# Patient Record
Sex: FEMALE | Race: WHITE | NOT HISPANIC OR LATINO | Employment: PART TIME | URBAN - METROPOLITAN AREA
[De-identification: names, ages, dates, MRNs, and addresses within clinical notes are randomized per-mention and may not be internally consistent; named-entity substitution may affect disease eponyms.]

---

## 2017-10-13 ENCOUNTER — HOSPITAL ENCOUNTER (OUTPATIENT)
Dept: MAMMOGRAPHY | Facility: CLINIC | Age: 67
Discharge: HOME | End: 2017-10-13
Attending: FAMILY MEDICINE | Admitting: FAMILY MEDICINE
Payer: MEDICARE

## 2017-10-13 DIAGNOSIS — Z12.31 ENCOUNTER FOR SCREENING MAMMOGRAM FOR HIGH-RISK PATIENT: ICD-10-CM

## 2017-10-13 PROCEDURE — 77063 BREAST TOMOSYNTHESIS BI: CPT

## 2018-01-12 DIAGNOSIS — R06.02 SOB (SHORTNESS OF BREATH): Primary | ICD-10-CM

## 2018-01-12 DIAGNOSIS — E78.00 HIGH CHOLESTEROL: ICD-10-CM

## 2018-01-12 DIAGNOSIS — Z82.49 FAMILY HISTORY OF CORONARY ARTERY DISEASE: ICD-10-CM

## 2018-01-12 DIAGNOSIS — I10 HTN (HYPERTENSION): ICD-10-CM

## 2018-01-26 ENCOUNTER — HOSPITAL ENCOUNTER (OUTPATIENT)
Dept: CARDIOLOGY | Facility: CLINIC | Age: 68
Discharge: HOME | End: 2018-01-26
Attending: FAMILY MEDICINE | Admitting: FAMILY MEDICINE
Payer: MEDICARE

## 2018-01-26 DIAGNOSIS — Z82.49 FAMILY HISTORY OF CORONARY ARTERY DISEASE: ICD-10-CM

## 2018-01-26 DIAGNOSIS — I10 HTN (HYPERTENSION): ICD-10-CM

## 2018-01-26 DIAGNOSIS — R06.02 SOB (SHORTNESS OF BREATH): ICD-10-CM

## 2018-01-26 DIAGNOSIS — E78.00 HIGH CHOLESTEROL: ICD-10-CM

## 2018-01-26 PROCEDURE — 93325 DOPPLER ECHO COLOR FLOW MAPG: CPT | Mod: 26 | Performed by: INTERNAL MEDICINE

## 2018-01-26 PROCEDURE — 93350 STRESS TTE ONLY: CPT | Mod: 26 | Performed by: INTERNAL MEDICINE

## 2018-01-26 PROCEDURE — 93321 DOPPLER ECHO F-UP/LMTD STD: CPT | Mod: 26 | Performed by: INTERNAL MEDICINE

## 2018-01-26 PROCEDURE — 93018 CV STRESS TEST I&R ONLY: CPT | Performed by: INTERNAL MEDICINE

## 2018-01-26 PROCEDURE — 93016 CV STRESS TEST SUPVJ ONLY: CPT | Performed by: INTERNAL MEDICINE

## 2018-01-26 PROCEDURE — 25500064 ZZH RX 255 OP 636: Performed by: FAMILY MEDICINE

## 2018-01-26 PROCEDURE — 93017 CV STRESS TEST TRACING ONLY: CPT

## 2018-01-26 RX ADMIN — HUMAN ALBUMIN MICROSPHERES AND PERFLUTREN 6 ML: 10; .22 INJECTION, SOLUTION INTRAVENOUS at 10:30

## 2018-11-02 ENCOUNTER — HOSPITAL ENCOUNTER (OUTPATIENT)
Dept: MAMMOGRAPHY | Facility: CLINIC | Age: 68
Discharge: HOME | End: 2018-11-02
Attending: FAMILY MEDICINE | Admitting: FAMILY MEDICINE
Payer: MEDICARE

## 2018-11-02 DIAGNOSIS — Z12.31 VISIT FOR SCREENING MAMMOGRAM: ICD-10-CM

## 2018-11-02 PROCEDURE — 77063 BREAST TOMOSYNTHESIS BI: CPT

## 2020-01-17 ENCOUNTER — MEDICAL CORRESPONDENCE (OUTPATIENT)
Dept: HEALTH INFORMATION MANAGEMENT | Facility: CLINIC | Age: 70
End: 2020-01-17

## 2020-01-17 ENCOUNTER — HOSPITAL ENCOUNTER (INPATIENT)
Facility: CLINIC | Age: 70
Setting detail: SURGERY ADMIT
End: 2020-01-17
Attending: ORTHOPAEDIC SURGERY | Admitting: ORTHOPAEDIC SURGERY
Payer: COMMERCIAL

## 2020-01-17 DIAGNOSIS — Z00.00 ROUTINE GENERAL MEDICAL EXAMINATION AT A HEALTH CARE FACILITY: Primary | ICD-10-CM

## 2020-01-29 ENCOUNTER — HOSPITAL ENCOUNTER (OUTPATIENT)
Dept: LAB | Facility: CLINIC | Age: 70
Discharge: HOME | End: 2020-01-29
Attending: ORTHOPAEDIC SURGERY | Admitting: ORTHOPAEDIC SURGERY
Payer: COMMERCIAL

## 2020-01-29 DIAGNOSIS — Z00.00 ROUTINE GENERAL MEDICAL EXAMINATION AT A HEALTH CARE FACILITY: ICD-10-CM

## 2020-01-29 LAB — HGB BLD-MCNC: 12.9 G/DL (ref 11.7–15.7)

## 2020-01-29 PROCEDURE — 85018 HEMOGLOBIN: CPT | Performed by: ORTHOPAEDIC SURGERY

## 2020-01-29 PROCEDURE — 36415 COLL VENOUS BLD VENIPUNCTURE: CPT | Performed by: ORTHOPAEDIC SURGERY

## 2020-03-15 RX ORDER — ACETAMINOPHEN 500 MG
1000 TABLET ORAL ONCE
Status: CANCELLED | OUTPATIENT
Start: 2020-03-15 | End: 2020-03-15

## 2020-03-15 RX ORDER — CEFAZOLIN SODIUM 2 G/100ML
2 INJECTION, SOLUTION INTRAVENOUS
Status: CANCELLED | OUTPATIENT
Start: 2020-03-15

## 2020-03-15 RX ORDER — CELECOXIB 200 MG/1
400 CAPSULE ORAL ONCE
Status: CANCELLED | OUTPATIENT
Start: 2020-03-15 | End: 2020-03-15

## 2020-03-15 RX ORDER — CEFAZOLIN SODIUM 1 G/3ML
1 INJECTION, POWDER, FOR SOLUTION INTRAMUSCULAR; INTRAVENOUS SEE ADMIN INSTRUCTIONS
Status: CANCELLED | OUTPATIENT
Start: 2020-03-15

## 2020-03-15 RX ORDER — PREGABALIN 150 MG/1
150 CAPSULE ORAL ONCE
Status: CANCELLED | OUTPATIENT
Start: 2020-03-15 | End: 2020-03-15

## 2020-05-11 DIAGNOSIS — Z11.59 ENCOUNTER FOR SCREENING FOR OTHER VIRAL DISEASES: Primary | ICD-10-CM

## 2020-05-24 DIAGNOSIS — Z11.59 ENCOUNTER FOR SCREENING FOR OTHER VIRAL DISEASES: ICD-10-CM

## 2020-05-24 PROCEDURE — 99000 SPECIMEN HANDLING OFFICE-LAB: CPT | Performed by: ORTHOPAEDIC SURGERY

## 2020-05-24 PROCEDURE — 87635 SARS-COV-2 COVID-19 AMP PRB: CPT | Performed by: ORTHOPAEDIC SURGERY

## 2020-05-24 PROCEDURE — 99207 ZZC NO BILLABLE SERVICE THIS VISIT: CPT

## 2020-05-25 LAB
SARS-COV-2 RNA SPEC QL NAA+PROBE: NOT DETECTED
SPECIMEN SOURCE: NORMAL

## 2020-05-26 RX ORDER — ASPIRIN 81 MG/1
81 TABLET ORAL DAILY
Status: ON HOLD | COMMUNITY
End: 2020-05-28

## 2020-05-26 RX ORDER — SERTRALINE HYDROCHLORIDE 100 MG/1
100 TABLET, FILM COATED ORAL DAILY
COMMUNITY

## 2020-05-26 RX ORDER — HYDROCHLOROTHIAZIDE 25 MG/1
25 TABLET ORAL DAILY
COMMUNITY
End: 2021-07-20

## 2020-05-26 RX ORDER — LOSARTAN POTASSIUM 100 MG/1
100 TABLET ORAL DAILY
COMMUNITY
End: 2021-07-20

## 2020-05-26 RX ORDER — ROSUVASTATIN CALCIUM 10 MG/1
20 TABLET, COATED ORAL DAILY
COMMUNITY
End: 2024-05-09

## 2020-05-26 NOTE — PROGRESS NOTES
PTA medications updated by Medication Scribe day before surgery via phone call with patient      -LAST DOSES ENTERED BY NURSE-    Medication history sources: Patient, Surescripts and H&P  Medication history source reliability: Good  Adherence assessment: N/A Not Observed    Significant changes made to the medication list:  Patient taking meds differently than prescribed; See PTA entries for: Rosuvastatin (takes every other day due to muscle pains)      Additional medication history information:   None        Prior to Admission medications    Medication Sig Last Dose Taking? Auth Provider   aspirin 81 MG EC tablet Take 81 mg by mouth daily  at AM Yes Reported, Patient   Coenzyme Q10 (COQ-10 PO) Take 1 tablet by mouth every other day  Yes Reported, Patient   Glucosamine HCl (GLUCOSAMINE PO) Take 1 tablet by mouth daily as needed  at PRN Yes Reported, Patient   hydrochlorothiazide (HYDRODIURIL) 25 MG tablet Take 25 mg by mouth daily  at AM Yes Reported, Patient   losartan (COZAAR) 100 MG tablet Take 100 mg by mouth daily  at AM Yes Reported, Patient   rosuvastatin (CRESTOR) 10 MG tablet Take 10 mg by mouth every other day (Patient taking differently than prescribed : RX states take 10mg daily)  Yes Reported, Patient   sertraline (ZOLOFT) 100 MG tablet Take 50 mg by mouth daily (takes 0.5 x 100mg)  at AM Yes Reported, Patient

## 2020-05-27 ENCOUNTER — ANESTHESIA (OUTPATIENT)
Dept: SURGERY | Facility: CLINIC | Age: 70
DRG: 470 | End: 2020-05-27
Payer: COMMERCIAL

## 2020-05-27 ENCOUNTER — APPOINTMENT (OUTPATIENT)
Dept: GENERAL RADIOLOGY | Facility: CLINIC | Age: 70
DRG: 470 | End: 2020-05-27
Attending: ORTHOPAEDIC SURGERY
Payer: COMMERCIAL

## 2020-05-27 ENCOUNTER — HOSPITAL ENCOUNTER (INPATIENT)
Facility: CLINIC | Age: 70
LOS: 1 days | Discharge: HOME | DRG: 470 | End: 2020-05-28
Attending: ORTHOPAEDIC SURGERY | Admitting: ORTHOPAEDIC SURGERY
Payer: COMMERCIAL

## 2020-05-27 ENCOUNTER — ANESTHESIA EVENT (OUTPATIENT)
Dept: SURGERY | Facility: CLINIC | Age: 70
DRG: 470 | End: 2020-05-27
Payer: COMMERCIAL

## 2020-05-27 ENCOUNTER — APPOINTMENT (OUTPATIENT)
Dept: PHYSICAL THERAPY | Facility: CLINIC | Age: 70
DRG: 470 | End: 2020-05-27
Payer: COMMERCIAL

## 2020-05-27 DIAGNOSIS — Z96.641 STATUS POST RIGHT HIP REPLACEMENT: Primary | ICD-10-CM

## 2020-05-27 LAB
ABO + RH BLD: NORMAL
ABO + RH BLD: NORMAL
BLD GP AB SCN SERPL QL: NORMAL
BLOOD BANK CMNT PATIENT-IMP: NORMAL
CREAT SERPL-MCNC: 0.65 MG/DL (ref 0.52–1.04)
GFR SERPL CREATININE-BSD FRML MDRD: >90 ML/MIN/{1.73_M2}
HGB BLD-MCNC: 13.8 G/DL (ref 11.7–15.7)
POTASSIUM SERPL-SCNC: 3.7 MMOL/L (ref 3.4–5.3)
SPECIMEN EXP DATE BLD: NORMAL

## 2020-05-27 PROCEDURE — 99207 ZZC CONSULT E&M CHANGED TO INITIAL LEVEL: CPT | Performed by: NURSE PRACTITIONER

## 2020-05-27 PROCEDURE — 25000132 ZZH RX MED GY IP 250 OP 250 PS 637: Performed by: ORTHOPAEDIC SURGERY

## 2020-05-27 PROCEDURE — 25000125 ZZHC RX 250: Performed by: PHYSICIAN ASSISTANT

## 2020-05-27 PROCEDURE — 25000128 H RX IP 250 OP 636: Performed by: NURSE ANESTHETIST, CERTIFIED REGISTERED

## 2020-05-27 PROCEDURE — 86901 BLOOD TYPING SEROLOGIC RH(D): CPT | Performed by: ANESTHESIOLOGY

## 2020-05-27 PROCEDURE — 85018 HEMOGLOBIN: CPT | Performed by: PHYSICIAN ASSISTANT

## 2020-05-27 PROCEDURE — 40000170 ZZH STATISTIC PRE-PROCEDURE ASSESSMENT II: Performed by: ORTHOPAEDIC SURGERY

## 2020-05-27 PROCEDURE — 25000125 ZZHC RX 250: Performed by: NURSE ANESTHETIST, CERTIFIED REGISTERED

## 2020-05-27 PROCEDURE — 25800025 ZZH RX 258: Performed by: ORTHOPAEDIC SURGERY

## 2020-05-27 PROCEDURE — 36415 COLL VENOUS BLD VENIPUNCTURE: CPT | Performed by: PHYSICIAN ASSISTANT

## 2020-05-27 PROCEDURE — 25000128 H RX IP 250 OP 636: Performed by: ORTHOPAEDIC SURGERY

## 2020-05-27 PROCEDURE — 25000128 H RX IP 250 OP 636: Performed by: ANESTHESIOLOGY

## 2020-05-27 PROCEDURE — 71000013 ZZH RECOVERY PHASE 1 LEVEL 1 EA ADDTL HR: Performed by: ORTHOPAEDIC SURGERY

## 2020-05-27 PROCEDURE — 97161 PT EVAL LOW COMPLEX 20 MIN: CPT | Mod: GP | Performed by: PHYSICAL THERAPIST

## 2020-05-27 PROCEDURE — 84132 ASSAY OF SERUM POTASSIUM: CPT | Performed by: PHYSICIAN ASSISTANT

## 2020-05-27 PROCEDURE — 25800030 ZZH RX IP 258 OP 636: Performed by: ORTHOPAEDIC SURGERY

## 2020-05-27 PROCEDURE — 71000012 ZZH RECOVERY PHASE 1 LEVEL 1 FIRST HR: Performed by: ORTHOPAEDIC SURGERY

## 2020-05-27 PROCEDURE — 25000128 H RX IP 250 OP 636: Performed by: PHYSICIAN ASSISTANT

## 2020-05-27 PROCEDURE — 25800030 ZZH RX IP 258 OP 636: Performed by: NURSE ANESTHETIST, CERTIFIED REGISTERED

## 2020-05-27 PROCEDURE — 0SR904A REPLACEMENT OF RIGHT HIP JOINT WITH CERAMIC ON POLYETHYLENE SYNTHETIC SUBSTITUTE, UNCEMENTED, OPEN APPROACH: ICD-10-PCS | Performed by: ORTHOPAEDIC SURGERY

## 2020-05-27 PROCEDURE — 25000132 ZZH RX MED GY IP 250 OP 250 PS 637: Performed by: PHYSICIAN ASSISTANT

## 2020-05-27 PROCEDURE — 82565 ASSAY OF CREATININE: CPT | Performed by: PHYSICIAN ASSISTANT

## 2020-05-27 PROCEDURE — 36000065 ZZH SURGERY LEVEL 4 W FLUORO 1ST 30 MIN: Performed by: ORTHOPAEDIC SURGERY

## 2020-05-27 PROCEDURE — C1713 ANCHOR/SCREW BN/BN,TIS/BN: HCPCS | Performed by: ORTHOPAEDIC SURGERY

## 2020-05-27 PROCEDURE — 99221 1ST HOSP IP/OBS SF/LOW 40: CPT | Performed by: NURSE PRACTITIONER

## 2020-05-27 PROCEDURE — 25800030 ZZH RX IP 258 OP 636: Performed by: ANESTHESIOLOGY

## 2020-05-27 PROCEDURE — 40000985 XR PELVIS AD HIP PORTABLE RIGHT 1 VIEW

## 2020-05-27 PROCEDURE — 86850 RBC ANTIBODY SCREEN: CPT | Performed by: ANESTHESIOLOGY

## 2020-05-27 PROCEDURE — 25000566 ZZH SEVOFLURANE, EA 15 MIN: Performed by: ORTHOPAEDIC SURGERY

## 2020-05-27 PROCEDURE — 97116 GAIT TRAINING THERAPY: CPT | Mod: GP | Performed by: PHYSICAL THERAPIST

## 2020-05-27 PROCEDURE — 97110 THERAPEUTIC EXERCISES: CPT | Mod: GP | Performed by: PHYSICAL THERAPIST

## 2020-05-27 PROCEDURE — 97530 THERAPEUTIC ACTIVITIES: CPT | Mod: GP | Performed by: PHYSICAL THERAPIST

## 2020-05-27 PROCEDURE — 37000009 ZZH ANESTHESIA TECHNICAL FEE, EACH ADDTL 15 MIN: Performed by: ORTHOPAEDIC SURGERY

## 2020-05-27 PROCEDURE — 86900 BLOOD TYPING SEROLOGIC ABO: CPT | Performed by: ANESTHESIOLOGY

## 2020-05-27 PROCEDURE — 27210794 ZZH OR GENERAL SUPPLY STERILE: Performed by: ORTHOPAEDIC SURGERY

## 2020-05-27 PROCEDURE — 37000008 ZZH ANESTHESIA TECHNICAL FEE, 1ST 30 MIN: Performed by: ORTHOPAEDIC SURGERY

## 2020-05-27 PROCEDURE — C1776 JOINT DEVICE (IMPLANTABLE): HCPCS | Performed by: ORTHOPAEDIC SURGERY

## 2020-05-27 PROCEDURE — 12000000 ZZH R&B MED SURG/OB

## 2020-05-27 PROCEDURE — 36000063 ZZH SURGERY LEVEL 4 EA 15 ADDTL MIN: Performed by: ORTHOPAEDIC SURGERY

## 2020-05-27 PROCEDURE — 40000277 XR SURGERY CARM FLUORO LESS THAN 5 MIN W STILLS

## 2020-05-27 PROCEDURE — 25000125 ZZHC RX 250: Performed by: ORTHOPAEDIC SURGERY

## 2020-05-27 DEVICE — APEX HOLE ELIMINATOR - PS
Type: IMPLANTABLE DEVICE | Site: HIP | Status: FUNCTIONAL
Brand: APEX

## 2020-05-27 DEVICE — ACTIS DUOFIX HIP PROSTHESIS (FEMORAL STEM 12/14 TAPER CEMENTLESS SIZE 3 STD COLLAR)  CE
Type: IMPLANTABLE DEVICE | Site: HIP | Status: FUNCTIONAL
Brand: ACTIS

## 2020-05-27 DEVICE — PINNACLE CANCELLOUS BONE SCREW 6.5MM X 25MM
Type: IMPLANTABLE DEVICE | Site: HIP | Status: FUNCTIONAL
Brand: PINNACLE

## 2020-05-27 DEVICE — PINNACLE CANCELLOUS BONE SCREW 6.5MM X 35MM
Type: IMPLANTABLE DEVICE | Site: HIP | Status: FUNCTIONAL
Brand: PINNACLE

## 2020-05-27 DEVICE — PINNACLE GRIPTION ACETABULAR SHELL SECTOR 52MM OD
Type: IMPLANTABLE DEVICE | Site: HIP | Status: FUNCTIONAL
Brand: PINNACLE GRIPTION

## 2020-05-27 DEVICE — BIOLOX DELTA CERAMIC FEMORAL HEAD +8.5 36MM DIA 12/14 TAPER
Type: IMPLANTABLE DEVICE | Site: HIP | Status: FUNCTIONAL
Brand: BIOLOX DELTA

## 2020-05-27 DEVICE — PINNACLE HIP SOLUTIONS ALTRX POLYETHYLENE ACETABULAR LINER NEUTRAL 36MM ID 52MM OD
Type: IMPLANTABLE DEVICE | Site: HIP | Status: FUNCTIONAL
Brand: PINNACLE ALTRX

## 2020-05-27 RX ORDER — CEFAZOLIN SODIUM 1 G/3ML
1 INJECTION, POWDER, FOR SOLUTION INTRAMUSCULAR; INTRAVENOUS SEE ADMIN INSTRUCTIONS
Status: DISCONTINUED | OUTPATIENT
Start: 2020-05-27 | End: 2020-05-27 | Stop reason: HOSPADM

## 2020-05-27 RX ORDER — LIDOCAINE 40 MG/G
CREAM TOPICAL
Status: DISCONTINUED | OUTPATIENT
Start: 2020-05-27 | End: 2020-05-28 | Stop reason: HOSPADM

## 2020-05-27 RX ORDER — DEXTROSE MONOHYDRATE, SODIUM CHLORIDE, AND POTASSIUM CHLORIDE 50; 1.49; 4.5 G/1000ML; G/1000ML; G/1000ML
INJECTION, SOLUTION INTRAVENOUS CONTINUOUS
Status: DISCONTINUED | OUTPATIENT
Start: 2020-05-27 | End: 2020-05-28 | Stop reason: HOSPADM

## 2020-05-27 RX ORDER — ALBUTEROL SULFATE 0.83 MG/ML
2.5 SOLUTION RESPIRATORY (INHALATION) EVERY 4 HOURS PRN
Status: DISCONTINUED | OUTPATIENT
Start: 2020-05-27 | End: 2020-05-27 | Stop reason: HOSPADM

## 2020-05-27 RX ORDER — HYDROXYZINE HYDROCHLORIDE 25 MG/1
25 TABLET, FILM COATED ORAL EVERY 6 HOURS PRN
Status: DISCONTINUED | OUTPATIENT
Start: 2020-05-27 | End: 2020-05-28 | Stop reason: HOSPADM

## 2020-05-27 RX ORDER — CELECOXIB 200 MG/1
400 CAPSULE ORAL ONCE
Status: COMPLETED | OUTPATIENT
Start: 2020-05-27 | End: 2020-05-27

## 2020-05-27 RX ORDER — NALOXONE HYDROCHLORIDE 0.4 MG/ML
.1-.4 INJECTION, SOLUTION INTRAMUSCULAR; INTRAVENOUS; SUBCUTANEOUS
Status: CANCELLED | OUTPATIENT
Start: 2020-05-27 | End: 2020-05-28

## 2020-05-27 RX ORDER — ONDANSETRON 2 MG/ML
4 INJECTION INTRAMUSCULAR; INTRAVENOUS EVERY 6 HOURS PRN
Status: DISCONTINUED | OUTPATIENT
Start: 2020-05-27 | End: 2020-05-28 | Stop reason: HOSPADM

## 2020-05-27 RX ORDER — MAGNESIUM HYDROXIDE 1200 MG/15ML
LIQUID ORAL PRN
Status: DISCONTINUED | OUTPATIENT
Start: 2020-05-27 | End: 2020-05-27 | Stop reason: HOSPADM

## 2020-05-27 RX ORDER — ONDANSETRON 2 MG/ML
INJECTION INTRAMUSCULAR; INTRAVENOUS PRN
Status: DISCONTINUED | OUTPATIENT
Start: 2020-05-27 | End: 2020-05-27

## 2020-05-27 RX ORDER — CEFAZOLIN SODIUM 2 G/100ML
2 INJECTION, SOLUTION INTRAVENOUS
Status: COMPLETED | OUTPATIENT
Start: 2020-05-27 | End: 2020-05-27

## 2020-05-27 RX ORDER — ONDANSETRON 4 MG/1
4 TABLET, ORALLY DISINTEGRATING ORAL EVERY 30 MIN PRN
Status: DISCONTINUED | OUTPATIENT
Start: 2020-05-27 | End: 2020-05-27 | Stop reason: HOSPADM

## 2020-05-27 RX ORDER — PROPOFOL 10 MG/ML
INJECTION, EMULSION INTRAVENOUS CONTINUOUS PRN
Status: DISCONTINUED | OUTPATIENT
Start: 2020-05-27 | End: 2020-05-27

## 2020-05-27 RX ORDER — CEFAZOLIN SODIUM 2 G/100ML
2 INJECTION, SOLUTION INTRAVENOUS EVERY 8 HOURS
Status: COMPLETED | OUTPATIENT
Start: 2020-05-27 | End: 2020-05-28

## 2020-05-27 RX ORDER — LIDOCAINE HYDROCHLORIDE 20 MG/ML
INJECTION, SOLUTION INFILTRATION; PERINEURAL PRN
Status: DISCONTINUED | OUTPATIENT
Start: 2020-05-27 | End: 2020-05-27

## 2020-05-27 RX ORDER — FENTANYL CITRATE 50 UG/ML
25-50 INJECTION, SOLUTION INTRAMUSCULAR; INTRAVENOUS EVERY 5 MIN PRN
Status: DISCONTINUED | OUTPATIENT
Start: 2020-05-27 | End: 2020-05-27 | Stop reason: HOSPADM

## 2020-05-27 RX ORDER — IBUPROFEN 400 MG/1
400 TABLET, FILM COATED ORAL 2 TIMES DAILY PRN
Status: ON HOLD | COMMUNITY
End: 2020-05-28

## 2020-05-27 RX ORDER — HYDROMORPHONE HYDROCHLORIDE 1 MG/ML
0.2 INJECTION, SOLUTION INTRAMUSCULAR; INTRAVENOUS; SUBCUTANEOUS
Status: DISCONTINUED | OUTPATIENT
Start: 2020-05-27 | End: 2020-05-28 | Stop reason: HOSPADM

## 2020-05-27 RX ORDER — HYDROMORPHONE HYDROCHLORIDE 1 MG/ML
.3-.5 INJECTION, SOLUTION INTRAMUSCULAR; INTRAVENOUS; SUBCUTANEOUS EVERY 5 MIN PRN
Status: DISCONTINUED | OUTPATIENT
Start: 2020-05-27 | End: 2020-05-27 | Stop reason: HOSPADM

## 2020-05-27 RX ORDER — DEXAMETHASONE SODIUM PHOSPHATE 4 MG/ML
INJECTION, SOLUTION INTRA-ARTICULAR; INTRALESIONAL; INTRAMUSCULAR; INTRAVENOUS; SOFT TISSUE PRN
Status: DISCONTINUED | OUTPATIENT
Start: 2020-05-27 | End: 2020-05-27

## 2020-05-27 RX ORDER — ACETAMINOPHEN 500 MG
1000 TABLET ORAL ONCE
Status: COMPLETED | OUTPATIENT
Start: 2020-05-27 | End: 2020-05-27

## 2020-05-27 RX ORDER — TRANEXAMIC ACID 10 MG/ML
1 INJECTION, SOLUTION INTRAVENOUS ONCE
Status: COMPLETED | OUTPATIENT
Start: 2020-05-27 | End: 2020-05-27

## 2020-05-27 RX ORDER — ONDANSETRON 2 MG/ML
4 INJECTION INTRAMUSCULAR; INTRAVENOUS EVERY 30 MIN PRN
Status: DISCONTINUED | OUTPATIENT
Start: 2020-05-27 | End: 2020-05-27 | Stop reason: HOSPADM

## 2020-05-27 RX ORDER — NALOXONE HYDROCHLORIDE 0.4 MG/ML
.1-.4 INJECTION, SOLUTION INTRAMUSCULAR; INTRAVENOUS; SUBCUTANEOUS
Status: DISCONTINUED | OUTPATIENT
Start: 2020-05-27 | End: 2020-05-28 | Stop reason: HOSPADM

## 2020-05-27 RX ORDER — FENTANYL CITRATE 50 UG/ML
INJECTION, SOLUTION INTRAMUSCULAR; INTRAVENOUS PRN
Status: DISCONTINUED | OUTPATIENT
Start: 2020-05-27 | End: 2020-05-27

## 2020-05-27 RX ORDER — SODIUM CHLORIDE, SODIUM LACTATE, POTASSIUM CHLORIDE, CALCIUM CHLORIDE 600; 310; 30; 20 MG/100ML; MG/100ML; MG/100ML; MG/100ML
INJECTION, SOLUTION INTRAVENOUS CONTINUOUS
Status: DISCONTINUED | OUTPATIENT
Start: 2020-05-27 | End: 2020-05-27 | Stop reason: HOSPADM

## 2020-05-27 RX ORDER — PROPOFOL 10 MG/ML
INJECTION, EMULSION INTRAVENOUS PRN
Status: DISCONTINUED | OUTPATIENT
Start: 2020-05-27 | End: 2020-05-27

## 2020-05-27 RX ORDER — EPHEDRINE SULFATE 50 MG/ML
INJECTION, SOLUTION INTRAMUSCULAR; INTRAVENOUS; SUBCUTANEOUS PRN
Status: DISCONTINUED | OUTPATIENT
Start: 2020-05-27 | End: 2020-05-27

## 2020-05-27 RX ORDER — LOSARTAN POTASSIUM 100 MG/1
100 TABLET ORAL DAILY
Status: DISCONTINUED | OUTPATIENT
Start: 2020-05-28 | End: 2020-05-28 | Stop reason: HOSPADM

## 2020-05-27 RX ORDER — POLYETHYLENE GLYCOL 3350 17 G/17G
17 POWDER, FOR SOLUTION ORAL DAILY
Status: DISCONTINUED | OUTPATIENT
Start: 2020-05-27 | End: 2020-05-28 | Stop reason: HOSPADM

## 2020-05-27 RX ORDER — PREGABALIN 150 MG/1
150 CAPSULE ORAL ONCE
Status: COMPLETED | OUTPATIENT
Start: 2020-05-27 | End: 2020-05-27

## 2020-05-27 RX ORDER — KETOROLAC TROMETHAMINE 30 MG/ML
INJECTION, SOLUTION INTRAMUSCULAR; INTRAVENOUS PRN
Status: DISCONTINUED | OUTPATIENT
Start: 2020-05-27 | End: 2020-05-27 | Stop reason: HOSPADM

## 2020-05-27 RX ORDER — KETOROLAC TROMETHAMINE 15 MG/ML
15 INJECTION, SOLUTION INTRAMUSCULAR; INTRAVENOUS EVERY 6 HOURS
Status: DISCONTINUED | OUTPATIENT
Start: 2020-05-27 | End: 2020-05-28 | Stop reason: HOSPADM

## 2020-05-27 RX ORDER — VANCOMYCIN HYDROCHLORIDE 1 G/20ML
INJECTION, POWDER, LYOPHILIZED, FOR SOLUTION INTRAVENOUS PRN
Status: DISCONTINUED | OUTPATIENT
Start: 2020-05-27 | End: 2020-05-27 | Stop reason: HOSPADM

## 2020-05-27 RX ORDER — ROSUVASTATIN CALCIUM 10 MG/1
10 TABLET, COATED ORAL AT BEDTIME
Status: DISCONTINUED | OUTPATIENT
Start: 2020-05-27 | End: 2020-05-28 | Stop reason: HOSPADM

## 2020-05-27 RX ORDER — ACETAMINOPHEN 325 MG/1
325-650 TABLET ORAL 2 TIMES DAILY PRN
Status: ON HOLD | COMMUNITY
End: 2020-05-28

## 2020-05-27 RX ORDER — BISACODYL 10 MG
10 SUPPOSITORY, RECTAL RECTAL DAILY PRN
Status: DISCONTINUED | OUTPATIENT
Start: 2020-05-27 | End: 2020-05-28 | Stop reason: HOSPADM

## 2020-05-27 RX ORDER — ACETAMINOPHEN 325 MG/1
650 TABLET ORAL EVERY 4 HOURS PRN
Status: DISCONTINUED | OUTPATIENT
Start: 2020-05-30 | End: 2020-05-28 | Stop reason: HOSPADM

## 2020-05-27 RX ORDER — ACETAMINOPHEN 325 MG/1
975 TABLET ORAL EVERY 8 HOURS
Status: DISCONTINUED | OUTPATIENT
Start: 2020-05-27 | End: 2020-05-28 | Stop reason: HOSPADM

## 2020-05-27 RX ORDER — MEPERIDINE HYDROCHLORIDE 25 MG/ML
12.5 INJECTION INTRAMUSCULAR; INTRAVENOUS; SUBCUTANEOUS EVERY 5 MIN PRN
Status: DISCONTINUED | OUTPATIENT
Start: 2020-05-27 | End: 2020-05-27 | Stop reason: HOSPADM

## 2020-05-27 RX ORDER — OXYCODONE HYDROCHLORIDE 5 MG/1
5-10 TABLET ORAL
Status: DISCONTINUED | OUTPATIENT
Start: 2020-05-27 | End: 2020-05-28 | Stop reason: HOSPADM

## 2020-05-27 RX ORDER — ONDANSETRON 4 MG/1
4 TABLET, ORALLY DISINTEGRATING ORAL EVERY 6 HOURS PRN
Status: DISCONTINUED | OUTPATIENT
Start: 2020-05-27 | End: 2020-05-28 | Stop reason: HOSPADM

## 2020-05-27 RX ORDER — AMOXICILLIN 250 MG
2 CAPSULE ORAL 2 TIMES DAILY
Status: DISCONTINUED | OUTPATIENT
Start: 2020-05-27 | End: 2020-05-28 | Stop reason: HOSPADM

## 2020-05-27 RX ADMIN — TRANEXAMIC ACID 1 G: 10 INJECTION, SOLUTION INTRAVENOUS at 12:41

## 2020-05-27 RX ADMIN — Medication 5 MG: at 11:19

## 2020-05-27 RX ADMIN — CEFAZOLIN SODIUM 2 G: 2 INJECTION, SOLUTION INTRAVENOUS at 20:47

## 2020-05-27 RX ADMIN — FENTANYL CITRATE 50 MCG: 50 INJECTION, SOLUTION INTRAMUSCULAR; INTRAVENOUS at 11:46

## 2020-05-27 RX ADMIN — SODIUM CHLORIDE, POTASSIUM CHLORIDE, SODIUM LACTATE AND CALCIUM CHLORIDE: 600; 310; 30; 20 INJECTION, SOLUTION INTRAVENOUS at 09:21

## 2020-05-27 RX ADMIN — FENTANYL CITRATE 50 MCG: 50 INJECTION, SOLUTION INTRAMUSCULAR; INTRAVENOUS at 13:35

## 2020-05-27 RX ADMIN — SUGAMMADEX 200 MG: 100 INJECTION, SOLUTION INTRAVENOUS at 12:51

## 2020-05-27 RX ADMIN — POTASSIUM CHLORIDE, DEXTROSE MONOHYDRATE AND SODIUM CHLORIDE: 150; 5; 450 INJECTION, SOLUTION INTRAVENOUS at 17:28

## 2020-05-27 RX ADMIN — ROCURONIUM BROMIDE 50 MG: 10 INJECTION INTRAVENOUS at 10:58

## 2020-05-27 RX ADMIN — CEFAZOLIN SODIUM 2 G: 2 INJECTION, SOLUTION INTRAVENOUS at 11:05

## 2020-05-27 RX ADMIN — Medication 5 MG: at 11:35

## 2020-05-27 RX ADMIN — DEXAMETHASONE SODIUM PHOSPHATE 4 MG: 4 INJECTION, SOLUTION INTRA-ARTICULAR; INTRALESIONAL; INTRAMUSCULAR; INTRAVENOUS; SOFT TISSUE at 10:58

## 2020-05-27 RX ADMIN — Medication 5 MG: at 11:53

## 2020-05-27 RX ADMIN — KETOROLAC TROMETHAMINE 15 MG: 15 INJECTION, SOLUTION INTRAMUSCULAR; INTRAVENOUS at 21:05

## 2020-05-27 RX ADMIN — PROPOFOL 20 MG: 10 INJECTION, EMULSION INTRAVENOUS at 13:08

## 2020-05-27 RX ADMIN — HYDROMORPHONE HYDROCHLORIDE 0.5 MG: 1 INJECTION, SOLUTION INTRAMUSCULAR; INTRAVENOUS; SUBCUTANEOUS at 13:34

## 2020-05-27 RX ADMIN — PHENYLEPHRINE HYDROCHLORIDE 100 MCG: 10 INJECTION INTRAVENOUS at 12:32

## 2020-05-27 RX ADMIN — PROPOFOL 170 MG: 10 INJECTION, EMULSION INTRAVENOUS at 10:58

## 2020-05-27 RX ADMIN — PROPOFOL 30 MG: 10 INJECTION, EMULSION INTRAVENOUS at 11:07

## 2020-05-27 RX ADMIN — SODIUM CHLORIDE, POTASSIUM CHLORIDE, SODIUM LACTATE AND CALCIUM CHLORIDE: 600; 310; 30; 20 INJECTION, SOLUTION INTRAVENOUS at 13:06

## 2020-05-27 RX ADMIN — HYDROMORPHONE HYDROCHLORIDE 0.5 MG: 1 INJECTION, SOLUTION INTRAMUSCULAR; INTRAVENOUS; SUBCUTANEOUS at 14:15

## 2020-05-27 RX ADMIN — PREGABALIN 150 MG: 150 CAPSULE ORAL at 09:50

## 2020-05-27 RX ADMIN — ROCURONIUM BROMIDE 20 MG: 10 INJECTION INTRAVENOUS at 11:46

## 2020-05-27 RX ADMIN — SODIUM CHLORIDE, POTASSIUM CHLORIDE, SODIUM LACTATE AND CALCIUM CHLORIDE: 600; 310; 30; 20 INJECTION, SOLUTION INTRAVENOUS at 11:48

## 2020-05-27 RX ADMIN — FENTANYL CITRATE 50 MCG: 50 INJECTION, SOLUTION INTRAMUSCULAR; INTRAVENOUS at 13:46

## 2020-05-27 RX ADMIN — CELECOXIB 400 MG: 200 CAPSULE ORAL at 09:50

## 2020-05-27 RX ADMIN — ONDANSETRON 4 MG: 2 INJECTION INTRAMUSCULAR; INTRAVENOUS at 12:00

## 2020-05-27 RX ADMIN — FENTANYL CITRATE 50 MCG: 50 INJECTION, SOLUTION INTRAMUSCULAR; INTRAVENOUS at 11:07

## 2020-05-27 RX ADMIN — OXYCODONE HYDROCHLORIDE 5 MG: 5 TABLET ORAL at 23:09

## 2020-05-27 RX ADMIN — FENTANYL CITRATE 50 MCG: 50 INJECTION, SOLUTION INTRAMUSCULAR; INTRAVENOUS at 10:58

## 2020-05-27 RX ADMIN — Medication 10 MG: at 11:13

## 2020-05-27 RX ADMIN — ACETAMINOPHEN 975 MG: 325 TABLET, FILM COATED ORAL at 17:29

## 2020-05-27 RX ADMIN — LIDOCAINE HYDROCHLORIDE 100 MG: 20 INJECTION, SOLUTION INFILTRATION; PERINEURAL at 10:58

## 2020-05-27 RX ADMIN — PROPOFOL 20 MCG/KG/MIN: 10 INJECTION, EMULSION INTRAVENOUS at 11:34

## 2020-05-27 RX ADMIN — CEFAZOLIN SODIUM 1 G: 2 INJECTION, SOLUTION INTRAVENOUS at 13:05

## 2020-05-27 RX ADMIN — Medication 5 MG: at 11:26

## 2020-05-27 RX ADMIN — ACETAMINOPHEN 1000 MG: 500 TABLET, FILM COATED ORAL at 09:53

## 2020-05-27 RX ADMIN — DOCUSATE SODIUM 50 MG AND SENNOSIDES 8.6 MG 2 TABLET: 8.6; 5 TABLET, FILM COATED ORAL at 20:45

## 2020-05-27 RX ADMIN — TRANEXAMIC ACID 1 G: 10 INJECTION, SOLUTION INTRAVENOUS at 11:10

## 2020-05-27 RX ADMIN — Medication 5 MG: at 12:11

## 2020-05-27 RX ADMIN — HYDROMORPHONE HYDROCHLORIDE 0.5 MG: 1 INJECTION, SOLUTION INTRAMUSCULAR; INTRAVENOUS; SUBCUTANEOUS at 13:03

## 2020-05-27 RX ADMIN — HYDROXYZINE HYDROCHLORIDE 25 MG: 25 TABLET ORAL at 21:04

## 2020-05-27 RX ADMIN — FENTANYL CITRATE 50 MCG: 50 INJECTION, SOLUTION INTRAMUSCULAR; INTRAVENOUS at 11:41

## 2020-05-27 ASSESSMENT — ACTIVITIES OF DAILY LIVING (ADL)
ADLS_ACUITY_SCORE: 13
ADLS_ACUITY_SCORE: 14

## 2020-05-27 ASSESSMENT — MIFFLIN-ST. JEOR: SCORE: 1320.81

## 2020-05-27 NOTE — PLAN OF CARE
A&OX4.  Up with 1 and walker with PT.  IV fluids infusing.  Patient is due to void, straight cath'd in PACU at 1430 for 250mL.  Patient states her pain is tolerable, scheduled tylenol given and ice pack utilized.

## 2020-05-27 NOTE — ANESTHESIA CARE TRANSFER NOTE
Patient: Inés Griggs    Procedure(s):  RIGHT TOTAL HIP ARTHROPLASTY DIRECT ANTERIOR APPROACH    Diagnosis: Primary osteoarthritis of right hip [M16.11]  Diagnosis Additional Information: No value filed.    Anesthesia Type:   General     Note:  Airway :Face Mask  Patient transferred to:PACU  Comments: To PACU: Arouses easily, good airway, 02 face mask, VSS  Report to RNHandoff Report: Identifed the Patient, Identified the Reponsible Provider, Reviewed the pertinent medical history, Discussed the surgical course, Reviewed Intra-OP anesthesia mangement and issues during anesthesia, Set expectations for post-procedure period and Allowed opportunity for questions and acknowledgement of understanding      Vitals: (Last set prior to Anesthesia Care Transfer)    CRNA VITALS  5/27/2020 1251 - 5/27/2020 1330      5/27/2020             Pulse:  91    SpO2:  100 %                Electronically Signed By: PORTILLO Kerr CRNA  May 27, 2020  1:30 PM

## 2020-05-27 NOTE — PROGRESS NOTES
05/27/20 1602   Quick Adds   Type of Visit Initial PT Evaluation   Living Environment   Lives With alone   Living Arrangements house   Home Accessibility stairs to enter home;stairs within home   Number of Stairs, Main Entrance 2   Stair Railings, Main Entrance none   Number of Stairs, Within Home, Primary   (13)   Stair Railings, Within Home, Primary railing on left side (ascending)   Transportation Anticipated car, drives self   Living Environment Comment Pt's daughter will be staying with pt at time of discharge.    Self-Care   Usual Activity Tolerance good   Current Activity Tolerance moderate   Regular Exercise No   Equipment Currently Used at Home none   Activity/Exercise/Self-Care Comment Pt will have access to a 4WW. Pt has walking poles.   Functional Level Prior   Ambulation 0-->independent   Transferring 0-->independent   Fall history within last six months no   General Information   Onset of Illness/Injury or Date of Surgery - Date 05/27/20   Referring Physician Usama Christianson MD   Patient/Family Goals Statement Return home with assist of daughter.    Pertinent History of Current Problem (include personal factors and/or comorbidities that impact the POC) 71 y/o female POD # 0 R BOBBY.    Precautions/Limitations fall precautions   Weight-Bearing Status - RLE weight-bearing as tolerated   General Observations Pt in supine upon arrival of therapist.    General Info Comments Ambulate with assist.   Cognitive Status Examination   Orientation orientation to person, place and time   Level of Consciousness alert   Follows Commands and Answers Questions 100% of the time   Personal Safety and Judgment intact   Pain Assessment   Patient Currently in Pain   (R hip pain at rest: 2-3/10)   Integumentary/Edema   Integumentary/Edema Comments R hip incision covered with dressing.    Posture    Posture Comments Noted forward head and shoulder posture upon sitting EOB and standing at FWW.    Range of Motion (ROM)   ROM  "Comment Limited R hip ROM secondary to pain, otherwise B LEs WFL.    Strength   Strength Comments Not formally assessed. Pt demonstrates at least 3/5 grossly in B LEs.   Bed Mobility   Bed Mobility Comments Supine-sit with SBA.    Transfer Skills   Transfer Comments Sit <> stand wtih FWW and Fred.    Gait   Gait Comments Pt amb 10' with FWW and Fred.    Balance   Balance Comments Noted good seated balance and fair standing balance at FWW.   Sensory Examination   Sensory Perception Comments Pt denies numbness/tingling in B LEs.    Modality Interventions   Planned Modality Interventions Cryotherapy   Planned Modality Interventions Comments PRN.   General Therapy Interventions   Planned Therapy Interventions bed mobility training;gait training;ROM;strengthening;transfer training   Clinical Impression   Criteria for Skilled Therapeutic Intervention yes, treatment indicated   PT Diagnosis Difficulty with gait.    Influenced by the following impairments Pain, Impaired R hip ROM, Decreased strength, Decreased activity tolerance   Functional limitations due to impairments Limited functional mobility requiring AD and assist.    Clinical Presentation Stable/Uncomplicated   Clinical Presentation Rationale Based on PMH, current presentation, and social support.    Clinical Decision Making (Complexity) Low complexity   Therapy Frequency 2x/day   Predicted Duration of Therapy Intervention (days/wks) 2 days   Anticipated Equipment Needs at Discharge walker   Anticipated Discharge Disposition Other (see comments)  (Defer to Ortho MD/PA)   Risk & Benefits of therapy have been explained Yes   Patient, Family & other staff in agreement with plan of care Yes   [facility] [employer]-PAC TM \"6 Clicks\"   2016, Trustees of [facility], under license to [employer].  All rights reserved.   6 Clicks Short Forms Basic Mobility Inpatient Short Form   [facility] AM-PAC  \"6 Clicks\" V.2 Basic Mobility Inpatient Short Form   1. " Turning from your back to your side while in a flat bed without using bedrails? 3 - A Little   2. Moving from lying on your back to sitting on the side of a flat bed without using bedrails? 3 - A Little   3. Moving to and from a bed to a chair (including a wheelchair)? 3 - A Little   4. Standing up from a chair using your arms (e.g., wheelchair, or bedside chair)? 3 - A Little   5. To walk in hospital room? 3 - A Little   6. Climbing 3-5 steps with a railing? 2 - A Lot   Basic Mobility Raw Score (Score out of 24.Lower scores equate to lower levels of function) 17   Total Evaluation Time   Total Evaluation Time (Minutes) 5

## 2020-05-27 NOTE — CONSULTS
"Consult Date:  2020      REQUESTING PHYSICIAN:  Usama Mckinley MD      REASON FOR CONSULTATION:  \"Hospitalist consult.\"      HISTORY OF PRESENT ILLNESS:  Ms. Griggs is a 70-year-old woman with PMH of hypertension, hyperlipidemia, anxiety, prediabetes, who on 2020 with Dr. Mckinley underwent a right total hip arthroplasty, direct anterior approach.  Duration of procedure was 2 hours and 15 minutes.  EBL was 300 mL and intake was 2.2 liters of LR.  Other medications received included Decadron, Ancef, tranexamic acid.  She was extubated at the end of her procedure and admitted to station 55 for further evaluation and management.  Hospitalist Service was asked to see her in consultation to assist with management of her medical comorbid conditions.      The patient receives primary care through [facility].  She denies any acute changes in her health since her preoperative visit.  She had a COVID test on 2020 which was not detected.  She periodically checks blood pressures at home, systolics run anywhere from 120s to 160s.  Her last dose of HCTZ was on the am of 2020.  Her last dose of losartan was on the a.m. of 2020.  During my visit, her blood pressure was 116/64.      PAST MEDICAL HISTORY:   1.  Hypertension.   2.  Anxiety.   3.  Hyperlipidemia.   4.  Prediabetes.      PAST SURGICAL HISTORY:   1.  .   2.  Parotidectomy.      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.  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  She is single, , remote former smoker, quitting 40 years ago.  Nondrug user.      FAMILY HISTORY:  Two adult children who are alive and well.  Father  at age 76 possibly of MI.  His first MI was at age 68.  Mother with thoracic aortic aneurysm.      OUTPATIENT MEDICATIONS:    Medications Prior to Admission   Medication Sig Dispense Refill Last Dose     acetaminophen (TYLENOL) 325 " MG tablet Take 325-650 mg by mouth 2 times daily as needed for mild pain   5/26/2020 at 0800     aspirin 81 MG EC tablet Take 81 mg by mouth daily   Past Week at AM     Coenzyme Q10 (COQ-10 PO) Take 1 tablet by mouth every other day   5/24/2020 at 0800     Glucosamine HCl (GLUCOSAMINE PO) Take 1 tablet by mouth daily as needed   Past Week at PRN     hydrochlorothiazide (HYDRODIURIL) 25 MG tablet Take 25 mg by mouth daily   5/26/2020 at 0800     ibuprofen (ADVIL/MOTRIN) 400 MG tablet Take 400 mg by mouth 2 times daily as needed for moderate pain   5/20/2020 at 0800     losartan (COZAAR) 100 MG tablet Take 100 mg by mouth daily   5/27/2020 at 0630     rosuvastatin (CRESTOR) 10 MG tablet Take 10 mg by mouth every other day (Patient taking differently than prescribed : RX states take 10mg daily)   5/25/2020 at 0800     sertraline (ZOLOFT) 100 MG tablet Take 50 mg by mouth daily (takes 0.5 x 100mg)   5/26/2020 at 0800        REVIEW OF SYSTEMS:  Ten-point review of systems negative aside from what is described in HPI.      PHYSICAL EXAMINATION:   GENERAL:  Middle-aged woman, appears stated age without acute distress.   Neuro: +follows commands wiggle toes and show 2 fingers bilat, face symmetric, speech fluent  HEENT: NC/AT, pupils equal round 3mm briskly reactive bilat, sclera nonicteric, noninjected, conjunctiva pale, mouth moist oral mucosa  Neck: supple  Heart: S1S2, no murmurs  Lungs: CTAB upper and lower lobes  Abd:normoactive bowel sounds, soft, nontender, nondisteded   EXTREMITIES:  Right hip with large dressing clean, dry and intact, no shadowing, soft surrounding tissue.  No hematoma appreciated. no edema, preserved sensation and motor function, brisk CR, easily palpable DP and PT pulses      IMPRESSION:  Ms. Griggs is a 70-year-old woman with past medical history of hypertension, hyperlipidemia, anxiety, prediabetes, who underwent a right total hip arthroplasty, direct anterior approach on 05/27/2020.   Hospitalist Service has been asked to see her in consultation to assist with management of medical comorbid conditions.      PROBLEM LIST AND PLAN:   Hypertension.   -- Resume losartan on the a.m. of 2020.   -- Can consider resuming HCTZ on the a.m. of 2020 pending her blood pressure readings and laboratory data.     Hyperlipidemia on low dose every other day to 2x/week dosing statin 2/2 myalgias, last dose was 20.   -- Resume Crestor on the a.m. of 2020.     Anxiety, well controlled on Zoloft.   -- Notably, there is an interaction between her Zoloft and scheduled ketorolac that is ordered.   -- After discussion with the patient,  she is okay,  holding her Zoloft while she is concurrently on Toradol.     Renal.  Normal renal function with baseline creatinine of 0.65.  Currently on D5 half NS with 20 of KCl.  Also on ARB and diuretic prior to admission.   -- We will check a BMP in the a.m. of 2020.     Status post right total hip arthroplasty on 2020, direct anterior approach with Dr. Mckinley.   -- Defer analgesia, mobility, therapy, antimicrobials, pharmacologic DVT prophylaxis to primary surgical service.     GI.   -- Agree with scheduled bowel regimen.     Prophylaxis.   -- Aspirin 325 mg daily x 6 weeks, to start on 2020.     DISPOSITION:  anticipate discharge on postop day one pending how she does with therapies.     CODE STATUS:  Nothing is ordered.  I discussed this with the patient, she desires full resuscitative measures.        Total time is 25 minutes, of which 15 minutes was face-to-face time, remainder spent in counseling and coordination of care.      The patient will be independently evaluated by Dr. Lanre Wheatley.         As dictated by INA IRIZARRY, PORTILLO, CNP            D: 2020   T: 2020   MT: LORI      Name:     NARESH LIM   MRN:      [id number]        Account:       [id number]   :      1950           Consult Date:  2020       Document: [id number]       cc: Usama Christianson MD

## 2020-05-27 NOTE — ANESTHESIA POSTPROCEDURE EVALUATION
Patient: Inés Griggs    Procedure(s):  RIGHT TOTAL HIP ARTHROPLASTY DIRECT ANTERIOR APPROACH    Diagnosis:Primary osteoarthritis of right hip [M16.11]  Diagnosis Additional Information: No value filed.    Anesthesia Type:  General    Note:  Anesthesia Post Evaluation    Patient location during evaluation: PACU  Patient participation: Able to fully participate in evaluation  Level of consciousness: awake and alert  Pain management: adequate  Airway patency: patent  Cardiovascular status: acceptable and hemodynamically stable  Respiratory status: acceptable and nasal cannula  Hydration status: euvolemic  PONV: none     Anesthetic complications: None          Last vitals:  Vitals:    05/27/20 1530 05/27/20 1600 05/27/20 1630   BP: 109/58 116/61 130/68   Pulse: 90 75 78   Resp: 9 15 16   Temp:      SpO2: 95% 95% 95%         Electronically Signed By: Antonio Arnett MD  May 27, 2020  4:48 PM

## 2020-05-27 NOTE — BRIEF OP NOTE
[facility]    Brief Operative Note    Pre-operative diagnosis: Right hip OA  Post-operative diagnosis same  Procedure: RIGHT DIRECT ANTERIOR TOTAL HIP ARTHROPLASTY  Surgeon(s) and Role:     * Usama Christianson MD - Primary     * Efren Castillo PA-C - Assisting  Anesthesia: General   Estimated blood loss: 300 ml  Drains:  None  Specimens: None  Findings:  Advanced OA  Complications: None    Plan: DC home POD1 w/family assist.  DVT prophylaxis w/ASA 325mg QD x6wks.      Implants:   Implant Name Type Inv. Item Serial No.  Lot No. LRB No. Used Action   IMP APEX HOLE ELIMINATOR HIP DEPUY DURALOC 1246- Metallic Hardware/[place] IMP APEX HOLE ELIMINATOR HIP DEPUY DURALOC 1246-  [employer] [id number] Right 1 Implanted   IMP SHELL ACET DEPUY PINNACLE GRIPTION 52MM [id number] Total Joint Component/Insert IMP SHELL ACET DEPUY PINNACLE GRIPTION 52MM [id number]  [id number] Right 1 Implanted   IMP SCR BONE CAN ACE 6.5X35MM 1217- Metallic Hardware/[place] IMP SCR BONE CAN ACE 6.5X35MM 1217-  [employer] [id number] Right 1 Implanted   IMP SCR BONE CAN ACE 6.5X25MM 1217- Metallic Hardware/[place] IMP SCR BONE CAN ACE 6.5X25MM 1217-  [employer] [id number] Right 1 Implanted   IMP INSERT HIP DEPUY PINNACLE ALTRX [id number] 1221- Total Joint Component/Insert IMP INSERT HIP DEPUY PINNACLE ALTRX [id number] 1221-  [employer]-   [id number] Right 1 Implanted   ACTIS SIZE 3 FEMORAL STEM    Depuy [id number] Right 1 Implanted   IMP HEAD FEMORAL DEPUY CERAMIC 36MM +8MM 1365- Total Joint Component/Insert IMP HEAD FEMORAL DEPUY CERAMIC 36MM +8MM 1365-  [id number] Right 1 Implanted

## 2020-05-27 NOTE — ANESTHESIA PREPROCEDURE EVALUATION
"Anesthesia Pre-Procedure Evaluation    Patient: Inés Griggs   MRN: [id number] : 1950          Preoperative Diagnosis: Primary osteoarthritis of right hip [M16.11]    Procedure(s):  RIGHT TOTAL HIP ARTHROPLASTY DIRECT ANTERIOR APPROACH    Past Medical History:   Diagnosis Date     Arthritis      Hypertension      History reviewed. No pertinent surgical history.    Anesthesia Evaluation     .             ROS/MED HX    ENT/Pulmonary:  - neg pulmonary ROS    (-) sleep apnea   Neurologic:  - neg neurologic ROS     Cardiovascular:     (+) hypertension----. : . . . :. .       METS/Exercise Tolerance:     Hematologic:         Musculoskeletal:   (+) arthritis,  -       GI/Hepatic:  - neg GI/hepatic ROS      (-) GERD   Renal/Genitourinary:  - ROS Renal section negative       Endo:  - neg endo ROS       Psychiatric:     (+) psychiatric history anxiety      Infectious Disease:         Malignancy:         Other:                          Physical Exam  Normal systems: dental    Airway   Mallampati: II  TM distance: >3 FB  Neck ROM: full    Dental     Cardiovascular   Rhythm and rate: regular      Pulmonary    breath sounds clear to auscultation            Lab Results   Component Value Date    HGB 13.8 2020    POTASSIUM 3.7 2020    CR 0.65 2020       Preop Vitals  BP Readings from Last 3 Encounters:   20 (!) 163/72    Pulse Readings from Last 3 Encounters:   No data found for Pulse      Resp Readings from Last 3 Encounters:   20 16    SpO2 Readings from Last 3 Encounters:   20 98%      Temp Readings from Last 1 Encounters:   20 36.4  C (97.5  F) (Temporal)    Ht Readings from Last 1 Encounters:   20 1.638 m (5' 4.5\")      Wt Readings from Last 1 Encounters:   20 80.3 kg (177 lb)    Estimated body mass index is 29.91 kg/m  as calculated from the following:    Height as of this encounter: 1.638 m (5' 4.5\").    Weight as of this encounter: 80.3 kg (177 lb). "       Anesthesia Plan      History & Physical Review  History and physical reviewed and following examination; no interval change.    ASA Status:  2 .    NPO Status:  > 8 hours    Plan for General (ETT) with Intravenous and Propofol induction. Maintenance will be Balanced.    PONV prophylaxis:  Ondansetron (or other 5HT-3) and Dexamethasone or Solumedrol         Postoperative Care  Postoperative pain management:  Multi-modal analgesia.      Consents  Anesthetic plan, risks, benefits and alternatives discussed with:  Patient..                 Raffaele Morrow MD

## 2020-05-27 NOTE — PLAN OF CARE
Patient plan: Return home. Pt reports her daughter plans to stay with her for 2 days.  Current status: Evaluation completed and treatment initiated. Patient is POD # 0 R BOBBY. Pt completed bed mobility with SBA. Transfers and ambulation of 200 feet with FWW and Fred d/t occasional unsteadiness.   Anticipated status at discharge: CGA for transfers and gait with use of walker, Fred to navigate stairs

## 2020-05-28 ENCOUNTER — APPOINTMENT (OUTPATIENT)
Dept: OCCUPATIONAL THERAPY | Facility: CLINIC | Age: 70
DRG: 470 | End: 2020-05-28
Attending: ORTHOPAEDIC SURGERY
Payer: COMMERCIAL

## 2020-05-28 ENCOUNTER — DOCUMENTATION ONLY (OUTPATIENT)
Dept: OTHER | Facility: CLINIC | Age: 70
End: 2020-05-28

## 2020-05-28 ENCOUNTER — APPOINTMENT (OUTPATIENT)
Dept: PHYSICAL THERAPY | Facility: CLINIC | Age: 70
DRG: 470 | End: 2020-05-28
Attending: ORTHOPAEDIC SURGERY
Payer: COMMERCIAL

## 2020-05-28 VITALS
BODY MASS INDEX: 29.49 KG/M2 | HEIGHT: 65 IN | TEMPERATURE: 98.1 F | HEART RATE: 71 BPM | OXYGEN SATURATION: 92 % | RESPIRATION RATE: 16 BRPM | WEIGHT: 177 LBS | DIASTOLIC BLOOD PRESSURE: 59 MMHG | SYSTOLIC BLOOD PRESSURE: 120 MMHG

## 2020-05-28 LAB
ANION GAP SERPL CALCULATED.3IONS-SCNC: 6 MMOL/L (ref 3–14)
BUN SERPL-MCNC: 15 MG/DL (ref 7–30)
CALCIUM SERPL-MCNC: 8.3 MG/DL (ref 8.5–10.1)
CHLORIDE SERPL-SCNC: 111 MMOL/L (ref 94–109)
CO2 SERPL-SCNC: 23 MMOL/L (ref 20–32)
CREAT SERPL-MCNC: 0.64 MG/DL (ref 0.52–1.04)
GFR SERPL CREATININE-BSD FRML MDRD: >90 ML/MIN/{1.73_M2}
GLUCOSE SERPL-MCNC: 112 MG/DL (ref 70–99)
HGB BLD-MCNC: 11.2 G/DL (ref 11.7–15.7)
POTASSIUM SERPL-SCNC: 3.5 MMOL/L (ref 3.4–5.3)
SODIUM SERPL-SCNC: 140 MMOL/L (ref 133–144)

## 2020-05-28 PROCEDURE — 99232 SBSQ HOSP IP/OBS MODERATE 35: CPT | Performed by: INTERNAL MEDICINE

## 2020-05-28 PROCEDURE — 85018 HEMOGLOBIN: CPT | Performed by: ORTHOPAEDIC SURGERY

## 2020-05-28 PROCEDURE — 25000132 ZZH RX MED GY IP 250 OP 250 PS 637: Performed by: ORTHOPAEDIC SURGERY

## 2020-05-28 PROCEDURE — 97535 SELF CARE MNGMENT TRAINING: CPT | Mod: GO | Performed by: OCCUPATIONAL THERAPIST

## 2020-05-28 PROCEDURE — 25000128 H RX IP 250 OP 636: Performed by: ORTHOPAEDIC SURGERY

## 2020-05-28 PROCEDURE — 97530 THERAPEUTIC ACTIVITIES: CPT | Mod: GP | Performed by: PHYSICAL THERAPIST

## 2020-05-28 PROCEDURE — 97116 GAIT TRAINING THERAPY: CPT | Mod: GP | Performed by: PHYSICAL THERAPIST

## 2020-05-28 PROCEDURE — 80048 BASIC METABOLIC PNL TOTAL CA: CPT | Performed by: ORTHOPAEDIC SURGERY

## 2020-05-28 PROCEDURE — 36415 COLL VENOUS BLD VENIPUNCTURE: CPT | Performed by: ORTHOPAEDIC SURGERY

## 2020-05-28 PROCEDURE — 25000132 ZZH RX MED GY IP 250 OP 250 PS 637: Performed by: NURSE PRACTITIONER

## 2020-05-28 PROCEDURE — 97110 THERAPEUTIC EXERCISES: CPT | Mod: GP | Performed by: PHYSICAL THERAPIST

## 2020-05-28 PROCEDURE — 97165 OT EVAL LOW COMPLEX 30 MIN: CPT | Mod: GO | Performed by: OCCUPATIONAL THERAPIST

## 2020-05-28 RX ORDER — CELECOXIB 200 MG/1
200 CAPSULE ORAL DAILY
Qty: 30 CAPSULE | Refills: 0 | Status: SHIPPED | OUTPATIENT
Start: 2020-05-28 | End: 2021-07-20

## 2020-05-28 RX ORDER — SENNOSIDES A AND B 8.6 MG/1
1-2 TABLET, FILM COATED ORAL DAILY
Qty: 14 TABLET | Refills: 0 | Status: SHIPPED | OUTPATIENT
Start: 2020-05-28 | End: 2021-07-20

## 2020-05-28 RX ORDER — ASPIRIN 325 MG
325 TABLET ORAL DAILY
Qty: 42 TABLET | Refills: 0 | Status: SHIPPED | OUTPATIENT
Start: 2020-05-28 | End: 2020-07-09

## 2020-05-28 RX ORDER — OXYCODONE HYDROCHLORIDE 5 MG/1
5-10 TABLET ORAL EVERY 6 HOURS PRN
Qty: 30 TABLET | Refills: 0 | Status: SHIPPED | OUTPATIENT
Start: 2020-05-28 | End: 2020-05-31

## 2020-05-28 RX ORDER — ACETAMINOPHEN 500 MG
500-1000 TABLET ORAL EVERY 6 HOURS PRN
Qty: 60 TABLET | Refills: 0 | Status: ON HOLD | OUTPATIENT
Start: 2020-05-28 | End: 2021-07-22

## 2020-05-28 RX ADMIN — LOSARTAN POTASSIUM 100 MG: 100 TABLET, FILM COATED ORAL at 09:02

## 2020-05-28 RX ADMIN — ASPIRIN 325 MG: 325 TABLET, COATED ORAL at 09:02

## 2020-05-28 RX ADMIN — ACETAMINOPHEN 975 MG: 325 TABLET, FILM COATED ORAL at 09:01

## 2020-05-28 RX ADMIN — DOCUSATE SODIUM 50 MG AND SENNOSIDES 8.6 MG 2 TABLET: 8.6; 5 TABLET, FILM COATED ORAL at 09:01

## 2020-05-28 RX ADMIN — OXYCODONE HYDROCHLORIDE 5 MG: 5 TABLET ORAL at 12:29

## 2020-05-28 RX ADMIN — OXYCODONE HYDROCHLORIDE 5 MG: 5 TABLET ORAL at 06:50

## 2020-05-28 RX ADMIN — KETOROLAC TROMETHAMINE 15 MG: 15 INJECTION, SOLUTION INTRAMUSCULAR; INTRAVENOUS at 03:12

## 2020-05-28 RX ADMIN — KETOROLAC TROMETHAMINE 15 MG: 15 INJECTION, SOLUTION INTRAMUSCULAR; INTRAVENOUS at 09:05

## 2020-05-28 RX ADMIN — CEFAZOLIN SODIUM 2 G: 2 INJECTION, SOLUTION INTRAVENOUS at 05:55

## 2020-05-28 RX ADMIN — ACETAMINOPHEN 975 MG: 325 TABLET, FILM COATED ORAL at 03:11

## 2020-05-28 RX ADMIN — HYDROXYZINE HYDROCHLORIDE 25 MG: 25 TABLET ORAL at 03:21

## 2020-05-28 ASSESSMENT — ACTIVITIES OF DAILY LIVING (ADL)
ADLS_ACUITY_SCORE: 11
PREVIOUS_RESPONSIBILITIES: MEAL PREP;LAUNDRY;HOUSEKEEPING;SHOPPING;MEDICATION MANAGEMENT;FINANCES;DRIVING
ADLS_ACUITY_SCORE: 11

## 2020-05-28 NOTE — PROGRESS NOTES
"Orthopedic Surgery  5/28/2020  POD #1    S: Patient voices no complaints today. Pain controlled. Ambulating.     O: Blood pressure 131/74, pulse 74, temperature 98.1  F (36.7  C), temperature source Oral, resp. rate 17, height 1.638 m (5' 4.5\"), weight 80.3 kg (177 lb), SpO2 97 %.  Lab Results   Component Value Date    HGB 11.2 05/28/2020     Neurovascularly intact.  Calves are negative bilaterally, both soft and nontender.  The dressing is C/D/I.    A: Ms. Griggs is doing well status post right total hip arthroplasty.    P:   1. No dressing change, patient to remove outer dressing on POD3, leaving mesh adhesive in place.  2. Mobilize and continue physical therapy.   3. Discharge to home today.    Efren Castillo PA-C    As above    ruano  "

## 2020-05-28 NOTE — PLAN OF CARE
Pt A&Ox4. VSS on RA. CMS intact. Dressing C/D/I. Voiding adequately. Up with SBA with walker and gaitbelt. Pain managed with scheduled tylenol and PRN oxycodone x1. Capno monitoring in place. PCDs on. IV SL. Intake adequate. Will continue to monitor.

## 2020-05-28 NOTE — PLAN OF CARE
Patient plan: to go home and have daughter stay   Current status: Pt is Shashi with bed mobility with bed flat and no bedrail. Pt ambulates 300ft with FWW step through pattern. Pt ascended and descended 3 steps with one rail and SBA and cues. Pt uses FWW SBA for transfers. Pt completes BOBBY ex actively with cues and has handout. Will try 4WW to determine equipment needs.  Anticipated status at discharge: Shashi to Fred for bed mobility, SBA for transfers and gait with either FWW or 4WW. Supervision to SBA for stairs.

## 2020-05-28 NOTE — PROGRESS NOTES
05/28/20 0946   Quick Adds   Type of Visit Initial Occupational Therapy Evaluation   Living Environment   Lives With alone   Living Arrangements house   Home Accessibility stairs to enter home;stairs within home   Number of Stairs, Main Entrance 2   Number of Stairs, Within Home, Primary   (12)   Living Environment Comment pt usually mostly stays on main level. laundry in basement. has tub shower on main level. having son install a grab bar by the toilet. has shower chair for tub and can hang on to sink stepping in and out   Self-Care   Usual Activity Tolerance good   Current Activity Tolerance moderate   Regular Exercise No   Activity/Exercise/Self-Care Comment has RTS, shower chair, grab bars   Functional Level   Fall history within last six months no   General Information   Onset of Illness/Injury or Date of Surgery - Date 05/27/20   Referring Physician Usama Kim MD   Patient/Family Goals Statement planning to discharge today   Additional Occupational Profile Info/Pertinent History of Current Problem s/p R BOBBY   Precautions/Limitations no known precautions/limitations   General Observations pt sitting up in chair, agreeable to OT eval   Cognitive Status Examination   Orientation orientation to person, place and time   Level of Consciousness alert   Follows Commands (Cognition) WNL   Memory intact   Attention No deficits were identified   Organization/Problem Solving No deficits were identified   Executive Function No deficits were identified   Visual Perception   Visual Perception No deficits were identified;Wears glasses   Sensory Examination   Sensory Quick Adds No deficits were identified   Sensory Comments denies numbness and tingling   Pain Assessment   Patient Currently in Pain No   Integumentary/Edema   Integumentary/Edema no deficits were identifed   Range of Motion (ROM)   ROM Quick Adds No deficits were identified   Strength   Manual Muscle Testing Quick Adds No deficits were identified   Muscle  Tone Assessment   Muscle Tone Quick Adds No deficits were identified   Coordination   Upper Extremity Coordination No deficits were identified   Mobility   Bed Mobility Comments SBA   Transfer Skill: Bed to Chair/Chair to Bed   Level of [place]: Bed to Chair stand-by assist   Transfer Skill: Sit to Stand   Level of [place]: Sit/Stand stand-by assist   Physical Assist/Nonphysical Assist: Sit/Stand supervision   Assistive Device for Transfer: Sit/Stand rolling walker   Transfer Skill: Toilet Transfer   Level of [place]: Toilet stand-by assist   Physical Assist/Nonphysical Assist: Toilet supervision   Assistive Device rolling walker   Lower Body Dressing   Level of [place]: Dress Lower Body moderate assist (50% patients effort)   Toileting   Level of [place]: Toilet contact guard   Grooming   Level of [place]: Grooming stand-by assist   Physical Assist/Nonphysical Assist: Grooming supervision   Eating/Self Feeding   Level of [place]: Eating independent   Instrumental Activities of Daily Living (IADL)   Previous Responsibilities meal prep;laundry;housekeeping;shopping;medication management;finances;driving   Activities of Daily Living Analysis   Impairments Contributing to Impaired Activities of Daily Living flexibility decreased;pain   General Therapy Interventions   Planned Therapy Interventions ADL retraining;transfer training   Clinical Impression   Criteria for Skilled Therapeutic Interventions Met yes, treatment indicated   OT Diagnosis decreased I with ADL's and functional mobility   Influenced by the following impairments decreased flexibility   Assessment of Occupational Performance 1-3 Performance Deficits   Identified Performance Deficits decreased dressing, BR transfers   Clinical Decision Making (Complexity) Low complexity   Therapy Frequency Daily   Predicted Duration of Therapy Intervention (days/wks) 1 day   Anticipated Discharge Disposition Home with Assist   Risks and  "Benefits of Treatment have been explained. Yes   Patient, Family & other staff in agreement with plan of care Yes   [facility] TM \"6 Clicks\"   2016, Trustees of [facility], under license to [employer].  All rights reserved.   6 Clicks Short Forms Daily Activity Inpatient Short Form   [facility] AM-PAC  \"6 Clicks\" Daily Activity Inpatient Short Form   1. Putting on and taking off regular lower body clothing? 3 - A Little   2. Bathing (including washing, rinsing, drying)? 3 - A Little   3. Toileting, which includes using toilet, bedpan or urinal? 4 - None   4. Putting on and taking off regular upper body clothing? 4 - None   5. Taking care of personal grooming such as brushing teeth? 4 - None   6. Eating meals? 4 - None   Daily Activity Raw Score (Score out of 24.Lower scores equate to lower levels of function) 22   Total Evaluation Time   Total Evaluation Time (Minutes) 10     "

## 2020-05-28 NOTE — PLAN OF CARE
Discharge Planner OT   Patient plan for discharge: home with daughter staying as long as needed  Current status: OT eval completed and treatment initiated. Pt lives alone in 1.5 story house. Usually remains on the main level, but has laundry in the basement. Daughter can assist with all IADL's as needed. Pt was able to demo, after education, mod I with toilet transfer, grooming at sink, and LE dressing. SBA with tub transfer. Daughter to assist with SBA for shower transfer.   Barriers to return to prior living situation: none anticipated  Recommendations for discharge: home with assist of daughter for cooking, laundry, cleaning and driving.   Rationale for recommendations: OT goals met, discharge from OT       Entered by: Caty Mcpherson 05/28/2020 10:06 AM     Occupational Therapy Discharge Summary    Reason for therapy discharge:    OT goals met.     Progress towards therapy goal(s). See goals on Care Plan in [facility] electronic health record for goal details.  Goals met    Therapy recommendation(s):    No further therapy is recommended.

## 2020-05-28 NOTE — PROGRESS NOTES
[facility]    Medicine Progress Note - Hospitalist Service       Date of Admission:  5/27/2020  Assessment & Plan   Inés Griggs is a 70 year old F with past medical history of hypertension, hyperlipidemia, anxiety, prediabetes, who underwent a right total hip arthroplasty, direct anterior approach on 05/27/2020.  Hospitalist Service has been asked to see her in consultation to assist with management of medical comorbid conditions.      Osteoarthritis status post right total hip arthroplasty   Performed on 05/27/20 by Dr. Usama Christianson by direct anterior approach.  - Routine post-operative cares per orthopedic surgery service   - Plan for aspirin prophylaxis per orthopedic surgery service     Hypertension  - Blood pressure well-controlled  - Continue prior to admission losartan, hydrochlorothiazide on discharge  - Renal function stable      Hyperlipidemia  On low dose every other day to 2x/week dosing statin 2/2 myalgias, last dose was 5/25/20.   - Continue prior to admission rosuvastatin on discharge      Anxiety  - Resume prior to admission sertraline on discharge    Diet: Advance Diet as Tolerated: Regular Diet Adult  Diet    DVT Prophylaxis: Aspirin   Hinds Catheter: not present  Code Status: Full Code      Disposition Plan   Expected discharge: Anticipated discharge today per orthopedic surgery service   Entered: Lanre Wheatley MD 05/28/2020, 8:37 AM       The patient's care was discussed with the Patient.    Lanre Wheatley MD  Hospitalist Service  [facility]    ______________________________________________________________________    Interval History   No acute events overnight. Denies any chest pain or shortness of breath. Did well with therapy today. Anticipating discharge later today.     Data reviewed today: I reviewed all medications, new labs and imaging results over the last 24 hours. I personally reviewed no images or EKG's today.    Physical Exam   Vital  Signs: Temp: 97.6  F (36.4  C) Temp src: Oral BP: 111/60 Pulse: 69 Heart Rate: 73 Resp: 16 SpO2: 94 % O2 Device: Nasal cannula Oxygen Delivery: 1 LPM  Weight: 177 lbs 0 oz    Constitutional: Well-appearing, NAD  Respiratory: Clear to auscultation bilaterally, good air movement bilaterally  Cardiovascular: RRR, no m/r/g.   GI:   Skin/Integumen:    Neuro: Alert. Responding to questions appropriately. Following commands.      Data   Recent Labs   Lab 05/28/20  0654 05/27/20  0920   HGB 11.2* 13.8     --    POTASSIUM 3.5 3.7   CHLORIDE 111*  --    CO2 23  --    BUN 15  --    CR 0.64 0.65   ANIONGAP 6  --    NADJA 8.3*  --    *  --        Recent Results (from the past 24 hour(s))   XR Surgery LEATHA Fluoro L/T 5 Min w Stills    Narrative    SURGERY C-ARM FLUOROSCOPY LESS THAN FIVE MINUTES WITH STILLS   5/27/2020 12:50 PM     HISTORY: Right total hip arthroplasty.    COMPARISON: None.      Impression    IMPRESSION: There are a total of 6 spot fluoroscopic images with total  hip arthroplasty. No periprosthetic fracture. Total fluoroscopic time  is 0.6 minutes.    DANIELA GAO MD   XR Pelvis w Hip Port Right 1 View    Narrative    PELVIS AND RIGHT HIP, PORTABLE ONE VIEW   5/27/2020 1:49 PM     HISTORY: Status post anterior total hip arthroplasty.    COMPARISON: Intraoperative images from today.      Impression    IMPRESSION: Right total hip arthroplasty in place. No evidence of  complication.    ELVIA GUPTA MD     Medications     dextrose 5% and 0.45% NaCl + KCl 20 mEq/L 100 mL/hr at 05/27/20 1728       acetaminophen  975 mg Oral Q8H     aspirin  325 mg Oral Daily     ketorolac  15 mg Intravenous Q6H     losartan  100 mg Oral Daily     polyethylene glycol  17 g Oral Daily     rosuvastatin  10 mg Oral At Bedtime     senna-docusate  2 tablet Oral BID     sodium chloride (PF)  3 mL Intracatheter Q8H

## 2020-05-28 NOTE — PLAN OF CARE
A&OX4.  Up with 1 and walker.  Voiding adequately.  Tolerating regular diet.  Discharge packet and medications reviewed/sent home with the patient.  Patient will discharge home at 1500.

## 2020-05-28 NOTE — PLAN OF CARE
Physical Therapy Discharge Summary    Reason for therapy discharge:    Discharged to home.    Progress towards therapy goal(s). See goals on Care Plan in [facility] electronic health record for goal details.  Goals met    Therapy recommendation(s):    Continue home exercise program.

## 2020-05-29 NOTE — DISCHARGE SUMMARY
"Discharge Summary    Inés Griggs MRN# [id number]   YOB: 1950 Age: 70 year old     Date of Admission: 5/27/2020    Date of Discharge: 5/28/2020    Reason for Admission: Inés Griggs is an 70 year old female who was admitted to the hospital following surgery.    Preoperative Diagnosis: Primary osteoarthritis of right hip [M16.11]    Postoperative Diagnosis: Primary osteoarthritis of right hip [M16.11]    Procedure Completed:  Right total hip arthroplasty    Hospital Course:  Ms. Griggs was admitted and underwent the above procedure. The patient tolerated the procedure well. There were no complications. Following surgery she was admitted to the floor.  During her stay she did not require any blood transfusions. Her pain was controlled with oral pain medication.  During her stay she progressed well in physical therapy and all the therapy goals were met.     Discharge Physical Exam:  /59 (BP Location: Right arm)   Pulse 71   Temp 98.1  F (36.7  C) (Oral)   Resp 16   Ht 1.638 m (5' 4.5\")   Wt 80.3 kg (177 lb)   SpO2 92%   BMI 29.91 kg/m    Neurovascularly intact, distal pulses present bilaterally.  Calves are negative bilaterally, both soft and nontender.    Assessment: Ms. Griggs is stable and doing well status post right total hip arthroplasty.    Plan: We will discharge her home on analgesics and deep venous thrombosis prophylaxis.  She will follow-up with me approximately 2 weeks from surgery.  She may call [phone] to schedule an appointment.    Meds:   Inés Griggs   Home Medication Instructions ABIGAIL:[id number]    Printed on:05/29/20 130   Medication Information                      acetaminophen (TYLENOL) 500 MG tablet  Take 1-2 tablets (500-1,000 mg) by mouth every 6 hours as needed for mild pain             aspirin (ASA) 325 MG tablet  Take 1 tablet (325 mg) by mouth daily             celecoxib (CELEBREX) 200 MG capsule  Take 1 capsule (200 mg) by mouth daily       "       Coenzyme Q10 (COQ-10 PO)  Take 1 tablet by mouth every other day             Glucosamine HCl (GLUCOSAMINE PO)  Take 1 tablet by mouth daily as needed             hydrochlorothiazide (HYDRODIURIL) 25 MG tablet  Take 25 mg by mouth daily             losartan (COZAAR) 100 MG tablet  Take 100 mg by mouth daily             oxyCODONE (ROXICODONE) 5 MG tablet  Take 1-2 tablets (5-10 mg) by mouth every 6 hours as needed for pain             rosuvastatin (CRESTOR) 10 MG tablet  Take 10 mg by mouth every other day (Patient taking differently than prescribed : RX states take 10mg daily)             senna (SENOKOT) 8.6 MG tablet  Take 1-2 tablets by mouth daily             sertraline (ZOLOFT) 100 MG tablet  Take 50 mg by mouth daily (takes 0.5 x 100mg)

## 2020-05-31 NOTE — OP NOTE
DATE OF SERVICE:  5/27/2020    SURGEON  JANIS OLSON M.D.    ASSISTANT  Lucian Castillo PA-C    PREOPERATIVE DIAGNOSIS   Right hip osteoarthritis, failed to respond to conservative management.    POSTOPERATIVE DIAGNOSIS   Right hip osteoarthritis, failed to respond to conservative management.    TITLE OF PROCEDURE   Right total hip arthroplasty, Depuy uncemented components, direct anterior approach.    PROCEDURE  The patient was brought to the operating room and after satisfactory anesthesia was placed on the [place] table. The right  lower extremity was then prepped and draped in the usual sterile fashion. Image intensification was utilized during the case for component positioning as well as leg length assessment. An incision was made just lateral to the ASIS and coursing toward the proximal femur. Dissection was carried down to the TFL. The fascia overlying the TFL was incised and dissection was carried down to the interval between TFL and rectus femoris. This was identified. A lateral cobra retractor was placed followed by a medial cobra around the femoral neck. The leash vessels, anterior circumflex, was identified and was coagulated. The underlying fascia was released. Dissection was carried down to the hip capsule. Capsule was identified and a capsulotomy was performed in a T-type fashion first along the intertrochanteric line and then secondarily up along the femoral neck and head, finally completed by releasing along the saddle of the trochanter. The capsular edges were tagged for later repair. The hip was then externally rotated and medial capsular release was performed such that the lesser trochanter could be palpated. Following this, the femoral neck was osteotomized as per the preoperative plan. The femoral head was removed with a corkscrew without difficulty. The acetabulum was exposed and was reamed sequentially up to 52 mm. This was reamed under both direct visualization as well as the aid of image  intensification. A 52 mm [place] cup was impacted into place in approximately 40 to 45 degrees of abduction, and 15 degrees of anteversion. Two screws were placed and this gave excellent fixation. The 36 mm neutral liner was impacted into place.     Attention was then directed to the femur. The hook was placed underneath the proximal aspect of the femur between the trochanteric ridge and gluteus aldo. The hip was then brought into external rotation, adduction, and extension. The femur was then carefully elevated using the appropriate releases off the inside of the greater trochanter. Once the femur was elevated, a starter broach was placed followed by sequential broaches. The broaches were performed up to size 3 Actis, which gave excellent torsinal as well as axial stability. Trial reduction was performed with a standard offset +8.5mm head. The hip was reduced and with hip reduction the combined anteversion looked excellent. The hip was brought into full extension and external rotation. There was no evidence of instability. As well, x-rays were printed and compared with the opposite side and found to have good length and restoration of offset.  It was felt that an additional stem size could not be placed.   The hip was then dislocated and then the proximal femur was then brought back up into the proximal aspect of the wound. The real size 3 actis stem, standard offset was impacted into place. Again this gave excellent torsion as well as axial stability. The real +8.5mm ceramic biolox head was impacted into place and the hip was reduced again. The image intensification confirmed excellent position of the components.   A 3 minute dilute betadine soak was performed.  The wound was thoroughly irrigated. One gram of vancomycin powder was placed deep and superficial prior to closure.  The capsule was closed with interrupted 0 Vicryl suture and tissues infiltrated with toradol/marcaine mixture. The tensor fascia was  closed with a running 0 Stratafix suture. The subcutaneous layer was closed with interrupted 2-0 Vicryl, 2-0 Stratafix, and 3-0 subcuticular monocryl was placed followed by a mesh dressing with skin glue. Sterile dressing was applied. The patient left the operating room in satisfactory condition. Patient received 1 gm of tranexamic acid pre-op and at closure.    A skilled first assistant was necessary for this procedure for assistance with patient positioning, prepping, draping, surgical visualization, performance of the repair, wound closure, and application of the dressing.  The assistant was present for the entire procedure.

## 2020-09-01 ENCOUNTER — HOSPITAL ENCOUNTER (OUTPATIENT)
Dept: MAMMOGRAPHY | Facility: CLINIC | Age: 70
Discharge: HOME | End: 2020-09-01
Attending: FAMILY MEDICINE | Admitting: FAMILY MEDICINE
Payer: COMMERCIAL

## 2020-09-01 DIAGNOSIS — Z12.31 VISIT FOR SCREENING MAMMOGRAM: ICD-10-CM

## 2020-09-01 PROCEDURE — 77067 SCR MAMMO BI INCL CAD: CPT

## 2021-06-09 DIAGNOSIS — Z11.59 ENCOUNTER FOR SCREENING FOR OTHER VIRAL DISEASES: ICD-10-CM

## 2021-07-18 ENCOUNTER — LAB (OUTPATIENT)
Dept: URGENT CARE | Facility: URGENT CARE | Age: 71
End: 2021-07-18
Attending: ORTHOPAEDIC SURGERY
Payer: COMMERCIAL

## 2021-07-18 DIAGNOSIS — Z11.59 ENCOUNTER FOR SCREENING FOR OTHER VIRAL DISEASES: ICD-10-CM

## 2021-07-18 PROCEDURE — U0003 INFECTIOUS AGENT DETECTION BY NUCLEIC ACID (DNA OR RNA); SEVERE ACUTE RESPIRATORY SYNDROME CORONAVIRUS 2 (SARS-COV-2) (CORONAVIRUS DISEASE [COVID-19]), AMPLIFIED PROBE TECHNIQUE, MAKING USE OF HIGH THROUGHPUT TECHNOLOGIES AS DESCRIBED BY CMS-2020-01-R: HCPCS

## 2021-07-18 PROCEDURE — U0005 INFEC AGEN DETEC AMPLI PROBE: HCPCS

## 2021-07-19 LAB — SARS-COV-2 RNA RESP QL NAA+PROBE: NEGATIVE

## 2021-07-20 RX ORDER — ASPIRIN 81 MG/1
81 TABLET ORAL DAILY
Status: ON HOLD | COMMUNITY
End: 2021-07-22

## 2021-07-20 RX ORDER — LOSARTAN POTASSIUM AND HYDROCHLOROTHIAZIDE 25; 100 MG/1; MG/1
1 TABLET ORAL EVERY MORNING
COMMUNITY

## 2021-07-20 RX ORDER — CALCIUM CARBONATE 500 MG/1
1 TABLET, CHEWABLE ORAL 2 TIMES DAILY PRN
COMMUNITY

## 2021-07-20 RX ORDER — NAPROXEN 500 MG/1
500-1000 TABLET ORAL 2 TIMES DAILY PRN
Status: ON HOLD | COMMUNITY
End: 2021-07-22

## 2021-07-20 RX ORDER — DIPHENHYDRAMINE HCL 25 MG
25 TABLET ORAL
COMMUNITY

## 2021-07-20 RX ORDER — MULTIPLE VITAMINS W/ MINERALS TAB 9MG-400MCG
1 TAB ORAL DAILY
COMMUNITY
End: 2024-03-14

## 2021-07-20 NOTE — PROGRESS NOTES
PTA medications updated by Medication Scribe prior to surgery via phone call with patient (last doses completed by Nurse)     Medication history sources: Patient, Surescripts, H&P and Patient's home med list  In the past week, patient estimated taking medication this percent of the time: Greater than 90%  Adherence assessment: N/A Not Observed    Significant changes made to the medication list:  None      Additional medication history information:   None    Medication reconciliation completed by provider prior to medication history? No    Time spent in this activity: 20 minutes    The information provided in this note is only as accurate as the sources available at the time of update(s)      Prior to Admission medications    Medication Sig Last Dose Taking? Auth Provider   acetaminophen (TYLENOL) 500 MG tablet Take 1-2 tablets (500-1,000 mg) by mouth every 6 hours as needed for mild pain  at PRN Yes Efren Castillo PA-C   aspirin 81 MG EC tablet Take 81 mg by mouth daily 7/13/2021 Yes Reported, Patient   calcium carbonate (TUMS) 500 MG chewable tablet Take 1 chew tab by mouth 2 times daily as needed for heartburn  at PRN Yes Reported, Patient   Coenzyme Q10 (COQ-10 PO) Take 1 tablet by mouth every other day 7/13/2021 Yes Reported, Patient   diphenhydrAMINE (BENADRYL) 25 MG tablet Take 25 mg by mouth nightly as needed for itching or allergies  at PRN Yes Reported, Patient   Glucosamine HCl (GLUCOSAMINE PO) Take 1 tablet by mouth daily as needed 7/13/2021 Yes Reported, Patient   losartan-hydrochlorothiazide (HYZAAR) 100-25 MG tablet Take 1 tablet by mouth every morning  at AM Yes Reported, Patient   multivitamin w/minerals (THERA-VIT-M) tablet Take 1 tablet by mouth daily 7/13/2021 Yes Reported, Patient   naproxen (NAPROSYN) 500 MG tablet Take 500-1,000 mg by mouth 2 times daily as needed for moderate pain 7/13/2021 Yes Reported, Patient   Omega-3 Fatty Acids (FISH OIL PO) Take 2 capsules by mouth daily  7/13/2021 Yes Reported, Patient   rosuvastatin (CRESTOR) 10 MG tablet Take 10 mg by mouth three times a week (Patient taking differently than prescribed due to muscle pain: RX states take 10mg daily) 7/19/2021 Yes Reported, Patient   sertraline (ZOLOFT) 100 MG tablet Take 50 mg by mouth daily (takes 0.5 x 100mg)  at AM Yes Reported, Patient

## 2021-07-21 ENCOUNTER — APPOINTMENT (OUTPATIENT)
Dept: PHYSICAL THERAPY | Facility: CLINIC | Age: 71
End: 2021-07-21
Attending: ORTHOPAEDIC SURGERY
Payer: COMMERCIAL

## 2021-07-21 ENCOUNTER — ANESTHESIA (OUTPATIENT)
Dept: SURGERY | Facility: CLINIC | Age: 71
End: 2021-07-21
Payer: COMMERCIAL

## 2021-07-21 ENCOUNTER — HOSPITAL ENCOUNTER (OUTPATIENT)
Facility: CLINIC | Age: 71
Discharge: HOME | End: 2021-07-22
Attending: ORTHOPAEDIC SURGERY | Admitting: ORTHOPAEDIC SURGERY
Payer: COMMERCIAL

## 2021-07-21 ENCOUNTER — APPOINTMENT (OUTPATIENT)
Dept: GENERAL RADIOLOGY | Facility: CLINIC | Age: 71
End: 2021-07-21
Attending: ORTHOPAEDIC SURGERY
Payer: COMMERCIAL

## 2021-07-21 ENCOUNTER — ANESTHESIA EVENT (OUTPATIENT)
Dept: SURGERY | Facility: CLINIC | Age: 71
End: 2021-07-21
Payer: COMMERCIAL

## 2021-07-21 DIAGNOSIS — Z47.1 AFTERCARE FOLLOWING LEFT KNEE JOINT REPLACEMENT SURGERY: Primary | ICD-10-CM

## 2021-07-21 DIAGNOSIS — Z96.652 AFTERCARE FOLLOWING LEFT KNEE JOINT REPLACEMENT SURGERY: Primary | ICD-10-CM

## 2021-07-21 LAB
CREAT SERPL-MCNC: 0.69 MG/DL (ref 0.52–1.04)
GFR SERPL CREATININE-BSD FRML MDRD: 88 ML/MIN/1.73M2
HGB BLD-MCNC: 13.2 G/DL (ref 11.7–15.7)
POTASSIUM BLD-SCNC: 3.8 MMOL/L (ref 3.4–5.3)

## 2021-07-21 PROCEDURE — 258N000003 HC RX IP 258 OP 636: Performed by: NURSE ANESTHETIST, CERTIFIED REGISTERED

## 2021-07-21 PROCEDURE — 36415 COLL VENOUS BLD VENIPUNCTURE: CPT | Performed by: PHYSICIAN ASSISTANT

## 2021-07-21 PROCEDURE — 250N000011 HC RX IP 250 OP 636: Performed by: ANESTHESIOLOGY

## 2021-07-21 PROCEDURE — C1776 JOINT DEVICE (IMPLANTABLE): HCPCS | Performed by: ORTHOPAEDIC SURGERY

## 2021-07-21 PROCEDURE — 250N000009 HC RX 250: Performed by: ORTHOPAEDIC SURGERY

## 2021-07-21 PROCEDURE — 258N000001 HC RX 258: Performed by: ORTHOPAEDIC SURGERY

## 2021-07-21 PROCEDURE — 250N000011 HC RX IP 250 OP 636: Performed by: ORTHOPAEDIC SURGERY

## 2021-07-21 PROCEDURE — 278N000051 HC OR IMPLANT GENERAL: Performed by: ORTHOPAEDIC SURGERY

## 2021-07-21 PROCEDURE — 82565 ASSAY OF CREATININE: CPT | Performed by: PHYSICIAN ASSISTANT

## 2021-07-21 PROCEDURE — 250N000013 HC RX MED GY IP 250 OP 250 PS 637: Performed by: ORTHOPAEDIC SURGERY

## 2021-07-21 PROCEDURE — 250N000009 HC RX 250: Performed by: NURSE ANESTHETIST, CERTIFIED REGISTERED

## 2021-07-21 PROCEDURE — 710N000009 HC RECOVERY PHASE 1, LEVEL 1, PER MIN: Performed by: ORTHOPAEDIC SURGERY

## 2021-07-21 PROCEDURE — 250N000011 HC RX IP 250 OP 636: Performed by: NURSE ANESTHETIST, CERTIFIED REGISTERED

## 2021-07-21 PROCEDURE — 999N000141 HC STATISTIC PRE-PROCEDURE NURSING ASSESSMENT: Performed by: ORTHOPAEDIC SURGERY

## 2021-07-21 PROCEDURE — 97530 THERAPEUTIC ACTIVITIES: CPT | Mod: GP | Performed by: PHYSICAL THERAPIST

## 2021-07-21 PROCEDURE — 250N000011 HC RX IP 250 OP 636: Performed by: PHYSICIAN ASSISTANT

## 2021-07-21 PROCEDURE — 258N000003 HC RX IP 258 OP 636: Performed by: ANESTHESIOLOGY

## 2021-07-21 PROCEDURE — 250N000013 HC RX MED GY IP 250 OP 250 PS 637: Performed by: PHYSICIAN ASSISTANT

## 2021-07-21 PROCEDURE — 250N000009 HC RX 250: Performed by: PHYSICIAN ASSISTANT

## 2021-07-21 PROCEDURE — 999N000063 XR KNEE PORT LEFT 1/2 VIEWS: Mod: LT

## 2021-07-21 PROCEDURE — 370N000017 HC ANESTHESIA TECHNICAL FEE, PER MIN: Performed by: ORTHOPAEDIC SURGERY

## 2021-07-21 PROCEDURE — 272N000001 HC OR GENERAL SUPPLY STERILE: Performed by: ORTHOPAEDIC SURGERY

## 2021-07-21 PROCEDURE — 97116 GAIT TRAINING THERAPY: CPT | Mod: GP | Performed by: PHYSICAL THERAPIST

## 2021-07-21 PROCEDURE — 258N000003 HC RX IP 258 OP 636: Performed by: PHYSICIAN ASSISTANT

## 2021-07-21 PROCEDURE — 85018 HEMOGLOBIN: CPT | Performed by: PHYSICIAN ASSISTANT

## 2021-07-21 PROCEDURE — 84132 ASSAY OF SERUM POTASSIUM: CPT | Performed by: ANESTHESIOLOGY

## 2021-07-21 PROCEDURE — 360N000077 HC SURGERY LEVEL 4, PER MIN: Performed by: ORTHOPAEDIC SURGERY

## 2021-07-21 PROCEDURE — 97161 PT EVAL LOW COMPLEX 20 MIN: CPT | Mod: GP | Performed by: PHYSICAL THERAPIST

## 2021-07-21 DEVICE — POSTERIOR STABILIZED FEMORAL
Type: IMPLANTABLE DEVICE | Site: KNEE | Status: FUNCTIONAL
Brand: TRIATHLON

## 2021-07-21 DEVICE — UNIVERSAL TIBIAL BASEPLATE
Type: IMPLANTABLE DEVICE | Site: KNEE | Status: FUNCTIONAL
Brand: TRIATHLON

## 2021-07-21 DEVICE — BONE CEMENT RADIOPAQUE SIMPLEX HV FULL DOSE 6194-1-001: Type: IMPLANTABLE DEVICE | Site: KNEE | Status: FUNCTIONAL

## 2021-07-21 DEVICE — PATELLA
Type: IMPLANTABLE DEVICE | Site: KNEE | Status: FUNCTIONAL
Brand: TRIATHLON

## 2021-07-21 DEVICE — FEMORAL DISTAL FIXATION PEG
Type: IMPLANTABLE DEVICE | Site: KNEE | Status: FUNCTIONAL
Brand: TRIATHLON

## 2021-07-21 RX ORDER — CEFAZOLIN SODIUM 2 G/100ML
2 INJECTION, SOLUTION INTRAVENOUS
Status: COMPLETED | OUTPATIENT
Start: 2021-07-21 | End: 2021-07-21

## 2021-07-21 RX ORDER — OXYCODONE HYDROCHLORIDE 5 MG/1
10 TABLET ORAL EVERY 4 HOURS PRN
Status: DISCONTINUED | OUTPATIENT
Start: 2021-07-21 | End: 2021-07-22 | Stop reason: HOSPADM

## 2021-07-21 RX ORDER — SODIUM CHLORIDE, SODIUM LACTATE, POTASSIUM CHLORIDE, CALCIUM CHLORIDE 600; 310; 30; 20 MG/100ML; MG/100ML; MG/100ML; MG/100ML
INJECTION, SOLUTION INTRAVENOUS CONTINUOUS
Status: DISCONTINUED | OUTPATIENT
Start: 2021-07-21 | End: 2021-07-21 | Stop reason: HOSPADM

## 2021-07-21 RX ORDER — KETOROLAC TROMETHAMINE 15 MG/ML
15 INJECTION, SOLUTION INTRAMUSCULAR; INTRAVENOUS EVERY 6 HOURS
Status: DISCONTINUED | OUTPATIENT
Start: 2021-07-21 | End: 2021-07-22 | Stop reason: HOSPADM

## 2021-07-21 RX ORDER — SODIUM CHLORIDE, SODIUM LACTATE, POTASSIUM CHLORIDE, CALCIUM CHLORIDE 600; 310; 30; 20 MG/100ML; MG/100ML; MG/100ML; MG/100ML
INJECTION, SOLUTION INTRAVENOUS CONTINUOUS
Status: DISCONTINUED | OUTPATIENT
Start: 2021-07-21 | End: 2021-07-22 | Stop reason: HOSPADM

## 2021-07-21 RX ORDER — CALCIUM CARBONATE 500 MG/1
500 TABLET, CHEWABLE ORAL 4 TIMES DAILY PRN
Status: DISCONTINUED | OUTPATIENT
Start: 2021-07-21 | End: 2021-07-22 | Stop reason: HOSPADM

## 2021-07-21 RX ORDER — DIMENHYDRINATE 50 MG/ML
25 INJECTION, SOLUTION INTRAMUSCULAR; INTRAVENOUS
Status: DISCONTINUED | OUTPATIENT
Start: 2021-07-21 | End: 2021-07-21 | Stop reason: HOSPADM

## 2021-07-21 RX ORDER — ALBUTEROL SULFATE 0.83 MG/ML
2.5 SOLUTION RESPIRATORY (INHALATION) EVERY 4 HOURS PRN
Status: DISCONTINUED | OUTPATIENT
Start: 2021-07-21 | End: 2021-07-21 | Stop reason: HOSPADM

## 2021-07-21 RX ORDER — METHOCARBAMOL 500 MG/1
500 TABLET, FILM COATED ORAL EVERY 6 HOURS PRN
Status: DISCONTINUED | OUTPATIENT
Start: 2021-07-21 | End: 2021-07-22 | Stop reason: HOSPADM

## 2021-07-21 RX ORDER — LABETALOL HYDROCHLORIDE 5 MG/ML
10 INJECTION, SOLUTION INTRAVENOUS
Status: DISCONTINUED | OUTPATIENT
Start: 2021-07-21 | End: 2021-07-21 | Stop reason: HOSPADM

## 2021-07-21 RX ORDER — HYDROMORPHONE HCL IN WATER/PF 6 MG/30 ML
0.4 PATIENT CONTROLLED ANALGESIA SYRINGE INTRAVENOUS
Status: DISCONTINUED | OUTPATIENT
Start: 2021-07-21 | End: 2021-07-22 | Stop reason: HOSPADM

## 2021-07-21 RX ORDER — ACETAMINOPHEN 325 MG/1
975 TABLET ORAL EVERY 8 HOURS
Status: DISCONTINUED | OUTPATIENT
Start: 2021-07-21 | End: 2021-07-22 | Stop reason: HOSPADM

## 2021-07-21 RX ORDER — VANCOMYCIN HYDROCHLORIDE 1 G/20ML
INJECTION, POWDER, LYOPHILIZED, FOR SOLUTION INTRAVENOUS PRN
Status: DISCONTINUED | OUTPATIENT
Start: 2021-07-21 | End: 2021-07-21 | Stop reason: HOSPADM

## 2021-07-21 RX ORDER — FENTANYL CITRATE 0.05 MG/ML
50 INJECTION, SOLUTION INTRAMUSCULAR; INTRAVENOUS EVERY 5 MIN PRN
Status: DISCONTINUED | OUTPATIENT
Start: 2021-07-21 | End: 2021-07-21 | Stop reason: HOSPADM

## 2021-07-21 RX ORDER — HYDROMORPHONE HCL IN WATER/PF 6 MG/30 ML
0.2 PATIENT CONTROLLED ANALGESIA SYRINGE INTRAVENOUS
Status: DISCONTINUED | OUTPATIENT
Start: 2021-07-21 | End: 2021-07-22 | Stop reason: HOSPADM

## 2021-07-21 RX ORDER — MEPERIDINE HYDROCHLORIDE 25 MG/ML
12.5 INJECTION INTRAMUSCULAR; INTRAVENOUS; SUBCUTANEOUS EVERY 5 MIN PRN
Status: DISCONTINUED | OUTPATIENT
Start: 2021-07-21 | End: 2021-07-21 | Stop reason: HOSPADM

## 2021-07-21 RX ORDER — LOSARTAN POTASSIUM AND HYDROCHLOROTHIAZIDE 25; 100 MG/1; MG/1
1 TABLET ORAL EVERY MORNING
Status: DISCONTINUED | OUTPATIENT
Start: 2021-07-22 | End: 2021-07-22 | Stop reason: HOSPADM

## 2021-07-21 RX ORDER — TRANEXAMIC ACID 650 MG/1
1950 TABLET ORAL ONCE
Status: COMPLETED | OUTPATIENT
Start: 2021-07-21 | End: 2021-07-21

## 2021-07-21 RX ORDER — DEXAMETHASONE SODIUM PHOSPHATE 4 MG/ML
INJECTION, SOLUTION INTRA-ARTICULAR; INTRALESIONAL; INTRAMUSCULAR; INTRAVENOUS; SOFT TISSUE PRN
Status: DISCONTINUED | OUTPATIENT
Start: 2021-07-21 | End: 2021-07-21

## 2021-07-21 RX ORDER — HYDRALAZINE HYDROCHLORIDE 20 MG/ML
2.5-5 INJECTION INTRAMUSCULAR; INTRAVENOUS EVERY 10 MIN PRN
Status: DISCONTINUED | OUTPATIENT
Start: 2021-07-21 | End: 2021-07-21 | Stop reason: HOSPADM

## 2021-07-21 RX ORDER — CEFAZOLIN SODIUM 2 G/100ML
2 INJECTION, SOLUTION INTRAVENOUS SEE ADMIN INSTRUCTIONS
Status: DISCONTINUED | OUTPATIENT
Start: 2021-07-21 | End: 2021-07-21 | Stop reason: HOSPADM

## 2021-07-21 RX ORDER — ONDANSETRON 2 MG/ML
4 INJECTION INTRAMUSCULAR; INTRAVENOUS EVERY 30 MIN PRN
Status: DISCONTINUED | OUTPATIENT
Start: 2021-07-21 | End: 2021-07-21 | Stop reason: HOSPADM

## 2021-07-21 RX ORDER — NALOXONE HYDROCHLORIDE 0.4 MG/ML
0.4 INJECTION, SOLUTION INTRAMUSCULAR; INTRAVENOUS; SUBCUTANEOUS
Status: DISCONTINUED | OUTPATIENT
Start: 2021-07-21 | End: 2021-07-22 | Stop reason: HOSPADM

## 2021-07-21 RX ORDER — DIPHENHYDRAMINE HCL 12.5MG/5ML
12.5 LIQUID (ML) ORAL EVERY 6 HOURS PRN
Status: DISCONTINUED | OUTPATIENT
Start: 2021-07-21 | End: 2021-07-22 | Stop reason: HOSPADM

## 2021-07-21 RX ORDER — POLYETHYLENE GLYCOL 3350 17 G/17G
17 POWDER, FOR SOLUTION ORAL DAILY
Status: DISCONTINUED | OUTPATIENT
Start: 2021-07-22 | End: 2021-07-22 | Stop reason: HOSPADM

## 2021-07-21 RX ORDER — HYDROXYZINE HYDROCHLORIDE 10 MG/1
10 TABLET, FILM COATED ORAL EVERY 6 HOURS PRN
Status: DISCONTINUED | OUTPATIENT
Start: 2021-07-21 | End: 2021-07-22 | Stop reason: HOSPADM

## 2021-07-21 RX ORDER — BUPIVACAINE HYDROCHLORIDE 7.5 MG/ML
INJECTION, SOLUTION INTRASPINAL
Status: COMPLETED | OUTPATIENT
Start: 2021-07-21 | End: 2021-07-21

## 2021-07-21 RX ORDER — FENTANYL CITRATE 50 UG/ML
INJECTION, SOLUTION INTRAMUSCULAR; INTRAVENOUS PRN
Status: DISCONTINUED | OUTPATIENT
Start: 2021-07-21 | End: 2021-07-21

## 2021-07-21 RX ORDER — DEXAMETHASONE SODIUM PHOSPHATE 10 MG/ML
4 INJECTION, SOLUTION INTRAMUSCULAR; INTRAVENOUS
Status: DISCONTINUED | OUTPATIENT
Start: 2021-07-21 | End: 2021-07-21 | Stop reason: HOSPADM

## 2021-07-21 RX ORDER — NALOXONE HYDROCHLORIDE 0.4 MG/ML
0.2 INJECTION, SOLUTION INTRAMUSCULAR; INTRAVENOUS; SUBCUTANEOUS
Status: DISCONTINUED | OUTPATIENT
Start: 2021-07-21 | End: 2021-07-22 | Stop reason: HOSPADM

## 2021-07-21 RX ORDER — HYDROMORPHONE HCL IN WATER/PF 6 MG/30 ML
0.4 PATIENT CONTROLLED ANALGESIA SYRINGE INTRAVENOUS EVERY 5 MIN PRN
Status: DISCONTINUED | OUTPATIENT
Start: 2021-07-21 | End: 2021-07-21 | Stop reason: HOSPADM

## 2021-07-21 RX ORDER — AMOXICILLIN 250 MG
1-2 CAPSULE ORAL 2 TIMES DAILY
Qty: 30 TABLET | Refills: 0 | Status: SHIPPED | OUTPATIENT
Start: 2021-07-21 | End: 2024-03-08

## 2021-07-21 RX ORDER — AMOXICILLIN 250 MG
1 CAPSULE ORAL 2 TIMES DAILY
Status: DISCONTINUED | OUTPATIENT
Start: 2021-07-21 | End: 2021-07-22 | Stop reason: HOSPADM

## 2021-07-21 RX ORDER — LIDOCAINE 40 MG/G
CREAM TOPICAL
Status: DISCONTINUED | OUTPATIENT
Start: 2021-07-21 | End: 2021-07-22 | Stop reason: HOSPADM

## 2021-07-21 RX ORDER — BISACODYL 10 MG
10 SUPPOSITORY, RECTAL RECTAL DAILY PRN
Status: DISCONTINUED | OUTPATIENT
Start: 2021-07-21 | End: 2021-07-22 | Stop reason: HOSPADM

## 2021-07-21 RX ORDER — CEFAZOLIN SODIUM 1 G/3ML
1 INJECTION, POWDER, FOR SOLUTION INTRAMUSCULAR; INTRAVENOUS EVERY 8 HOURS
Status: COMPLETED | OUTPATIENT
Start: 2021-07-21 | End: 2021-07-22

## 2021-07-21 RX ORDER — ACETAMINOPHEN 325 MG/1
650 TABLET ORAL EVERY 4 HOURS PRN
Status: DISCONTINUED | OUTPATIENT
Start: 2021-07-24 | End: 2021-07-22 | Stop reason: HOSPADM

## 2021-07-21 RX ORDER — KETOROLAC TROMETHAMINE 10 MG/1
10 TABLET, FILM COATED ORAL EVERY 8 HOURS
Qty: 6 TABLET | Refills: 0 | Status: SHIPPED | OUTPATIENT
Start: 2021-07-21 | End: 2021-07-23

## 2021-07-21 RX ORDER — ONDANSETRON 4 MG/1
4 TABLET, ORALLY DISINTEGRATING ORAL EVERY 6 HOURS PRN
Status: DISCONTINUED | OUTPATIENT
Start: 2021-07-21 | End: 2021-07-22 | Stop reason: HOSPADM

## 2021-07-21 RX ORDER — MAGNESIUM HYDROXIDE 1200 MG/15ML
LIQUID ORAL PRN
Status: DISCONTINUED | OUTPATIENT
Start: 2021-07-21 | End: 2021-07-21 | Stop reason: HOSPADM

## 2021-07-21 RX ORDER — OXYCODONE HYDROCHLORIDE 5 MG/1
5 TABLET ORAL EVERY 4 HOURS PRN
Status: DISCONTINUED | OUTPATIENT
Start: 2021-07-21 | End: 2021-07-22 | Stop reason: HOSPADM

## 2021-07-21 RX ORDER — CELECOXIB 200 MG/1
200 CAPSULE ORAL DAILY
Qty: 30 CAPSULE | Refills: 0 | Status: SHIPPED | OUTPATIENT
Start: 2021-07-21 | End: 2024-03-08

## 2021-07-21 RX ORDER — CELECOXIB 200 MG/1
400 CAPSULE ORAL ONCE
Status: COMPLETED | OUTPATIENT
Start: 2021-07-21 | End: 2021-07-21

## 2021-07-21 RX ORDER — PROCHLORPERAZINE MALEATE 5 MG
5 TABLET ORAL EVERY 6 HOURS PRN
Status: DISCONTINUED | OUTPATIENT
Start: 2021-07-21 | End: 2021-07-22 | Stop reason: HOSPADM

## 2021-07-21 RX ORDER — ONDANSETRON 2 MG/ML
4 INJECTION INTRAMUSCULAR; INTRAVENOUS EVERY 6 HOURS PRN
Status: DISCONTINUED | OUTPATIENT
Start: 2021-07-21 | End: 2021-07-22 | Stop reason: HOSPADM

## 2021-07-21 RX ORDER — ACETAMINOPHEN 325 MG/1
975 TABLET ORAL 4 TIMES DAILY PRN
Qty: 100 TABLET | Refills: 0 | Status: SHIPPED | OUTPATIENT
Start: 2021-07-21

## 2021-07-21 RX ORDER — ONDANSETRON 4 MG/1
4 TABLET, ORALLY DISINTEGRATING ORAL EVERY 30 MIN PRN
Status: DISCONTINUED | OUTPATIENT
Start: 2021-07-21 | End: 2021-07-21 | Stop reason: HOSPADM

## 2021-07-21 RX ORDER — ONDANSETRON 2 MG/ML
INJECTION INTRAMUSCULAR; INTRAVENOUS PRN
Status: DISCONTINUED | OUTPATIENT
Start: 2021-07-21 | End: 2021-07-21

## 2021-07-21 RX ORDER — PREGABALIN 150 MG/1
150 CAPSULE ORAL ONCE
Status: COMPLETED | OUTPATIENT
Start: 2021-07-21 | End: 2021-07-21

## 2021-07-21 RX ORDER — PROPOFOL 10 MG/ML
INJECTION, EMULSION INTRAVENOUS CONTINUOUS PRN
Status: DISCONTINUED | OUTPATIENT
Start: 2021-07-21 | End: 2021-07-21

## 2021-07-21 RX ADMIN — BUPIVACAINE HYDROCHLORIDE IN DEXTROSE 1.5 ML: 7.5 INJECTION, SOLUTION SUBARACHNOID at 11:40

## 2021-07-21 RX ADMIN — ONDANSETRON 4 MG: 2 INJECTION INTRAMUSCULAR; INTRAVENOUS at 12:05

## 2021-07-21 RX ADMIN — MIDAZOLAM 2 MG: 1 INJECTION INTRAMUSCULAR; INTRAVENOUS at 11:28

## 2021-07-21 RX ADMIN — OXYCODONE HYDROCHLORIDE 5 MG: 5 TABLET ORAL at 20:59

## 2021-07-21 RX ADMIN — PHENYLEPHRINE HYDROCHLORIDE 100 MCG: 10 INJECTION INTRAVENOUS at 11:52

## 2021-07-21 RX ADMIN — PHENYLEPHRINE HYDROCHLORIDE 100 MCG: 10 INJECTION INTRAVENOUS at 12:20

## 2021-07-21 RX ADMIN — KETOROLAC TROMETHAMINE 15 MG: 15 INJECTION, SOLUTION INTRAMUSCULAR; INTRAVENOUS at 22:21

## 2021-07-21 RX ADMIN — PHENYLEPHRINE HYDROCHLORIDE 100 MCG: 10 INJECTION INTRAVENOUS at 12:10

## 2021-07-21 RX ADMIN — PHENYLEPHRINE HYDROCHLORIDE 100 MCG: 10 INJECTION INTRAVENOUS at 12:08

## 2021-07-21 RX ADMIN — PROPOFOL 75 MCG/KG/MIN: 10 INJECTION, EMULSION INTRAVENOUS at 11:51

## 2021-07-21 RX ADMIN — ACETAMINOPHEN 975 MG: 325 TABLET, FILM COATED ORAL at 18:16

## 2021-07-21 RX ADMIN — PHENYLEPHRINE HYDROCHLORIDE 100 MCG: 10 INJECTION INTRAVENOUS at 12:15

## 2021-07-21 RX ADMIN — CEFAZOLIN SODIUM 2 G: 2 INJECTION, SOLUTION INTRAVENOUS at 11:58

## 2021-07-21 RX ADMIN — PREGABALIN 150 MG: 150 CAPSULE ORAL at 09:46

## 2021-07-21 RX ADMIN — CELECOXIB 400 MG: 200 CAPSULE ORAL at 09:46

## 2021-07-21 RX ADMIN — PHENYLEPHRINE HYDROCHLORIDE 100 MCG: 10 INJECTION INTRAVENOUS at 12:05

## 2021-07-21 RX ADMIN — SODIUM CHLORIDE, POTASSIUM CHLORIDE, SODIUM LACTATE AND CALCIUM CHLORIDE: 600; 310; 30; 20 INJECTION, SOLUTION INTRAVENOUS at 09:49

## 2021-07-21 RX ADMIN — TRANEXAMIC ACID 1950 MG: 650 TABLET ORAL at 09:46

## 2021-07-21 RX ADMIN — PHENYLEPHRINE HYDROCHLORIDE 100 MCG: 10 INJECTION INTRAVENOUS at 11:55

## 2021-07-21 RX ADMIN — PHENYLEPHRINE HYDROCHLORIDE 100 MCG: 10 INJECTION INTRAVENOUS at 12:25

## 2021-07-21 RX ADMIN — SENNOSIDES AND DOCUSATE SODIUM 1 TABLET: 8.6; 5 TABLET ORAL at 20:46

## 2021-07-21 RX ADMIN — PHENYLEPHRINE HYDROCHLORIDE 100 MCG: 10 INJECTION INTRAVENOUS at 12:01

## 2021-07-21 RX ADMIN — SODIUM CHLORIDE, POTASSIUM CHLORIDE, SODIUM LACTATE AND CALCIUM CHLORIDE: 600; 310; 30; 20 INJECTION, SOLUTION INTRAVENOUS at 14:06

## 2021-07-21 RX ADMIN — PHENYLEPHRINE HYDROCHLORIDE 100 MCG: 10 INJECTION INTRAVENOUS at 11:50

## 2021-07-21 RX ADMIN — DEXAMETHASONE SODIUM PHOSPHATE 4 MG: 4 INJECTION, SOLUTION INTRA-ARTICULAR; INTRALESIONAL; INTRAMUSCULAR; INTRAVENOUS; SOFT TISSUE at 12:05

## 2021-07-21 RX ADMIN — PHENYLEPHRINE HYDROCHLORIDE 0.3 MCG/KG/MIN: 10 INJECTION INTRAVENOUS at 12:25

## 2021-07-21 RX ADMIN — MIDAZOLAM HYDROCHLORIDE 2 MG: 1 INJECTION, SOLUTION INTRAMUSCULAR; INTRAVENOUS at 09:41

## 2021-07-21 RX ADMIN — FENTANYL CITRATE 50 MCG: 50 INJECTION, SOLUTION INTRAMUSCULAR; INTRAVENOUS at 11:35

## 2021-07-21 RX ADMIN — CEFAZOLIN 1 G: 1 INJECTION, POWDER, FOR SOLUTION INTRAMUSCULAR; INTRAVENOUS at 20:46

## 2021-07-21 ASSESSMENT — MIFFLIN-ST. JEOR: SCORE: 1299.48

## 2021-07-21 ASSESSMENT — LIFESTYLE VARIABLES: TOBACCO_USE: 1

## 2021-07-21 NOTE — BRIEF OP NOTE
[facility]    Brief Operative Note    Pre-operative diagnosis: Left knee OA  Post-operative diagnosis same  Procedure: LEFT TOTAL KNEE ARTHROPLASTY  Surgeon(s) and Role:     * Usama Christianson MD - Primary     * Efren Castillo PA-C - Assisting     * Julia Faulkner PA-C - Assisting    Anesthesia: Spinal   Estimated blood loss: 100 ml  Drains:  None  Specimens: None  Findings:  Advanced OA  Complications: None    Plan: DC home POD1 w/family assist.  DVT prophylaxis w/ASA 325mg QD x6wks.      Implants:   Implant Name Type Inv. Item Serial No.  Lot No. LRB No. Used Action   BONE CEMENT RADIOPAQUE SIMPLEX HV FULL DOSE 6194-1-001 - [id number] Cement, Bone BONE CEMENT RADIOPAQUE SIMPLEX HV FULL DOSE 6194-1-001  [employer] [id number] Left 1 Implanted   BONE CEMENT RADIOPAQUE SIMPLEX HV FULL DOSE 6194-1-001 - [id number] Cement, Bone BONE CEMENT RADIOPAQUE SIMPLEX HV FULL DOSE 6194-1-001  [employer] [id number] Left 1 Implanted   IMP PEG DISTAL FEMORAL STRK 5575-X-000 - [id number] Total Joint Component/Insert IMP PEG DISTAL FEMORAL STRK 5575-X-000  [employer] [id number] Left 1 Implanted   IMP COMP FEM STRK TRIATHLN PS LT 3 5515-F-301 - [id number] Total Joint Component/Insert IMP COMP FEM STRK TRIATHLN PS LT 3 5515-F-301  [employer] [id number] Left 1 Implanted   IMP INSERT BASEPLATE TIBIAL STRK TRI 2 5521-B-200 - [id number] Total Joint Component/Insert IMP INSERT BASEPLATE TIBIAL STRK TRI 2 5521-B-200  [employer]GR[employer]uth [employer] â[employer]“ [employer] [id number] Left 1 Implanted   IMP COMP PATELLA HOWM ASYM TRI [id number] 5551-L-320 - [id number] Total Joint Component/Insert IMP COMP PATELLA HOWM ASYM TRI [id number] 5551-L-320  [employer] [id number] Left 1 Implanted

## 2021-07-21 NOTE — ANESTHESIA PROCEDURE NOTES
"Adductor canal and Femoral Procedure Note  Pre-Procedure   Staff -        Anesthesiologist:  Barbara Infante MD       Performed By: anesthesiologist       Location: pre-op       Pre-Anesthestic Checklist: patient identified, IV checked, site marked, risks and benefits discussed, informed consent, monitors and equipment checked, pre-op evaluation, at physician/surgeon's request and post-op pain management  Timeout:       Correct Patient: Yes        Correct Procedure: Yes        Correct Site: Yes        Correct Position: Yes        Correct Laterality: Yes        Site Marked: Yes  Procedure Documentation  Procedure: Adductor canal, Femoral       Laterality: left       Patient Position: supine       Patient Prep/Sterile Barriers: sterile gloves, mask, \"no-touch\" technique        Skin prep: Chloraprep       Local skin infiltrated with 3 mL of 1% lidocaine.        Needle Type: insulated and short bevel (Pajunk)       Needle Gauge: 21.        Needle Length (millimeters): 100        Ultrasound guided       1. Ultrasound was used to identify targeted nerve, plexus, vascular marker, or fascial plane and place a needle adjacent to it in real-time.       2. Ultrasound was used to visualize the spread of anesthetic in close proximity to the above referenced structure.       3. A permanent image is entered into the patient's record.       4. The visualized anatomic structures appeared normal.       5. There were no apparent abnormal pathologic findings.    Assessment/Narrative         The placement was negative for: blood aspirated, painful injection and site bleeding       Paresthesias: No.    Test dose of mL at.         Test dose negative, 3 minutes after injection, for signs of intravascular, subdural, or intrathecal injection.     Bolus given via needle..        Secured via.        Insertion/Infusion Method: Single Shot       Complications: none       Injection made incrementally with aspirations every 5 mL.    Comments:  " 0.5% Bupivacaine with 1:400,000 epinephrine injected.  Patient tolerated the procedure well.    The surgeon has given a verbal order transferring care of this patient to me for the performance of a regional analgesia block for post-op pain control. It is requested of me because I am uniquely trained and qualified to perform this block and the surgeon is neither trained nor qualified to perform this procedure.

## 2021-07-21 NOTE — CONSULTS
[facility]  Consult Note - Hospitalist Service     Date of Admission:  7/21/2021  Consult Requested by: Dr. Mckinley  Reason for Consult: Post-op co-medical management following left TKA.    PRIMARY CARE PROVIDER:    Pilar Pop    Assessment & Plan   Inés Griggs is a 71 year old female admitted on 7/21/2021.    Past medical history significant for OA, Osteopenia, HTN, HLP, CHRIS who underwent an elective left TKA.      Reviewed patient's EMR.  Patient's PTA medications include losartan-hydrochlorothiazide, Crestor and Sertraline.  Discussed with floor nurse and patient has no complaints and and vital signs are stable.      At this time, a formal consult will be deferred.  Hospitalist service will sign-off.  Please call or reconsult if any questions or concerns arise.      OA with degenerative changes of the left knee s/p left TKA  POD# 0.   - Orthopedic Service is managing.   --Defer analgesic management, DVT prophylaxis, PT/OT.     --Defer resumption of ASA to Ortho.    - HGB check in the morning.    - Encourage utilization of incentive spirometer.     Osteopenia  - No interventions.      HTN  - Resumed on PTA losartan-hydrochlorothiazide 100-25 mg/d with hold parameters.      HLP  - Resumed on PTA Crestor 10 mg at bedtime.      CHRIS  - Resumed on PTA Sertraline 100 mg/d.      Risk Factors Present on Admission              # Platelet Defect: home medication list includes an antiplatelet medication        Diet: Advance Diet as Tolerated: Regular Diet Adult  Regular Diet Adult     DVT Prophylaxis: Defer to primary service   Hinds Catheter: Not present  Code Status: Full Code  Disposition Plan    Expected discharge: Per Zack.        GA Beth  [facility]  Securely message with the Vocera Web Console (learn more here)  Text page via [employer] Paging/Directory    ______________________________________________________________________

## 2021-07-21 NOTE — ANESTHESIA PREPROCEDURE EVALUATION
Anesthesia Pre-Procedure Evaluation    Patient: Inés Griggs   MRN: [id number] : 1950        Preoperative Diagnosis: Left knee DJD [M17.12]   Procedure : Procedure(s):  left total knee arthroplasty     Past Medical History:   Diagnosis Date     Anxiety      Arthritis      Hyperlipidemia      Hypertension      Impaired fasting glucose      Osteopenia       Past Surgical History:   Procedure Laterality Date     ARTHROPLASTY HIP ANTERIOR Right 2020    Procedure: RIGHT TOTAL HIP ARTHROPLASTY DIRECT ANTERIOR APPROACH;  Surgeon: Usama Christianson MD;  Location: [facility] OR      SECTION       [facility] AFF SURGICAL PATHOLOGY      benign     ORTHOPEDIC SURGERY Right     RT      Allergies   Allergen Reactions     Zetia [Ezetimibe] Muscle Pain (Myalgia)      Social History     Tobacco Use     Smoking status: Former Smoker     Smokeless tobacco: Never Used   Substance Use Topics     Alcohol use: Yes     Comment: minimal      Wt Readings from Last 1 Encounters:   20 80.3 kg (177 lb)        Anesthesia Evaluation   Pt has had prior anesthetic.     No history of anesthetic complications       ROS/MED HX  ENT/Pulmonary:     (+) tobacco use, Past use,  (-) sleep apnea   Neurologic:       Cardiovascular:     (+) Dyslipidemia hypertension-----    METS/Exercise Tolerance:     Hematologic:       Musculoskeletal:   (+) arthritis,     GI/Hepatic:  - neg GI/hepatic ROS  (-) GERD   Renal/Genitourinary:  - neg Renal ROS     Endo:  - neg endo ROS     Psychiatric/Substance Use:     (+) psychiatric history anxiety     Infectious Disease:       Malignancy:       Other:            Physical Exam    Airway        Mallampati: II   TM distance: > 3 FB   Neck ROM: full   Mouth opening: > 3 cm    Respiratory Devices and Support         Dental  no notable dental history         Cardiovascular   cardiovascular exam normal          Pulmonary   pulmonary exam normal                OUTSIDE LABS:  CBC:   Lab Results   Component Value  Date    HGB 11.2 (L) 05/28/2020    HGB 13.8 05/27/2020     BMP:   Lab Results   Component Value Date     05/28/2020    POTASSIUM 3.5 05/28/2020    POTASSIUM 3.7 05/27/2020    CHLORIDE 111 (H) 05/28/2020    CO2 23 05/28/2020    BUN 15 05/28/2020    CR 0.64 05/28/2020    CR 0.65 05/27/2020     (H) 05/28/2020     COAGS: No results found for: PTT, INR, FIBR  POC: No results found for: BGM, HCG, HCGS  HEPATIC: No results found for: ALBUMIN, PROTTOTAL, ALT, AST, GGT, ALKPHOS, BILITOTAL, BILIDIRECT, SHARON  OTHER:   Lab Results   Component Value Date    NADJA 8.3 (L) 05/28/2020       Anesthesia Plan    ASA Status:  2   NPO Status:  NPO Appropriate    Anesthesia Type: Spinal.              Consents    Anesthesia Plan(s) and associated risks, benefits, and realistic alternatives discussed. Questions answered and patient/representative(s) expressed understanding.     - Discussed with:  Patient         Postoperative Care    Pain management: Multi-modal analgesia, Peripheral nerve block (Continuous).   PONV prophylaxis: Ondansetron (or other 5HT-3)     Comments:                Barbara Infante MD

## 2021-07-21 NOTE — ANESTHESIA POSTPROCEDURE EVALUATION
Patient: Inés Griggs    Procedure(s):  left total knee arthroplasty    Diagnosis:Left knee DJD [M17.12]  Diagnosis Additional Information: No value filed.    Anesthesia Type:  Spinal    Note:  Disposition: Inpatient   Postop Pain Control: Uneventful            Sign Out: Well controlled pain   PONV: No   Neuro/Psych: Uneventful            Sign Out: Acceptable/Baseline neuro status   Airway/Respiratory: Uneventful            Sign Out: Acceptable/Baseline resp. status   CV/Hemodynamics: Uneventful            Sign Out: Acceptable CV status; No obvious hypovolemia; No obvious fluid overload   Other NRE: NONE   DID A NON-ROUTINE EVENT OCCUR? No           Last vitals:  Vitals Value Taken Time   /55 07/21/21 1540   Temp 36  C (96.8  F) 07/21/21 1500   Pulse 65 07/21/21 1547   Resp 11 07/21/21 1547   SpO2 97 % 07/21/21 1547   Vitals shown include unvalidated device data.    Electronically Signed By: Mak Conrad MD  July 21, 2021  3:48 PM

## 2021-07-21 NOTE — PROGRESS NOTES
"   07/21/21 1600   Quick Adds   Type of Visit Initial PT Evaluation       Present no   Living Environment   People in home alone   Current Living Arrangements house   Home Accessibility stairs to enter home   Number of Stairs, Main Entrance 4   Stair Railings, Main Entrance railing on right side (ascending)   Transportation Anticipated family or friend will provide   Living Environment Comments Pt lives alone in a house. Pt reports stairs to enter, but once inside, the bedroom, bathroom, and kitchen are all on the main floor. Pt reports her son will bring her home upon discharge and will provide 24/7 assist as needed.   Self-Care   Usual Activity Tolerance good   Current Activity Tolerance moderate   Regular Exercise No   Equipment Currently Used at Home none   Activity/Exercise/Self-Care Comment Pt reports being IND at baseline with all ADLs including cooking, cleaning, bathing and dressing. Pt reports ambulating within the household and community without an AD. Pt has access to a FWW if needed. Pt reports stairs are difficult for her. Pt reports being ambulate ~3 blocks before needing a rest break. Pt reports she still drives and does errands IND.   Disability/Function   Hearing Difficulty or Deaf no   Wear Glasses or Blind yes   Vision Management glasses   Concentrating, Remembering or Making Decisions Difficulty no   Difficulty Communicating no   Walking or Climbing Stairs Difficulty yes   Walking or Climbing Stairs ambulation difficulty, requires equipment;stair climbing difficulty, requires equipment   Doing Errands Independently Difficulty (such as shopping) no   Fall history within last six months no   Change in Functional Status Since Onset of Current Illness/Injury no   General Information   Onset of Illness/Injury or Date of Surgery 07/21/21   Referring Physician Usama Christianson MD   Patient/Family Therapy Goals Statement (PT) \"To get stronger and to go home\"   Pertinent History of " Current Problem (include personal factors and/or comorbidities that impact the POC) s/p L TKA POD#0   Existing Precautions/Restrictions fall   Weight-Bearing Status - LLE weight-bearing as tolerated   Weight-Bearing Status - RLE full weight-bearing   Cognition   Orientation Status (Cognition) oriented x 3   Pain Assessment   Patient Currently in Pain Yes, see Vital Sign flowsheet  (2/10)   Integumentary/Edema   Integumentary/Edema Comments Incision on L knee with ace wrap intact   Posture    Posture Forward head position;Protracted shoulders   Range of Motion (ROM)   ROM Quick Adds ROM deficits secondary to surgical procedure;ROM deficits secondary to pain;ROM deficits secondary to swelling;ROM deficits secondary to weakness   ROM Comment R LE ROM WFL   Strength   Manual Muscle Testing Quick Adds Able to perform R SLR;Able to perform L SLR   Bed Mobility   Comment (Bed Mobility) Performed supine>sit w/ CGA   Transfers   Transfer Safety Comments Performed sit>stand w/ FWW and CGA   Gait/Stairs (Locomotion)   [place] Level (Gait) contact guard   Assistive Device (Gait) walker, front-wheeled   Distance in Feet (Required for LE Total Joints) 150'  (10' eval)   Comment (Gait/Stairs) Pt ambulated ~150' w/ FWW and CGA. Pt ambulated with decreased gait speed, downward gaze, heavy use of BUEs on FWW, and decreased step length. Verbal cues given for upright gaze and posture, to increase step length, and to reduce force through BUEs on FWW for improved WBing in L LE. Pt was steady throughout ambulation and had no LOB.   Balance   Balance Comments Pt able to sit at EOB unsupported without LOB. Pt ambulates using a FWW for added stability and support.   Sensory Examination   Sensory Perception patient reports no sensory changes   Clinical Impression   Criteria for Skilled Therapeutic Intervention yes, treatment indicated   PT Diagnosis (PT) Impaired gait   Influenced by the following impairments Increased pain; decreased  activity tolerance; decreased balance; decreased strength   Functional limitations due to impairments Impaired functional mobility   Clinical Presentation Stable/Uncomplicated   Clinical Presentation Rationale Clinical Judgement   Clinical Decision Making (Complexity) low complexity   Therapy Frequency (PT) Daily   Predicted Duration of Therapy Intervention (days/wks) 5 days   Planned Therapy Interventions (PT) balance training;bed mobility training;cryotherapy;gait training;home exercise program;patient/family education;stair training;ROM (range of motion);strengthening;stretching;transfer training   Risk & Benefits of therapy have been explained evaluation/treatment results reviewed;care plan/treatment goals reviewed;risks/benefits reviewed;current/potential barriers reviewed;participants voiced agreement with care plan;participants included;patient   PT Discharge Planning    PT Rationale for DC Rec Anticipate pt will be SBA for bed mobility, functional transfers w/ FWW, gait w/ FWW, and min A x1 for stairs   PT Brief overview of current status  Supine>sit w/ CGA, sit>stand w/ FWW and CGA, gait w/ FWW and CGA   Total Evaluation Time   Total Evaluation Time (Minutes) 10

## 2021-07-21 NOTE — ANESTHESIA PROCEDURE NOTES
Intrathecal injection Procedure Note  Pre-Procedure   Staff -        Anesthesiologist:  Barbara Infante MD       Performed By: anesthesiologist       Location: OR       Pre-Anesthestic Checklist: patient identified, IV checked, site marked, risks and benefits discussed, informed consent, monitors and equipment checked, pre-op evaluation, at physician/surgeon's request and post-op pain management  Timeout:       Correct Patient: Yes        Correct Procedure: Yes        Correct Site: Yes        Correct Position: Yes   Procedure Documentation  Procedure: intrathecal injection       Patient Position: sitting       Patient Prep/Sterile Barriers: sterile gloves, mask, patient draped       Skin prep: Betadine       Insertion Site: L3-4. (midline approach).       Spinal Needle Type: Priscilla-Joel       Introducer used       Introducer: 20 G       # of attempts: 1 and  # of redirects:  0    Assessment/Narrative         Paresthesias: No.       CSF fluid: clear.    Medication(s) Administered   0.75% Hyperbaric Bupivacaine (Intrathecal), 1.5 mL  Medication Administration Time: 7/21/2021 11:40 AM    Comments:  12.5 mg 0.75% BUPIVACAINE WITH 8.25% DEXTROSE INJECTED. No paresthesia on injection. Patient tolerated the procedure well.

## 2021-07-21 NOTE — ANESTHESIA CARE TRANSFER NOTE
Patient: Inés Griggs    Procedure(s):  left total knee arthroplasty    Diagnosis: Left knee DJD [M17.12]  Diagnosis Additional Information: No value filed.    Anesthesia Type:   Spinal     Note:    Oropharynx: oropharynx clear of all foreign objects and spontaneously breathing  Level of Consciousness: awake  Oxygen Supplementation: face mask  Level of Supplemental Oxygen (L/min / FiO2): 6  Independent Airway: airway patency satisfactory and stable  Dentition: dentition unchanged  Vital Signs Stable: post-procedure vital signs reviewed and stable  Report to RN Given: handoff report given  Patient transferred to: PACU    Handoff Report: Identifed the Patient, Identified the Reponsible Provider, Reviewed the pertinent medical history, Discussed the surgical course, Reviewed Intra-OP anesthesia mangement and issues during anesthesia, Set expectations for post-procedure period and Allowed opportunity for questions and acknowledgement of understanding      Vitals:  Vitals Value Taken Time   /50 07/21/21 1416   Temp     Pulse 72 07/21/21 1418   Resp 14 07/21/21 1418   SpO2 98 % 07/21/21 1418   Vitals shown include unvalidated device data.    Electronically Signed By: PORTILLO Anne CRNA  July 21, 2021  2:19 PM

## 2021-07-22 ENCOUNTER — APPOINTMENT (OUTPATIENT)
Dept: OCCUPATIONAL THERAPY | Facility: CLINIC | Age: 71
End: 2021-07-22
Attending: ORTHOPAEDIC SURGERY
Payer: COMMERCIAL

## 2021-07-22 ENCOUNTER — APPOINTMENT (OUTPATIENT)
Dept: PHYSICAL THERAPY | Facility: CLINIC | Age: 71
End: 2021-07-22
Attending: ORTHOPAEDIC SURGERY
Payer: COMMERCIAL

## 2021-07-22 VITALS
HEART RATE: 65 BPM | HEIGHT: 65 IN | RESPIRATION RATE: 16 BRPM | SYSTOLIC BLOOD PRESSURE: 147 MMHG | BODY MASS INDEX: 28.89 KG/M2 | OXYGEN SATURATION: 97 % | WEIGHT: 173.4 LBS | DIASTOLIC BLOOD PRESSURE: 80 MMHG | TEMPERATURE: 98 F

## 2021-07-22 LAB — HGB BLD-MCNC: 10.7 G/DL (ref 11.7–15.7)

## 2021-07-22 PROCEDURE — 97116 GAIT TRAINING THERAPY: CPT | Mod: GP | Performed by: PHYSICAL THERAPIST

## 2021-07-22 PROCEDURE — 97165 OT EVAL LOW COMPLEX 30 MIN: CPT | Mod: GO | Performed by: OCCUPATIONAL THERAPIST

## 2021-07-22 PROCEDURE — 36415 COLL VENOUS BLD VENIPUNCTURE: CPT | Performed by: ORTHOPAEDIC SURGERY

## 2021-07-22 PROCEDURE — 250N000013 HC RX MED GY IP 250 OP 250 PS 637: Performed by: PHYSICIAN ASSISTANT

## 2021-07-22 PROCEDURE — 258N000003 HC RX IP 258 OP 636: Performed by: ORTHOPAEDIC SURGERY

## 2021-07-22 PROCEDURE — 97530 THERAPEUTIC ACTIVITIES: CPT | Mod: GP | Performed by: PHYSICAL THERAPIST

## 2021-07-22 PROCEDURE — 85018 HEMOGLOBIN: CPT | Performed by: ORTHOPAEDIC SURGERY

## 2021-07-22 PROCEDURE — 97535 SELF CARE MNGMENT TRAINING: CPT | Mod: GO | Performed by: OCCUPATIONAL THERAPIST

## 2021-07-22 PROCEDURE — 250N000013 HC RX MED GY IP 250 OP 250 PS 637: Performed by: ORTHOPAEDIC SURGERY

## 2021-07-22 PROCEDURE — 250N000011 HC RX IP 250 OP 636: Performed by: ORTHOPAEDIC SURGERY

## 2021-07-22 RX ORDER — OXYCODONE HYDROCHLORIDE 5 MG/1
5-10 TABLET ORAL EVERY 4 HOURS PRN
Qty: 40 TABLET | Refills: 0 | Status: SHIPPED | OUTPATIENT
Start: 2021-07-22 | End: 2024-03-08

## 2021-07-22 RX ADMIN — OXYCODONE HYDROCHLORIDE 5 MG: 5 TABLET ORAL at 10:49

## 2021-07-22 RX ADMIN — ACETAMINOPHEN 975 MG: 325 TABLET, FILM COATED ORAL at 00:00

## 2021-07-22 RX ADMIN — KETOROLAC TROMETHAMINE 15 MG: 15 INJECTION, SOLUTION INTRAMUSCULAR; INTRAVENOUS at 04:06

## 2021-07-22 RX ADMIN — SENNOSIDES AND DOCUSATE SODIUM 1 TABLET: 8.6; 5 TABLET ORAL at 08:53

## 2021-07-22 RX ADMIN — CEFAZOLIN 1 G: 1 INJECTION, POWDER, FOR SOLUTION INTRAMUSCULAR; INTRAVENOUS at 04:06

## 2021-07-22 RX ADMIN — OXYCODONE HYDROCHLORIDE 5 MG: 5 TABLET ORAL at 01:35

## 2021-07-22 RX ADMIN — POLYETHYLENE GLYCOL 3350 17 G: 17 POWDER, FOR SOLUTION ORAL at 08:52

## 2021-07-22 RX ADMIN — SERTRALINE HYDROCHLORIDE 50 MG: 50 TABLET ORAL at 08:54

## 2021-07-22 RX ADMIN — ACETAMINOPHEN 975 MG: 325 TABLET, FILM COATED ORAL at 08:54

## 2021-07-22 RX ADMIN — KETOROLAC TROMETHAMINE 15 MG: 15 INJECTION, SOLUTION INTRAMUSCULAR; INTRAVENOUS at 10:36

## 2021-07-22 RX ADMIN — SODIUM CHLORIDE, POTASSIUM CHLORIDE, SODIUM LACTATE AND CALCIUM CHLORIDE: 600; 310; 30; 20 INJECTION, SOLUTION INTRAVENOUS at 01:35

## 2021-07-22 RX ADMIN — ASPIRIN 325 MG: 325 TABLET, COATED ORAL at 08:53

## 2021-07-22 RX ADMIN — LOSARTAN POTASSIUM AND HYDROCHLOROTHIAZIDE 1 TABLET: 100; 25 TABLET, FILM COATED ORAL at 08:53

## 2021-07-22 ASSESSMENT — ACTIVITIES OF DAILY LIVING (ADL): PREVIOUS_RESPONSIBILITIES: MEAL PREP;HOUSEKEEPING;LAUNDRY;SHOPPING;YARDWORK;MEDICATION MANAGEMENT;FINANCES;DRIVING

## 2021-07-22 NOTE — PLAN OF CARE
[facility]      OUTPATIENT OCCUPATIONAL THERAPY  EVALUATION  PLAN OF TREATMENT FOR OUTPATIENT REHABILITATION  (COMPLETE FOR INITIAL CLAIMS ONLY)  Patient's Last Name, First Name, M.I.  YOB: 1950  Inés Griggs                          Provider's Name  [facility] Medical Record No.  [id number]                               Onset Date:  07/21/21   Start of Care Date:        Type:     ___PT   _X_OT   ___SLP Medical Diagnosis:                           OT Diagnosis:  Impaired independence with I/ADLs   Visits from SOC:  1   _________________________________________________________________________________  Plan of Treatment/Functional Goals    Planned Interventions: ADL retraining, bed mobility training, transfer training   Goals: See Occupational Therapy Goals on Care Plan in [facility] electronic health record.    Therapy Frequency: 1x eval and treat  Predicted Duration of Therapy Intervention:    _________________________________________________________________________________    I CERTIFY THE NEED FOR THESE SERVICES FURNISHED UNDER        THIS PLAN OF TREATMENT AND WHILE UNDER MY CARE     (Physician co-signature of this document indicates review and certification of the therapy plan).                 ,      Referring Physician: Usama Christianson MD            Initial Assessment        See Occupational Therapy evaluation dated   in Epic electronic health record.

## 2021-07-22 NOTE — PLAN OF CARE
POx1 for L total knee. Up A1 GB&W. A&Ox4. VSS 1.5L oxymask. CMS intact. Voiding with AUO in BR. Tolerating reg diet. Pain managed with oxycodone 5mg, and scheduled toradol and tylenol. Plan for discharge to home today.

## 2021-07-22 NOTE — PLAN OF CARE
Pt a/o up walking padron Medicated with med for pain with relief. Went over discharge information dced with son meds and paperwork.

## 2021-07-22 NOTE — PLAN OF CARE
[facility]      OUTPATIENT PHYSICAL THERAPY EVALUATION  PLAN OF TREATMENT FOR OUTPATIENT REHABILITATION  (COMPLETE FOR INITIAL CLAIMS ONLY)  Patient's Last Name, First Name, M.I.  YOB: 1950  Inés Griggs                        Provider's Name  [facility] Medical Record No.  [id number]                               Onset Date:  07/21/21   Start of Care Date:         Type:     _X_PT   ___OT   ___SLP Medical Diagnosis:                           PT Diagnosis:  Impaired gait   Visits from SOC:  1   _________________________________________________________________________________  Plan of Treatment/Functional Goals    Planned Interventions: balance training, bed mobility training, cryotherapy, gait training, home exercise program, patient/family education, stair training, ROM (range of motion), strengthening, stretching, transfer training     Goals: See Physical Therapy Goals on Care Plan in [facility] electronic health record.    Therapy Frequency: Daily  Predicted Duration of Therapy Intervention: 5 days  _________________________________________________________________________________    I CERTIFY THE NEED FOR THESE SERVICES FURNISHED UNDER        THIS PLAN OF TREATMENT AND WHILE UNDER MY CARE     (Physician co-signature of this document indicates review and certification of the therapy plan).               ,      Referring Physician: Usama Christianson MD            Initial Assessment        See Physical Therapy evaluation dated   in Epic electronic health record.

## 2021-07-22 NOTE — PROGRESS NOTES
A/O x4. CMS intact. Dressing CDI. Up assist of 1 to BR. Voiding. Oxycodone 5 mg given for pain. IVF infusing. 2 L nasal cannula. Discharge home tomorrow.

## 2021-07-22 NOTE — PLAN OF CARE
Physical Therapy Discharge Summary    Reason for therapy discharge:    Discharged to home.    Progress towards therapy goal(s). See goals on Care Plan in [facility] electronic health record for goal details.  Goals met    Therapy recommendation(s):    Continue home exercise program.

## 2021-07-22 NOTE — PROGRESS NOTES
07/22/21 0800   Quick Adds   Type of Visit Initial Occupational Therapy Evaluation   Living Environment   People in home alone   Current Living Arrangements house   Home Accessibility stairs to enter home   Number of Stairs, Main Entrance 4   Stair Railings, Main Entrance railing on right side (ascending)   Transportation Anticipated family or friend will provide   Living Environment Comments Pt lives alone in a house, stairs to enter but all needs met on main level except for laundry (basement). Pt states her son, daughter and granddaughter will be staying with her alternately for the first week at home. Tub shower, no grab bars. Regular toilet, sink vanity on L side   Self-Care   Usual Activity Tolerance good   Current Activity Tolerance moderate   Regular Exercise No   Equipment Currently Used at Home none  (pt has FWW, cane, shower chair, reacher, sock aid, RTS)   Activity/Exercise/Self-Care Comment Pt reports IND at baseline with all I/ADLs, was driving. Pt was ambulating without an assistive device. Pt has several pieces of adaptive equipment at home from prior surgeries, see above   Instrumental Activities of Daily Living (IADL)   Previous Responsibilities meal prep;housekeeping;laundry;shopping;yardwork;medication management;finances;driving   Disability/Function   Hearing Difficulty or Deaf no   Wear Glasses or Blind yes   Vision Management glasses   Concentrating, Remembering or Making Decisions Difficulty no   Difficulty Communicating no   Difficulty Eating/Swallowing no   Walking or Climbing Stairs Difficulty yes   Walking or Climbing Stairs ambulation difficulty, requires equipment;stair climbing difficulty, requires equipment   Dressing/Bathing Difficulty no   Toileting issues no   Doing Errands Independently Difficulty (such as shopping) no   Fall history within last six months no   General Information   Onset of Illness/Injury or Date of Surgery 07/21/21   Referring Physician Usama Christianson MD    Additional Occupational Profile Info/Pertinent History of Current Problem Pt is POD #1 L TKA   Existing Precautions/Restrictions fall   Left Lower Extremity (Weight-bearing Status) weight-bearing as tolerated (WBAT)   General Observations and Info Pt seated in bed on arrival, VSS stable throughout session, pt receiving IV fluids   Cognitive Status Examination   Orientation Status orientation to person, place and time   Affect/Mental Status (Cognitive) WFL   Follows Commands WFL   Visual Perception   Visual Impairment/Limitations WFL   Sensory   Sensory Quick Adds No deficits were identified   Pain Assessment   Patient Currently in Pain Yes, see Vital Sign flowsheet  (3/10)   Integumentary/Edema   Integumentary/Edema Comments Ace wrap to L LE s/p TKA   Posture   Posture not impaired   Range of Motion Comprehensive   General Range of Motion bilateral upper extremity ROM WNL   Comment, General Range of Motion L LE limited s/p TKA   Strength Comprehensive (MMT)   Comment, General Manual Muscle Testing (MMT) Assessment L LE limited s/p TKA, pt ambulating with FWW   Muscle Tone Assessment   Muscle Tone Quick Adds No deficits were identified   Coordination   Upper Extremity Coordination No deficits were identified   Bed Mobility   Bed Mobility supine-sit;sit-supine   Supine-Sit [place] (Bed Mobility) supervision   Sit-Supine [place] (Bed Mobility) supervision   Assistive Device (Bed Mobility) bed rails   Transfers   Transfers sit-stand transfer;toilet transfer   Sit-Stand Transfer   Sit-Stand [place] (Transfers) supervision   Assistive Device (Sit-Stand Transfers) walker, front-wheeled   Toilet Transfer   Type (Toilet Transfer) sit-stand;stand-sit   [place] Level (Toilet Transfer) supervision   Assistive Device (Toilet Transfer) walker, front-wheeled;grab bars/safety frame   Activities of Daily Living   BADL Assessment upper body dressing;lower body dressing;toileting;bathing   Bathing Assessment    Assistive Devices (Bathing) shower chair   Comment (Bathing) Not formally assessed, pt states she has been practicing. Recommend assist of daughter with transfer   Upper Body Dressing Assessment   [place] Level (Upper Body Dressing) modified independence   Lower Body Dressing Assessment   [place] Level (Lower Body Dressing) minimum assist (75% patient effort);verbal cues   Position (Lower Body Dressing) edge of bed sitting   Toileting   [place] Level (Toileting) supervision   Assistive Devices (Toileting) grab bar, toilet   Clinical Impression   Criteria for Skilled Therapeutic Interventions Met (OT) yes;meets criteria;skilled treatment is necessary   OT Diagnosis Impaired independence with I/ADLs   OT Problem List-Impairments impacting ADL activity tolerance impaired;balance;mobility;range of motion (ROM);strength;pain;post-surgical precautions   Assessment of Occupational Performance 3-5 Performance Deficits   Identified Performance Deficits dressing, bathing, toileting, household tasks   Planned Therapy Interventions (OT) ADL retraining;bed mobility training;transfer training   Clinical Decision Making Complexity (OT) low complexity   Therapy Frequency (OT) 1x eval and treat   Anticipated Equipment Needs Upon Discharge (OT)   (pt has necessary equipment)   Risk & Benefits of therapy have been explained evaluation/treatment results reviewed;care plan/treatment goals reviewed;risks/benefits reviewed;current/potential barriers reviewed;participants voiced agreement with care plan;participants included;patient   OT Discharge Planning    OT Rationale for DC Rec Pt mobilizing with overall SBA, has extensive adaptive equipment for ADLs at home. Pt reports her son, daughter, and granddaughter will be staying with her over the next week. Recommend assist with transfer into tub shower   Total Evaluation Time (Minutes)   Total Evaluation Time (Minutes) 10

## 2021-07-25 NOTE — OP NOTE
Operative Note    Inés Griggs MRN# [id number]   YOB: 1950 Age: 71 year old   Date of Procedure: 7/21/2021    1st Assistant:  Lucian Faulkner PA-C      PREOPERATIVE DIAGNOSIS: Left knee osteoarthritis, failure to respond to conservative management.     POSTOPERATIVE DIAGNOSIS: Left knee osteoarthritis, failure to respond to conservative management.     PROCEDURE: Left total knee arthroplasty, [employer] Triathalon components, posterior stabilized.  Tourniquet-less technique.     DESCRIPTION OF PROCEDURE: Inés Griggs was brought to the operating room. After satisfactory anesthesia, the left lower extremity was prepped and draped in the usual sterile fashion. The patient received 1 gram of Ancef preoperatively.     Straight anterior incision was made. Dissection was carried down to the extensor mechanism. Medial arthrotomy was made.  Advanced osteoarthritis was noted. Patella was exposed, measured and resected to accept a size 32mm, asymmetric patella. The knee was then flexed up. Intramedullary guide was placed at 5 degrees as per the preoperative plan. The distal femoral cut was made followed by anteroposterior cuts and chamfer cuts. Box cut was made for the femoral component as well. Femur sized to be a size 3. Attention was then directed to the tibia. Tibial cut was made perpendicular to the long axis of the tibia in both AP and lateral planes, 3 degree posterior slope. The tibia sized to be a size 2. Soft tissue balancing was performed. Trial reduction was performed and with a 10-mm PS insert, there was excellent soft tissue balancing, patellar tracking, alignment as well as motion. Trial components were removed. Tibial baseplate was prepared for size 2 baseplate. All 3 components were cemented in place without difficulty. Excess cement was removed.  A three minute betadine soak was performed.  The wound was thoroughly irrigated and tissues were infiltrated with  toradol/marcaine mixture.  The real 10-mm PS insert was impacted in place.  One gram of vancomycin powder was placed deep and superficial prior to closure. The extensor mechanism was closed in sequential layers including a running number 1 Stratafix, then augmented with interrupted 0 Vicryl and 0 ethibond suture. The skin was closed with interrupted 2-0 Vicryl subcutaneously, then a running 2-0 Stratafix, followed by a subcuticular 3-0 monocryl.  A mesh dressing with skin glue was applied. A sterile dressing was applied. The patient left the operating room in satisfactory condition. Patient received 1 gm of tranexamic acid pre-op and at closure.  A skilled first assistant was necessary for this procedure for assistance with patient positioning, prepping, draping, surgical visualization, performance of the repair, wound closure, and application of the dressing.  The assistant was present for the entire procedure.       MD JANIS Rodriguez MD

## 2021-10-31 ENCOUNTER — HEALTH MAINTENANCE LETTER (OUTPATIENT)
Age: 71
End: 2021-10-31

## 2021-12-10 ENCOUNTER — HOSPITAL ENCOUNTER (OUTPATIENT)
Dept: MAMMOGRAPHY | Facility: CLINIC | Age: 71
Discharge: HOME | End: 2021-12-10
Attending: FAMILY MEDICINE | Admitting: FAMILY MEDICINE
Payer: COMMERCIAL

## 2021-12-10 DIAGNOSIS — Z12.31 VISIT FOR SCREENING MAMMOGRAM: ICD-10-CM

## 2021-12-10 PROCEDURE — 77063 BREAST TOMOSYNTHESIS BI: CPT

## 2022-07-15 ENCOUNTER — TRANSFERRED RECORDS (OUTPATIENT)
Dept: HEALTH INFORMATION MANAGEMENT | Facility: CLINIC | Age: 72
End: 2022-07-15

## 2022-07-21 ENCOUNTER — OFFICE VISIT (OUTPATIENT)
Dept: CARDIOLOGY | Facility: CLINIC | Age: 72
End: 2022-07-21
Payer: COMMERCIAL

## 2022-07-21 VITALS
SYSTOLIC BLOOD PRESSURE: 129 MMHG | HEART RATE: 66 BPM | DIASTOLIC BLOOD PRESSURE: 77 MMHG | OXYGEN SATURATION: 100 % | BODY MASS INDEX: 28.51 KG/M2 | HEIGHT: 65 IN | WEIGHT: 171.1 LBS

## 2022-07-21 DIAGNOSIS — R07.89 ATYPICAL CHEST PAIN: Primary | ICD-10-CM

## 2022-07-21 PROCEDURE — 99203 OFFICE O/P NEW LOW 30 MIN: CPT | Performed by: INTERNAL MEDICINE

## 2022-07-21 RX ORDER — ANTIOX #8/OM3/DHA/EPA/LUT/ZEAX 250-2.5 MG
CAPSULE ORAL 2 TIMES DAILY
COMMUNITY

## 2022-07-21 RX ORDER — AMLODIPINE BESYLATE AND BENAZEPRIL HYDROCHLORIDE 2.5; 1 MG/1; MG/1
1 CAPSULE ORAL DAILY
COMMUNITY
Start: 2022-07-15 | End: 2024-03-08

## 2022-07-21 NOTE — PROGRESS NOTES
HPI and Plan:   Today I had the pleasure of seeing Inés Griggs at [facility]. She is a pleasant 72 year old patient with a past medical history of hypertension who presents to the clinic in initial consultation.  The patient is noted progressive fatigue and exertional dyspnea.  She exercises on a regular basis and tells me that for the last some time she is noted difficulty in keeping up with her peers.  Reports some 'uneasiness' in the chest on occasion.  Denies palpitation.    She also reports being stressed due to a lot of things going on in her family.  Her brother in law recently  of a massive heart attack.  Her sister was recently told that she has blockage in one of the arteries.  Her elder brother recently had a fall related to syncopal episode and sustained cervical vertebrae fracture.  As far as family history is concerned her mother  from aortic dissection, father  from heart attack in his 80s.  The patient is not a smoker.  She tells me that she is extremely worried about the possibility of having block coronary artery and having the same fate as her other family members.  She had a stress echocardiogram in 2018 which was essentially normal.  An echocardiogram in 2016 was also normal.  I reviewed these tests independently today.  Her LDL is 150 mg/dL.  She takes 10 mg of Crestor 3 times a week.    As far as hypertension is concerned she manually checks her blood pressure at home and finds it to be somewhat elevated on a regular basis.  However she was recently started on Norvasc/benazepril which has improved her blood pressure.    Assessment and plan  1.  Exercise intolerance/chest tightness  2.  Essential hypertension  3.  Anxiety    I discussed with the patient that we can perform a CT coronary angiogram for definitive rule out of coronary artery disease.  The patient would like to get this done.  We discussed that risks and benefits of the procedure including  the risks of using IV contrast.  She understands and is willing to undergo the procedure.  For now, I will continue her on all the current medications she is on.  However, I would recommend increasing the frequency of Crestor gradually with an goal of taking it daily.  I will repeat a fasting lipid profile in 6 months.  I will reach out to her with the results of the test and make a follow-up appointment as needed.    Recommendations  1.  CT coronary angiogram  2.   try to take Crestor on a daily basis.  3.  Fasting lipid profile and ALT in 6 months.    Thank you for allowing me to participate in the care of Inés Griggs    This note was completed in part using Dragon voice recognition software. Although reviewed after completion, some word and grammatical errors may occur.    Anthony Moy MD  Cardiology    No orders of the defined types were placed in this encounter.      Orders Placed This Encounter   Medications     aspirin (ASPIRIN) 81 MG EC tablet     Sig: Take 81 mg by mouth daily     cholecalciferol (VITAMIN D3) 125 mcg (5000 units) capsule     amLODIPine-benazepril (LOTREL) 2.5-10 MG capsule     Si capsule daily     Multiple Vitamins-Minerals (PRESERVISION AREDS 2) CAPS     Sig: Take by mouth 2 times daily       Medications Discontinued During This Encounter   Medication Reason     aspirin (ASA) 325 MG EC tablet Dose adjustment       Encounter Diagnosis   Name Primary?     Atypical chest pain Yes       CURRENT MEDICATIONS:  Current Outpatient Medications   Medication Sig Dispense Refill     amLODIPine-benazepril (LOTREL) 2.5-10 MG capsule 1 capsule daily       aspirin (ASPIRIN) 81 MG EC tablet Take 81 mg by mouth daily       cholecalciferol (VITAMIN D3) 125 mcg (5000 units) capsule        Coenzyme Q10 (COQ-10 PO) Take 1 tablet by mouth every other day       diphenhydrAMINE (BENADRYL) 25 MG tablet Take 25 mg by mouth nightly as needed for itching or allergies       Glucosamine HCl (GLUCOSAMINE PO)  Take 1 tablet by mouth daily as needed       losartan-hydrochlorothiazide (HYZAAR) 100-25 MG tablet Take 1 tablet by mouth every morning       Multiple Vitamins-Minerals (PRESERVISION AREDS 2) CAPS Take by mouth 2 times daily       Omega-3 Fatty Acids (FISH OIL PO) Take 2 capsules by mouth daily       rosuvastatin (CRESTOR) 10 MG tablet Take 10 mg by mouth three times a week (Patient taking differently than prescribed due to muscle pain: RX states take 10mg daily)       sertraline (ZOLOFT) 100 MG tablet Take 50 mg by mouth daily (takes 0.5 x 100mg)       acetaminophen (TYLENOL) 325 MG tablet Take 3 tablets (975 mg) by mouth 4 times daily as needed for other (mild pain) (Patient not taking: Reported on 7/21/2022) 100 tablet 0     calcium carbonate (TUMS) 500 MG chewable tablet Take 1 chew tab by mouth 2 times daily as needed for heartburn (Patient not taking: Reported on 7/21/2022)       celecoxib (CELEBREX) 200 MG capsule Take 1 capsule (200 mg) by mouth daily Do not take within 6 hours of ibuprofen (MOTRIN, ADVIL) or ketorolac (TORADOL) if prescribed. (Patient not taking: Reported on 7/21/2022) 30 capsule 0     multivitamin w/minerals (THERA-VIT-M) tablet Take 1 tablet by mouth daily (Patient not taking: Reported on 7/21/2022)       oxyCODONE (ROXICODONE) 5 MG tablet Take 1-2 tablets (5-10 mg) by mouth every 4 hours as needed for pain (Patient not taking: Reported on 7/21/2022) 40 tablet 0     senna-docusate (SENOKOT-S/PERICOLACE) 8.6-50 MG tablet Take 1-2 tablets by mouth 2 times daily Take while on oral narcotics to prevent or treat constipation. (Patient not taking: Reported on 7/21/2022) 30 tablet 0       ALLERGIES     Allergies   Allergen Reactions     Zetia [Ezetimibe] Muscle Pain (Myalgia)       PAST MEDICAL HISTORY:  Past Medical History:   Diagnosis Date     Anxiety      Arthritis      Hyperlipidemia      Hypertension      Impaired fasting glucose      Osteopenia        PAST SURGICAL HISTORY:  Past  "Surgical History:   Procedure Laterality Date     ARTHROPLASTY HIP ANTERIOR Right 2020    Procedure: RIGHT TOTAL HIP ARTHROPLASTY DIRECT ANTERIOR APPROACH;  Surgeon: Usama Christianson MD;  Location: [facility] OR     ARTHROPLASTY KNEE Left 2021    Procedure: left total knee arthroplasty;  Surgeon: Usama Christianson MD;  Location: [facility] OR     [facility] AFF SURGICAL PATHOLOGY      benign     ENT SURGERY      parotid ectomy     GYN SURGERY      c section     ORTHOPEDIC SURGERY Right     RT       FAMILY HISTORY:  Family History   Problem Relation Age of Onset     Hypertension Mother      Aneurysm Mother      Myocardial Infarction Father      Atrial fibrillation Sister        SOCIAL HISTORY:  Social History     Socioeconomic History     Marital status:      Spouse name: None     Number of children: None     Years of education: None     Highest education level: None   Tobacco Use     Smoking status: Former Smoker     Packs/day: 0.75     Years: 15.00     Pack years: 11.25     Types: Cigarettes     Start date:      Quit date:      Years since quittin.5     Smokeless tobacco: Never Used   Vaping Use     Vaping Use: Never used   Substance and Sexual Activity     Alcohol use: Yes     Comment: minimal     Drug use: Not Currently       Review of Systems:  Skin:  not assessed       Eyes:  not assessed      ENT:  not assessed      Respiratory:  Positive for shortness of breath;cough night cough while lying done, wakens pt   Cardiovascular:  Negative;palpitations;chest pain;edema;lightheadedness;dizziness fatigue;Positive for nausea from exercise  Gastroenterology: not assessed      Genitourinary:  not assessed      Musculoskeletal:  not assessed      Neurologic:  not assessed      Psychiatric:  not assessed      Heme/Lymph/Imm:  not assessed      Endocrine:  not assessed        Physical Exam:  Vitals: /77   Pulse 66   Ht 1.638 m (5' 4.5\")   Wt 77.6 kg (171 lb 1.6 oz)   SpO2 100%   BMI 28.92 kg/m  "   Eyes: No icterus.  Pulmonary: Chest symmetric, lungs clear bilaterally and no crackles, wheezes or rales.  Cardiovascular: RRR with normal S1 and S2, no murmur, JVP normal.  Musculoskeletal: Edema of the lower extremities: None.  Neurologic: Oriented and appropriate without obvious focal deficits.   Psychiatric: Normal affect.     Recent Lab Results:  LIPID RESULTS:  Lab Results   Component Value Date    TRIG 99 07/15/2022       LIVER ENZYME RESULTS:  No results found for: AST, ALT    CBC RESULTS:  Lab Results   Component Value Date    HGB 10.7 (L) 07/22/2021    HGB 11.2 (L) 05/28/2020       BMP RESULTS:  Lab Results   Component Value Date     05/28/2020    POTASSIUM 3.8 07/21/2021    POTASSIUM 3.5 05/28/2020    CHLORIDE 103 07/15/2022    CHLORIDE 111 (H) 05/28/2020    CO2 23 05/28/2020    ANIONGAP 6 05/28/2020     (H) 05/28/2020    BUN 15 05/28/2020    CR 0.69 07/21/2021    CR 0.64 05/28/2020    GFRESTIMATED 88 07/21/2021    GFRESTIMATED >90 05/28/2020    GFRESTBLACK >90 05/28/2020    NADJA 8.3 (L) 05/28/2020        A1C RESULTS:  No results found for: A1C    INR RESULTS:  No results found for: INR    CC  No referring provider defined for this encounter.    All medical records were reviewed in detail and discussed with the patient. Greater than 30 mins were spent with the patient, 50% of this time was spent on counseling and coordination of care.  After visit summary was printed and given to the patient.

## 2022-07-21 NOTE — LETTER
2022    Pilar Pop MD  [address]  [facility] [address]    RE: Inés Griggs       Dear Colleague,     I had the pleasure of seeing Inés Griggs in the [facility].  HPI and Plan:   Today I had the pleasure of seeing Inés Griggs at [facility]. She is a pleasant 72 year old patient with a past medical history of hypertension who presents to the clinic in initial consultation.  The patient is noted progressive fatigue and exertional dyspnea.  She exercises on a regular basis and tells me that for the last some time she is noted difficulty in keeping up with her peers.  Reports some 'uneasiness' in the chest on occasion.  Denies palpitation.    She also reports being stressed due to a lot of things going on in her family.  Her brother in law recently  of a massive heart attack.  Her sister was recently told that she has blockage in one of the arteries.  Her elder brother recently had a fall related to syncopal episode and sustained cervical vertebrae fracture.  As far as family history is concerned her mother  from aortic dissection, father  from heart attack in his 80s.  The patient is not a smoker.  She tells me that she is extremely worried about the possibility of having block coronary artery and having the same fate as her other family members.  She had a stress echocardiogram in 2018 which was essentially normal.  An echocardiogram in 2016 was also normal.  I reviewed these tests independently today.  Her LDL is 150 mg/dL.  She takes 10 mg of Crestor 3 times a week.    As far as hypertension is concerned she manually checks her blood pressure at home and finds it to be somewhat elevated on a regular basis.  However she was recently started on Norvasc/benazepril which has improved her blood pressure.    Assessment and plan  1.  Exercise intolerance/chest tightness  2.  Essential hypertension  3.  Anxiety    I discussed with  the patient that we can perform a CT coronary angiogram for definitive rule out of coronary artery disease.  The patient would like to get this done.  We discussed that risks and benefits of the procedure including the risks of using IV contrast.  She understands and is willing to undergo the procedure.  For now, I will continue her on all the current medications she is on.  However, I would recommend increasing the frequency of Crestor gradually with an goal of taking it daily.  I will repeat a fasting lipid profile in 6 months.  I will reach out to her with the results of the test and make a follow-up appointment as needed.    Recommendations  1.  CT coronary angiogram  2.   try to take Crestor on a daily basis.  3.  Fasting lipid profile and ALT in 6 months.    Thank you for allowing me to participate in the care of Inés Griggs    This note was completed in part using Dragon voice recognition software. Although reviewed after completion, some word and grammatical errors may occur.    Anthony Moy MD  Cardiology    No orders of the defined types were placed in this encounter.      Orders Placed This Encounter   Medications     aspirin (ASPIRIN) 81 MG EC tablet     Sig: Take 81 mg by mouth daily     cholecalciferol (VITAMIN D3) 125 mcg (5000 units) capsule     amLODIPine-benazepril (LOTREL) 2.5-10 MG capsule     Si capsule daily     Multiple Vitamins-Minerals (PRESERVISION AREDS 2) CAPS     Sig: Take by mouth 2 times daily       Medications Discontinued During This Encounter   Medication Reason     aspirin (ASA) 325 MG EC tablet Dose adjustment       Encounter Diagnosis   Name Primary?     Atypical chest pain Yes       CURRENT MEDICATIONS:  Current Outpatient Medications   Medication Sig Dispense Refill     amLODIPine-benazepril (LOTREL) 2.5-10 MG capsule 1 capsule daily       aspirin (ASPIRIN) 81 MG EC tablet Take 81 mg by mouth daily       cholecalciferol (VITAMIN D3) 125 mcg (5000 units) capsule         Coenzyme Q10 (COQ-10 PO) Take 1 tablet by mouth every other day       diphenhydrAMINE (BENADRYL) 25 MG tablet Take 25 mg by mouth nightly as needed for itching or allergies       Glucosamine HCl (GLUCOSAMINE PO) Take 1 tablet by mouth daily as needed       losartan-hydrochlorothiazide (HYZAAR) 100-25 MG tablet Take 1 tablet by mouth every morning       Multiple Vitamins-Minerals (PRESERVISION AREDS 2) CAPS Take by mouth 2 times daily       Omega-3 Fatty Acids (FISH OIL PO) Take 2 capsules by mouth daily       rosuvastatin (CRESTOR) 10 MG tablet Take 10 mg by mouth three times a week (Patient taking differently than prescribed due to muscle pain: RX states take 10mg daily)       sertraline (ZOLOFT) 100 MG tablet Take 50 mg by mouth daily (takes 0.5 x 100mg)       acetaminophen (TYLENOL) 325 MG tablet Take 3 tablets (975 mg) by mouth 4 times daily as needed for other (mild pain) (Patient not taking: Reported on 7/21/2022) 100 tablet 0     calcium carbonate (TUMS) 500 MG chewable tablet Take 1 chew tab by mouth 2 times daily as needed for heartburn (Patient not taking: Reported on 7/21/2022)       celecoxib (CELEBREX) 200 MG capsule Take 1 capsule (200 mg) by mouth daily Do not take within 6 hours of ibuprofen (MOTRIN, ADVIL) or ketorolac (TORADOL) if prescribed. (Patient not taking: Reported on 7/21/2022) 30 capsule 0     multivitamin w/minerals (THERA-VIT-M) tablet Take 1 tablet by mouth daily (Patient not taking: Reported on 7/21/2022)       oxyCODONE (ROXICODONE) 5 MG tablet Take 1-2 tablets (5-10 mg) by mouth every 4 hours as needed for pain (Patient not taking: Reported on 7/21/2022) 40 tablet 0     senna-docusate (SENOKOT-S/PERICOLACE) 8.6-50 MG tablet Take 1-2 tablets by mouth 2 times daily Take while on oral narcotics to prevent or treat constipation. (Patient not taking: Reported on 7/21/2022) 30 tablet 0       ALLERGIES     Allergies   Allergen Reactions     Zetia [Ezetimibe] Muscle Pain (Myalgia)        PAST MEDICAL HISTORY:  Past Medical History:   Diagnosis Date     Anxiety      Arthritis      Hyperlipidemia      Hypertension      Impaired fasting glucose      Osteopenia        PAST SURGICAL HISTORY:  Past Surgical History:   Procedure Laterality Date     ARTHROPLASTY HIP ANTERIOR Right 2020    Procedure: RIGHT TOTAL HIP ARTHROPLASTY DIRECT ANTERIOR APPROACH;  Surgeon: Usama Christianson MD;  Location: [facility] OR     ARTHROPLASTY KNEE Left 2021    Procedure: left total knee arthroplasty;  Surgeon: Usama Christianson MD;  Location: [facility] OR     [facility] AFF SURGICAL PATHOLOGY      benign     ENT SURGERY      parotid ectomy     GYN SURGERY      c section     ORTHOPEDIC SURGERY Right     RT       FAMILY HISTORY:  Family History   Problem Relation Age of Onset     Hypertension Mother      Aneurysm Mother      Myocardial Infarction Father      Atrial fibrillation Sister        SOCIAL HISTORY:  Social History     Socioeconomic History     Marital status:      Spouse name: None     Number of children: None     Years of education: None     Highest education level: None   Tobacco Use     Smoking status: Former Smoker     Packs/day: 0.75     Years: 15.00     Pack years: 11.25     Types: Cigarettes     Start date:      Quit date:      Years since quittin.5     Smokeless tobacco: Never Used   Vaping Use     Vaping Use: Never used   Substance and Sexual Activity     Alcohol use: Yes     Comment: minimal     Drug use: Not Currently       Review of Systems:  Skin:  not assessed       Eyes:  not assessed      ENT:  not assessed      Respiratory:  Positive for shortness of breath;cough night cough while lying done, wakens pt   Cardiovascular:  Negative;palpitations;chest pain;edema;lightheadedness;dizziness fatigue;Positive for nausea from exercise  Gastroenterology: not assessed      Genitourinary:  not assessed      Musculoskeletal:  not assessed      Neurologic:  not assessed      Psychiatric:  not  "assessed      Heme/Lymph/Imm:  not assessed      Endocrine:  not assessed        Physical Exam:  Vitals: /77   Pulse 66   Ht 1.638 m (5' 4.5\")   Wt 77.6 kg (171 lb 1.6 oz)   SpO2 100%   BMI 28.92 kg/m    Eyes: No icterus.  Pulmonary: Chest symmetric, lungs clear bilaterally and no crackles, wheezes or rales.  Cardiovascular: RRR with normal S1 and S2, no murmur, JVP normal.  Musculoskeletal: Edema of the lower extremities: None.  Neurologic: Oriented and appropriate without obvious focal deficits.   Psychiatric: Normal affect.     Recent Lab Results:  LIPID RESULTS:  Lab Results   Component Value Date    TRIG 99 07/15/2022       LIVER ENZYME RESULTS:  No results found for: AST, ALT    CBC RESULTS:  Lab Results   Component Value Date    HGB 10.7 (L) 07/22/2021    HGB 11.2 (L) 05/28/2020       BMP RESULTS:  Lab Results   Component Value Date     05/28/2020    POTASSIUM 3.8 07/21/2021    POTASSIUM 3.5 05/28/2020    CHLORIDE 103 07/15/2022    CHLORIDE 111 (H) 05/28/2020    CO2 23 05/28/2020    ANIONGAP 6 05/28/2020     (H) 05/28/2020    BUN 15 05/28/2020    CR 0.69 07/21/2021    CR 0.64 05/28/2020    GFRESTIMATED 88 07/21/2021    GFRESTIMATED >90 05/28/2020    GFRESTBLACK >90 05/28/2020    NADJA 8.3 (L) 05/28/2020        A1C RESULTS:  No results found for: A1C    INR RESULTS:  No results found for: INR    CC  No referring provider defined for this encounter.    All medical records were reviewed in detail and discussed with the patient. Greater than 30 mins were spent with the patient, 50% of this time was spent on counseling and coordination of care.  After visit summary was printed and given to the patient.        Thank you for allowing me to participate in the care of your patient.      Sincerely,     Anthony Moy MD     [facility]  cc:   No referring provider defined for this encounter.        "

## 2022-08-09 ENCOUNTER — TELEPHONE (OUTPATIENT)
Dept: CARDIOLOGY | Facility: CLINIC | Age: 72
End: 2022-08-09

## 2022-08-09 ENCOUNTER — MYC MEDICAL ADVICE (OUTPATIENT)
Dept: CARDIOLOGY | Facility: CLINIC | Age: 72
End: 2022-08-09

## 2022-08-09 ENCOUNTER — HOSPITAL ENCOUNTER (OUTPATIENT)
Dept: CARDIOLOGY | Facility: CLINIC | Age: 72
Discharge: HOME | End: 2022-08-09
Attending: INTERNAL MEDICINE

## 2022-08-09 VITALS — HEART RATE: 68 BPM | SYSTOLIC BLOOD PRESSURE: 129 MMHG | DIASTOLIC BLOOD PRESSURE: 71 MMHG

## 2022-08-09 DIAGNOSIS — R07.89 ATYPICAL CHEST PAIN: Primary | ICD-10-CM

## 2022-08-09 DIAGNOSIS — R07.89 ATYPICAL CHEST PAIN: ICD-10-CM

## 2022-08-09 PROCEDURE — 75571 CT HRT W/O DYE W/CA TEST: CPT | Mod: 26 | Performed by: INTERNAL MEDICINE

## 2022-08-09 PROCEDURE — 75571 CT HRT W/O DYE W/CA TEST: CPT

## 2022-08-09 PROCEDURE — 250N000013 HC RX MED GY IP 250 OP 250 PS 637: Performed by: INTERNAL MEDICINE

## 2022-08-09 RX ORDER — DIPHENHYDRAMINE HYDROCHLORIDE 50 MG/ML
25-50 INJECTION INTRAMUSCULAR; INTRAVENOUS
Status: DISCONTINUED | OUTPATIENT
Start: 2022-08-09 | End: 2022-08-10 | Stop reason: HOSPADM

## 2022-08-09 RX ORDER — DIPHENHYDRAMINE HCL 25 MG
25 CAPSULE ORAL
Status: DISCONTINUED | OUTPATIENT
Start: 2022-08-09 | End: 2022-08-10 | Stop reason: HOSPADM

## 2022-08-09 RX ORDER — METHYLPREDNISOLONE SODIUM SUCCINATE 125 MG/2ML
125 INJECTION, POWDER, LYOPHILIZED, FOR SOLUTION INTRAMUSCULAR; INTRAVENOUS
Status: DISCONTINUED | OUTPATIENT
Start: 2022-08-09 | End: 2022-08-10 | Stop reason: HOSPADM

## 2022-08-09 RX ORDER — ACYCLOVIR 200 MG/1
0-1 CAPSULE ORAL
Status: DISCONTINUED | OUTPATIENT
Start: 2022-08-09 | End: 2022-08-10 | Stop reason: HOSPADM

## 2022-08-09 RX ORDER — NITROGLYCERIN 0.4 MG/1
0.4 TABLET SUBLINGUAL
Status: DISCONTINUED | OUTPATIENT
Start: 2022-08-09 | End: 2022-08-10 | Stop reason: HOSPADM

## 2022-08-09 RX ORDER — IVABRADINE 5 MG/1
5-15 TABLET, FILM COATED ORAL
Status: DISCONTINUED | OUTPATIENT
Start: 2022-08-09 | End: 2022-08-10 | Stop reason: HOSPADM

## 2022-08-09 RX ORDER — METOPROLOL TARTRATE 25 MG/1
25-100 TABLET, FILM COATED ORAL
Status: COMPLETED | OUTPATIENT
Start: 2022-08-09 | End: 2022-08-09

## 2022-08-09 RX ORDER — DILTIAZEM HYDROCHLORIDE 5 MG/ML
10-15 INJECTION INTRAVENOUS
Status: DISCONTINUED | OUTPATIENT
Start: 2022-08-09 | End: 2022-08-10 | Stop reason: HOSPADM

## 2022-08-09 RX ORDER — IOPAMIDOL 755 MG/ML
500 INJECTION, SOLUTION INTRAVASCULAR ONCE
Status: DISCONTINUED | OUTPATIENT
Start: 2022-08-09 | End: 2022-08-10 | Stop reason: HOSPADM

## 2022-08-09 RX ORDER — ONDANSETRON 2 MG/ML
4 INJECTION INTRAMUSCULAR; INTRAVENOUS
Status: DISCONTINUED | OUTPATIENT
Start: 2022-08-09 | End: 2022-08-10 | Stop reason: HOSPADM

## 2022-08-09 RX ORDER — DILTIAZEM HCL 60 MG
120 TABLET ORAL
Status: DISCONTINUED | OUTPATIENT
Start: 2022-08-09 | End: 2022-08-10 | Stop reason: HOSPADM

## 2022-08-09 RX ORDER — METOPROLOL TARTRATE 1 MG/ML
5-15 INJECTION, SOLUTION INTRAVENOUS
Status: DISCONTINUED | OUTPATIENT
Start: 2022-08-09 | End: 2022-08-10 | Stop reason: HOSPADM

## 2022-08-09 RX ADMIN — METOPROLOL TARTRATE 100 MG: 50 TABLET, FILM COATED ORAL at 09:10

## 2022-08-09 NOTE — TELEPHONE ENCOUNTER
received the information about the CT Calcium score from the screening today, 604 total score.  They were unable to proceed with the CT Angiogram due to all the plaque.    Dr. Moy would like for Inés to have an exercise nuclear stress test.    has entered the orders.  He did send message that she should HOLD her beta blocker for the test.     placed call to Inés, had to leave a . Informed her about the CT Calcium score and that was why they could not complete the CT Angio testing.  Informed her that Dr. Moy wants her to take the Rosuvastatin medication that she has, daily.  She has a note in chart that she was only taking 3 times a week due to not tolerating higher dose.      also left her our nurse line phone number to call with any questions, and  has sent her a frestyl message also.    Sindi Roman RN on 8/9/2022 at 4:05 PM

## 2022-08-11 NOTE — TELEPHONE ENCOUNTER
Writer called to Inés again today, LVM to let her know that she is to hold the beta blocker on the day of her stress test, per a message I received from Dr. Moy.    Bari does not see that Inés is on a beta blocker medication.  She takes Amlodipine and Losartan\HCTZ     Writer did request that Inés call us back to confirm that she received our messages.    Sindi Roman RN on 8/11/2022 at 9:58 AM

## 2022-08-18 ENCOUNTER — HOSPITAL ENCOUNTER (OUTPATIENT)
Dept: CARDIOLOGY | Facility: CLINIC | Age: 72
Discharge: HOME | End: 2022-08-18
Attending: INTERNAL MEDICINE
Payer: COMMERCIAL

## 2022-08-18 VITALS
SYSTOLIC BLOOD PRESSURE: 124 MMHG | HEART RATE: 75 BPM | HEIGHT: 65 IN | OXYGEN SATURATION: 98 % | BODY MASS INDEX: 28.09 KG/M2 | WEIGHT: 168.6 LBS | DIASTOLIC BLOOD PRESSURE: 82 MMHG

## 2022-08-18 DIAGNOSIS — R07.89 ATYPICAL CHEST PAIN: ICD-10-CM

## 2022-08-18 LAB
CV STRESS MAX HR HE: 136
NUC STRESS EJECTION FRACTION: 71 %
RATE PRESSURE PRODUCT: NORMAL
STRESS ANGINA INDEX: 0
STRESS ECHO BASELINE DIASTOLIC HE: 74
STRESS ECHO BASELINE HR: 74 BPM
STRESS ECHO BASELINE SYSTOLIC BP: 122
STRESS ECHO CALCULATED PERCENT HR: 91 %
STRESS ECHO LAST STRESS DIASTOLIC BP: 72
STRESS ECHO LAST STRESS SYSTOLIC BP: 168
STRESS ECHO POST ESTIMATED WORKLOAD: 10.1 METS
STRESS ECHO POST EXERCISE DUR MIN: 8 MIN
STRESS ECHO POST EXERCISE DUR SEC: 0 SEC
STRESS ECHO TARGET HR: 149

## 2022-08-18 PROCEDURE — 93018 CV STRESS TEST I&R ONLY: CPT | Performed by: INTERNAL MEDICINE

## 2022-08-18 PROCEDURE — 93016 CV STRESS TEST SUPVJ ONLY: CPT | Performed by: INTERNAL MEDICINE

## 2022-08-18 PROCEDURE — 78452 HT MUSCLE IMAGE SPECT MULT: CPT | Mod: 26 | Performed by: INTERNAL MEDICINE

## 2022-08-18 PROCEDURE — A9502 TC99M TETROFOSMIN: HCPCS | Performed by: INTERNAL MEDICINE

## 2022-08-18 PROCEDURE — 343N000001 HC RX 343: Performed by: INTERNAL MEDICINE

## 2022-08-18 PROCEDURE — 93017 CV STRESS TEST TRACING ONLY: CPT

## 2022-08-18 RX ADMIN — TETROFOSMIN 10.02 MCI.: 1.38 INJECTION, POWDER, LYOPHILIZED, FOR SOLUTION INTRAVENOUS at 10:38

## 2022-08-18 RX ADMIN — TETROFOSMIN 3.49 MCI.: 1.38 INJECTION, POWDER, LYOPHILIZED, FOR SOLUTION INTRAVENOUS at 09:16

## 2022-08-19 ENCOUNTER — TELEPHONE (OUTPATIENT)
Dept: CARDIOLOGY | Facility: CLINIC | Age: 72
End: 2022-08-19

## 2022-08-19 NOTE — TELEPHONE ENCOUNTER
Phone call to patient to review nuc stress test from yesterday that showed normal perfusion and hemodynamic response to exercise. She said she felt fine on the treadmill and did not experience any chest discomfort or unusual shortness of breath.    The stress test was done because her CT calcium was >600.    I emphasized to her that she does have CAD but the stress test shows that its not to the extent where its interfering with blood flow. Thus, the there is a very low likelihood that her shortness of breath is due to a blockage.    She admits that over the past few years she has been inactive and put on weight. She has started back to pole walking with friends.    I pointed out to her that because she has CAD her LDL needs to be at least 70 if not lower. She is tolerating Crestor 10 MG daily w/o the leg pain. She will touch base with her PCP about getting a lipid panel in a few months.    I told her that I will have Dr. Moy review her study next week and if he has additional comments we will get back to her. Follow up will be on  An as needed basis. I encouraged her to call anytime with questions or concerns             The nuclear stress test is negative for inducible myocardial ischemia or infarction. The left ventricular ejection fraction at stress is 71%. Left ventricular function is normal.     There is no prior study for comparison

## 2022-08-25 NOTE — TELEPHONE ENCOUNTER
Anthony Moy MD  You 12 minutes ago (2:44 PM)     RR    No new recommendations. She should continue to take crestor daily.     Message text

## 2022-10-22 ENCOUNTER — HEALTH MAINTENANCE LETTER (OUTPATIENT)
Age: 72
End: 2022-10-22

## 2023-04-25 ENCOUNTER — HOSPITAL ENCOUNTER (OUTPATIENT)
Dept: MAMMOGRAPHY | Facility: CLINIC | Age: 73
Discharge: HOME | End: 2023-04-25
Attending: FAMILY MEDICINE | Admitting: FAMILY MEDICINE
Payer: COMMERCIAL

## 2023-04-25 DIAGNOSIS — Z12.31 VISIT FOR SCREENING MAMMOGRAM: ICD-10-CM

## 2023-04-25 PROCEDURE — 77067 SCR MAMMO BI INCL CAD: CPT

## 2023-08-27 ENCOUNTER — HEALTH MAINTENANCE LETTER (OUTPATIENT)
Age: 73
End: 2023-08-27

## 2024-03-04 ENCOUNTER — TRANSFERRED RECORDS (OUTPATIENT)
Dept: HEALTH INFORMATION MANAGEMENT | Facility: CLINIC | Age: 74
End: 2024-03-04
Payer: COMMERCIAL

## 2024-03-04 ENCOUNTER — MEDICAL CORRESPONDENCE (OUTPATIENT)
Dept: HEALTH INFORMATION MANAGEMENT | Facility: CLINIC | Age: 74
End: 2024-03-04
Payer: COMMERCIAL

## 2024-03-04 DIAGNOSIS — I10 HTN (HYPERTENSION): ICD-10-CM

## 2024-03-04 DIAGNOSIS — I48.91 NEW ONSET A-FIB (H): Primary | ICD-10-CM

## 2024-03-05 ENCOUNTER — HOSPITAL ENCOUNTER (OUTPATIENT)
Dept: CARDIOLOGY | Facility: CLINIC | Age: 74
Discharge: HOME | End: 2024-03-05
Attending: FAMILY MEDICINE | Admitting: FAMILY MEDICINE
Payer: COMMERCIAL

## 2024-03-05 DIAGNOSIS — I48.91 NEW ONSET A-FIB (H): ICD-10-CM

## 2024-03-05 DIAGNOSIS — I10 HTN (HYPERTENSION): ICD-10-CM

## 2024-03-05 LAB — LVEF ECHO: NORMAL

## 2024-03-05 PROCEDURE — 999N000208 ECHOCARDIOGRAM COMPLETE

## 2024-03-05 PROCEDURE — 93306 TTE W/DOPPLER COMPLETE: CPT | Mod: 26 | Performed by: INTERNAL MEDICINE

## 2024-03-05 PROCEDURE — C8929 TTE W OR WO FOL WCON,DOPPLER: HCPCS

## 2024-03-05 PROCEDURE — 255N000002 HC RX 255 OP 636: Performed by: PHARMACIST

## 2024-03-05 RX ADMIN — HUMAN ALBUMIN MICROSPHERES AND PERFLUTREN 9 ML: 10; .22 INJECTION, SOLUTION INTRAVENOUS at 10:57

## 2024-03-06 ENCOUNTER — OFFICE VISIT (OUTPATIENT)
Dept: CARDIOLOGY | Facility: CLINIC | Age: 74
End: 2024-03-06
Payer: COMMERCIAL

## 2024-03-06 VITALS
SYSTOLIC BLOOD PRESSURE: 130 MMHG | WEIGHT: 167 LBS | DIASTOLIC BLOOD PRESSURE: 72 MMHG | HEART RATE: 72 BPM | HEIGHT: 64 IN | BODY MASS INDEX: 28.51 KG/M2

## 2024-03-06 DIAGNOSIS — I10 ESSENTIAL HYPERTENSION: Primary | ICD-10-CM

## 2024-03-06 DIAGNOSIS — I25.10 CORONARY ARTERY DISEASE INVOLVING NATIVE CORONARY ARTERY OF NATIVE HEART WITHOUT ANGINA PECTORIS: ICD-10-CM

## 2024-03-06 DIAGNOSIS — I48.91 NEW ONSET A-FIB (H): ICD-10-CM

## 2024-03-06 DIAGNOSIS — E78.2 MIXED HYPERLIPIDEMIA: ICD-10-CM

## 2024-03-06 PROCEDURE — 93005 ELECTROCARDIOGRAM TRACING: CPT | Performed by: INTERNAL MEDICINE

## 2024-03-06 PROCEDURE — 99215 OFFICE O/P EST HI 40 MIN: CPT | Performed by: INTERNAL MEDICINE

## 2024-03-06 NOTE — PROGRESS NOTES
CARDIOLOGY CLINIC FOLLOW-UP NOTE      REASON FOR VISIT:   Newly diagnosed atrial fibrillation    PRIMARY CARE PHYSICIAN:  Pilar Pop        History of Present Illness   Inés Griggs is an extremely pleasant 73 year old female here as a new patient to establish care.  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  Her medical history is significant for paroxysmal AF diagnosed in 3/2024, medically managed CAD (calcium score 604/93rd percentile in 2022 with negative exercise MPI at that time), hypertension, and dyslipidemia.    She presents today to establish care.  She reports that she has been under considerable stress for the past 1-2 years for several reasons, including caring for her son who has been dealing with bipolar disorder.  A few weeks ago, she had a viral URI, and after that had been dealing with severe fatigue with any amount o in clinic today.  We discussed f exertion which was unusual for her.  Ultimately this led to palpitations and chest tightness, and due to this she went in for evaluation at [facility] physicians.  ECG in the office showed AF at 109 bpm, which was a new diagnosis for her.  She was started on Toprol-XL 50 mg daily and Eliquis 5 mg twice daily and has been tolerating these well without issue.  Today she feels her palpitations are improved and her pulse was regular in the office today, so I repeated an ECG which showed that she had reverted to sinus rhythm.    She did have an echo done on 3/5/2024, and this showed normal LVEF, no regional wall motion abnormalities, normal RV size and function, moderate left atrial dilation, and 1+ MR and 1+ TR.      Assessment & Plan     Paroxysmal AF, first diagnosed in 3/2024, currently in NSR, [id number] = 4  Medically managed CAD (calcium score 604/93rd percentile in 2022 with negative exercise MPI at that time), no obvious angina   Hypertension  Dyslipidemia      It was a pleasure to speak with Jada in clinic  today.  We discussed the implications of her paroxysmal AF, including the appropriate next steps in evaluation and management.  Ultimately, I recommended the following:      -Check labs: CBC, CMP, magnesium, TSH, lipids  -Sleep study  -Now that back in sinus rhythm, check 7-day Zio patch to look for any recurrent AF.  -If AF does recur and is highly symptomatic again, would strongly consider rhythm control (probably with ablation)  -If symptoms do not gradually resolve with maintenance of sinus rhythm, would consider repeat ischemic evaluation, potentially with angiography  -Continue Toprol-XL 50 mg daily  -Continue Eliquis 5 mg twice daily  -Stop baby aspirin  -Continue losartan-HCTZ 100-25 mg daily  -Change amlodipine-benazepril combo pill to amlodipine 2.5 mg daily alone (patient should not be taking both an ACE inhibitor and an angiotensin receptor blocker)  -Continue Crestor 10 mg daily          Follow-up: 2 months with [facility], or sooner as needed      On the date of the patient's visit, I spent a total of 45 minutes reviewing the patient's chart; interviewing, examining, and counseling the patient; coordinating with other providers as necessary, entering orders, and documenting in the medical chart.      Raymond Gomez MD  Interventional Cardiology  March 6, 2024      The longitudinal plan of care for the diagnosis(es)/condition(s) as documented were addressed during this visit. Due to the added complexity in care, I will continue to support Inés in the subsequent management and with ongoing continuity of care.        Medications   Current Outpatient Medications   Medication    acetaminophen (TYLENOL) 325 MG tablet    amLODIPine-benazepril (LOTREL) 2.5-10 MG capsule    aspirin (ASPIRIN) 81 MG EC tablet    calcium carbonate (TUMS) 500 MG chewable tablet    cholecalciferol (VITAMIN D3) 125 mcg (5000 units) capsule    Coenzyme Q10 (COQ-10 PO)    Glucosamine HCl (GLUCOSAMINE PO)    losartan-hydrochlorothiazide  (HYZAAR) 100-25 MG tablet    Multiple Vitamins-Minerals (PRESERVISION AREDS 2) CAPS    Omega-3 Fatty Acids (FISH OIL PO)    rosuvastatin (CRESTOR) 10 MG tablet    sertraline (ZOLOFT) 100 MG tablet    celecoxib (CELEBREX) 200 MG capsule    diphenhydrAMINE (BENADRYL) 25 MG tablet    multivitamin w/minerals (THERA-VIT-M) tablet    oxyCODONE (ROXICODONE) 5 MG tablet    senna-docusate (SENOKOT-S/PERICOLACE) 8.6-50 MG tablet     No current facility-administered medications for this visit.     Allergies   Allergies   Allergen Reactions    Zetia [Ezetimibe] Muscle Pain (Myalgia)         Physical Exam       BP: 130/72 Pulse: 72            Vital Signs with Ranges  Pulse:  [72] 72  BP: (130)/(72) 130/72  167 lbs 0 oz    Constitutional: Well-appearing, no acute distress  Respiratory: Normal respiratory effort, CTAB  Cardiovascular: RRR, faint systolic murmur at the apex.  JVP < 7 cm H2O.  There is no significant LE edema.  Normal carotid upstrokes, no carotid bruits.

## 2024-03-06 NOTE — LETTER
3/6/2024    Pilar Pop MD  [address]  [facility] [address]    RE: Inés Griggs       Dear Colleague,     I had the pleasure of seeing Inés Griggs in the [facility].  CARDIOLOGY CLINIC FOLLOW-UP NOTE      REASON FOR VISIT:   Newly diagnosed atrial fibrillation    PRIMARY CARE PHYSICIAN:  Pilar Pop        History of Present Illness  Inés Griggs is an extremely pleasant 73 year old female here as a new patient to establish care.  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  Her medical history is significant for paroxysmal AF diagnosed in 3/2024, medically managed CAD (calcium score 604/93rd percentile in 2022 with negative exercise MPI at that time), hypertension, and dyslipidemia.    She presents today to establish care.  She reports that she has been under considerable stress for the past 1-2 years for several reasons, including caring for her son who has been dealing with bipolar disorder.  A few weeks ago, she had a viral URI, and after that had been dealing with severe fatigue with any amount o in clinic today.  We discussed f exertion which was unusual for her.  Ultimately this led to palpitations and chest tightness, and due to this she went in for evaluation at [facility] physicians.  ECG in the office showed AF at 109 bpm, which was a new diagnosis for her.  She was started on Toprol-XL 50 mg daily and Eliquis 5 mg twice daily and has been tolerating these well without issue.  Today she feels her palpitations are improved and her pulse was regular in the office today, so I repeated an ECG which showed that she had reverted to sinus rhythm.    She did have an echo done on 3/5/2024, and this showed normal LVEF, no regional wall motion abnormalities, normal RV size and function, moderate left atrial dilation, and 1+ MR and 1+ TR.      Assessment & Plan    Paroxysmal AF, first diagnosed in 3/2024, currently in NSR, [id number]  = 4  Medically managed CAD (calcium score 604/93rd percentile in 2022 with negative exercise MPI at that time), no obvious angina   Hypertension  Dyslipidemia      It was a pleasure to speak with Jada in clinic today.  We discussed the implications of her paroxysmal AF, including the appropriate next steps in evaluation and management.  Ultimately, I recommended the following:      -Check labs: CBC, CMP, magnesium, TSH, lipids  -Sleep study  -Now that back in sinus rhythm, check 7-day Zio patch to look for any recurrent AF.  -If AF does recur and is highly symptomatic again, would strongly consider rhythm control (probably with ablation)  -If symptoms do not gradually resolve with maintenance of sinus rhythm, would consider repeat ischemic evaluation, potentially with angiography  -Continue Toprol-XL 50 mg daily  -Continue Eliquis 5 mg twice daily  -Stop baby aspirin  -Continue losartan-HCTZ 100-25 mg daily  -Change amlodipine-benazepril combo pill to amlodipine 2.5 mg daily alone (patient should not be taking both an ACE inhibitor and an angiotensin receptor blocker)  -Continue Crestor 10 mg daily          Follow-up: 2 months with [facility], or sooner as needed      On the date of the patient's visit, I spent a total of 45 minutes reviewing the patient's chart; interviewing, examining, and counseling the patient; coordinating with other providers as necessary, entering orders, and documenting in the medical chart.      Raymond Gomez MD  Interventional Cardiology  March 6, 2024      The longitudinal plan of care for the diagnosis(es)/condition(s) as documented were addressed during this visit. Due to the added complexity in care, I will continue to support Inés in the subsequent management and with ongoing continuity of care.        Medications  Current Outpatient Medications   Medication    acetaminophen (TYLENOL) 325 MG tablet    amLODIPine-benazepril (LOTREL) 2.5-10 MG capsule    aspirin (ASPIRIN) 81 MG EC  tablet    calcium carbonate (TUMS) 500 MG chewable tablet    cholecalciferol (VITAMIN D3) 125 mcg (5000 units) capsule    Coenzyme Q10 (COQ-10 PO)    Glucosamine HCl (GLUCOSAMINE PO)    losartan-hydrochlorothiazide (HYZAAR) 100-25 MG tablet    Multiple Vitamins-Minerals (PRESERVISION AREDS 2) CAPS    Omega-3 Fatty Acids (FISH OIL PO)    rosuvastatin (CRESTOR) 10 MG tablet    sertraline (ZOLOFT) 100 MG tablet    celecoxib (CELEBREX) 200 MG capsule    diphenhydrAMINE (BENADRYL) 25 MG tablet    multivitamin w/minerals (THERA-VIT-M) tablet    oxyCODONE (ROXICODONE) 5 MG tablet    senna-docusate (SENOKOT-S/PERICOLACE) 8.6-50 MG tablet     No current facility-administered medications for this visit.     Allergies  Allergies   Allergen Reactions    Zetia [Ezetimibe] Muscle Pain (Myalgia)         Physical Exam      BP: 130/72 Pulse: 72            Vital Signs with Ranges  Pulse:  [72] 72  BP: (130)/(72) 130/72  167 lbs 0 oz    Constitutional: Well-appearing, no acute distress  Respiratory: Normal respiratory effort, CTAB  Cardiovascular: RRR, faint systolic murmur at the apex.  JVP < 7 cm H2O.  There is no significant LE edema.  Normal carotid upstrokes, no carotid bruits.    Thank you for allowing me to participate in the care of your patient.      Sincerely,     Raymond Gomez MD   [facility]  cc:   Pilar Pop MD  [address] [facility] [address]  [place] [address]

## 2024-03-07 ENCOUNTER — LAB (OUTPATIENT)
Dept: LAB | Facility: CLINIC | Age: 74
End: 2024-03-07
Payer: COMMERCIAL

## 2024-03-07 DIAGNOSIS — E78.2 MIXED HYPERLIPIDEMIA: ICD-10-CM

## 2024-03-07 DIAGNOSIS — I10 ESSENTIAL HYPERTENSION: ICD-10-CM

## 2024-03-07 DIAGNOSIS — I25.10 CORONARY ARTERY DISEASE INVOLVING NATIVE CORONARY ARTERY OF NATIVE HEART WITHOUT ANGINA PECTORIS: ICD-10-CM

## 2024-03-07 LAB
ALBUMIN SERPL BCG-MCNC: 4.5 G/DL (ref 3.5–5.2)
ALP SERPL-CCNC: 99 U/L (ref 40–150)
ALT SERPL W P-5'-P-CCNC: 35 U/L (ref 0–50)
ANION GAP SERPL CALCULATED.3IONS-SCNC: 14 MMOL/L (ref 7–15)
AST SERPL W P-5'-P-CCNC: 23 U/L (ref 0–45)
BILIRUB SERPL-MCNC: 0.4 MG/DL
BUN SERPL-MCNC: 16.3 MG/DL (ref 8–23)
CALCIUM SERPL-MCNC: 9.8 MG/DL (ref 8.8–10.2)
CHLORIDE SERPL-SCNC: 105 MMOL/L (ref 98–107)
CREAT SERPL-MCNC: 0.69 MG/DL (ref 0.51–0.95)
DEPRECATED HCO3 PLAS-SCNC: 24 MMOL/L (ref 22–29)
EGFRCR SERPLBLD CKD-EPI 2021: >90 ML/MIN/1.73M2
ERYTHROCYTE [DISTWIDTH] IN BLOOD BY AUTOMATED COUNT: 12.1 % (ref 10–15)
GLUCOSE SERPL-MCNC: 109 MG/DL (ref 70–99)
HCT VFR BLD AUTO: 36.5 % (ref 35–47)
HGB BLD-MCNC: 11.9 G/DL (ref 11.7–15.7)
MAGNESIUM SERPL-MCNC: 2 MG/DL (ref 1.7–2.3)
MCH RBC QN AUTO: 33 PG (ref 26.5–33)
MCHC RBC AUTO-ENTMCNC: 32.6 G/DL (ref 31.5–36.5)
MCV RBC AUTO: 101 FL (ref 78–100)
PLATELET # BLD AUTO: 344 10E3/UL (ref 150–450)
POTASSIUM SERPL-SCNC: 4.2 MMOL/L (ref 3.4–5.3)
PROT SERPL-MCNC: 7.5 G/DL (ref 6.4–8.3)
RBC # BLD AUTO: 3.61 10E6/UL (ref 3.8–5.2)
SODIUM SERPL-SCNC: 143 MMOL/L (ref 135–145)
TSH SERPL DL<=0.005 MIU/L-ACNC: 3.68 UIU/ML (ref 0.3–4.2)
WBC # BLD AUTO: 7.7 10E3/UL (ref 4–11)

## 2024-03-07 PROCEDURE — 85027 COMPLETE CBC AUTOMATED: CPT | Performed by: INTERNAL MEDICINE

## 2024-03-07 PROCEDURE — 83735 ASSAY OF MAGNESIUM: CPT | Performed by: INTERNAL MEDICINE

## 2024-03-07 PROCEDURE — 80053 COMPREHEN METABOLIC PANEL: CPT | Performed by: INTERNAL MEDICINE

## 2024-03-07 PROCEDURE — 84443 ASSAY THYROID STIM HORMONE: CPT | Performed by: INTERNAL MEDICINE

## 2024-03-07 PROCEDURE — 36415 COLL VENOUS BLD VENIPUNCTURE: CPT | Performed by: INTERNAL MEDICINE

## 2024-03-08 ENCOUNTER — TELEPHONE (OUTPATIENT)
Dept: CARDIOLOGY | Facility: CLINIC | Age: 74
End: 2024-03-08
Payer: COMMERCIAL

## 2024-03-08 RX ORDER — AMLODIPINE BESYLATE 2.5 MG/1
2.5 TABLET ORAL DAILY
Qty: 90 TABLET | Refills: 3 | Status: SHIPPED | OUTPATIENT
Start: 2024-03-08 | End: 2024-04-25

## 2024-03-08 RX ORDER — METOPROLOL SUCCINATE 50 MG/1
50 TABLET, EXTENDED RELEASE ORAL DAILY
Qty: 90 TABLET | Refills: 3 | Status: SHIPPED | OUTPATIENT
Start: 2024-03-08 | End: 2024-07-08

## 2024-03-08 NOTE — TELEPHONE ENCOUNTER
Received message from Dr. Gomez:     Raymond Gomez MD [facility] Heart Team 4  Hi all,    Would you mind reaching out to Ms. Griggs to let her know that her initial labs look good (lipids are still pending), but on closer review of her medication list, I did want to make a couple of changes.  She should continue the Toprol-XL and Eliquis started by her PCP (these were not entered into the system prior to our clinic visit so I enter them now), but she should stop taking the baby aspirin (baby aspirin is not needed when on the Eliquis), and also should stop taking the amlodipine-benazepril.  I will send in a new prescription for amlodipine alone, as she should not be taking both an ACE inhibitor (benazepril) and an angiotensin receptor blocker (losartan) at the same time.    Thanks!  Landon    Called pt, reviewed Dr. Gomez's recommendations to stop aspirin 81 mg daily, stop amlodipine-benazepril and start amlodipine 2.5 mg daily. Reviewed prescriptions have been sent to pt's pharmacy for amlodipine, metoprolol and Eliquis. Pt verbalized understanding and agreement with plan. Advised lipids have not yet resulted but will let pt know if any further recommendations once Dr. Gomez has reviewed lipid results.

## 2024-03-12 ASSESSMENT — SLEEP AND FATIGUE QUESTIONNAIRES
HOW LIKELY ARE YOU TO NOD OFF OR FALL ASLEEP WHILE SITTING AND READING: SLIGHT CHANCE OF DOZING
HOW LIKELY ARE YOU TO NOD OFF OR FALL ASLEEP WHILE SITTING AND TALKING TO SOMEONE: WOULD NEVER DOZE
HOW LIKELY ARE YOU TO NOD OFF OR FALL ASLEEP WHILE SITTING QUIETLY AFTER LUNCH WITHOUT ALCOHOL: SLIGHT CHANCE OF DOZING
HOW LIKELY ARE YOU TO NOD OFF OR FALL ASLEEP WHEN YOU ARE A PASSENGER IN A CAR FOR AN HOUR WITHOUT A BREAK: WOULD NEVER DOZE
HOW LIKELY ARE YOU TO NOD OFF OR FALL ASLEEP WHILE WATCHING TV: SLIGHT CHANCE OF DOZING
HOW LIKELY ARE YOU TO NOD OFF OR FALL ASLEEP WHILE SITTING INACTIVE IN A PUBLIC PLACE: WOULD NEVER DOZE
HOW LIKELY ARE YOU TO NOD OFF OR FALL ASLEEP WHILE LYING DOWN TO REST IN THE AFTERNOON WHEN CIRCUMSTANCES PERMIT: MODERATE CHANCE OF DOZING
HOW LIKELY ARE YOU TO NOD OFF OR FALL ASLEEP IN A CAR, WHILE STOPPED FOR A FEW MINUTES IN TRAFFIC: WOULD NEVER DOZE

## 2024-03-14 ENCOUNTER — OFFICE VISIT (OUTPATIENT)
Dept: SLEEP MEDICINE | Facility: CLINIC | Age: 74
End: 2024-03-14
Attending: INTERNAL MEDICINE
Payer: COMMERCIAL

## 2024-03-14 VITALS
DIASTOLIC BLOOD PRESSURE: 73 MMHG | OXYGEN SATURATION: 99 % | HEIGHT: 64 IN | SYSTOLIC BLOOD PRESSURE: 136 MMHG | BODY MASS INDEX: 27.68 KG/M2 | HEART RATE: 67 BPM | WEIGHT: 162.1 LBS

## 2024-03-14 DIAGNOSIS — G47.34 NOCTURNAL HYPOXEMIA: ICD-10-CM

## 2024-03-14 DIAGNOSIS — R06.83 SNORING: Primary | ICD-10-CM

## 2024-03-14 DIAGNOSIS — I48.91 NEW ONSET A-FIB (H): ICD-10-CM

## 2024-03-14 DIAGNOSIS — Z91.89 RISK FACTORS FOR OBSTRUCTIVE SLEEP APNEA: ICD-10-CM

## 2024-03-14 PROCEDURE — 99204 OFFICE O/P NEW MOD 45 MIN: CPT | Performed by: INTERNAL MEDICINE

## 2024-03-14 NOTE — PATIENT INSTRUCTIONS
"          MY TREATMENT INFORMATION FOR SLEEP APNEA-  Inés Watson Anson    DOCTOR : Ernestina Sandoval MD    Am I having a sleep study at a sleep center?  --->Due to normal delays, you will be contacted within 2-4 weeks to schedule    Am I having a home sleep study?  --->Watch the video for the device you are using:    -/drop off device-   https://www.Uniqueduube.com/watch?v=yGGFBdELGhk    -Disposable device sent out require phone/computer application-   https://www.Lender Sentinel.com/watch?v=BCce_vbiwxE      Frequently asked questions:  1. What is Obstructive Sleep Apnea (BRANDON)? BRANDON is the most common type of sleep apnea. Apnea means, \"without breath.\"  Apnea is most often caused by narrowing or collapse of the upper airway as muscles relax during sleep.   Almost everyone has occasional apneas. Most people with sleep apnea have had brief interruptions at night frequently for many years.  The severity of sleep apnea is related to how frequent and severe the events are.   2. What are the consequences of BRANDON? Symptoms include: feeling sleepy during the day, snoring loudly, gasping or stopping of breathing, trouble sleeping, and occasionally morning headaches or heartburn at night.  Sleepiness can be serious and even increase the risk of falling asleep while driving. Other health consequences may include development of high blood pressure and other cardiovascular disease in persons who are susceptible. Untreated BRANDON  can contribute to heart disease, stroke and diabetes.   3. What are the treatment options? In most situations, sleep apnea is a lifelong disease that must be managed with daily therapy. Medications are not effective for sleep apnea and surgery is generally not considered until other therapies have been tried. Your treatment is your choice . Continuous Positive Airway (CPAP) works right away and is the therapy that is effective in nearly everyone. An oral device to hold your jaw forward is usually the next " most reliable option. Other options include postioning devices (to keep you off your back), weight loss, and surgery including a tongue pacing device. There is more detail about some of these options below.  4. Are my sleep studies covered by insurance? Although we will request verification of coverage, we advise you also check in advance of the study to ensure there is coverage.    Important tips for those choosing CPAP and similar devices  REMEMBER-IF YOU RECEIVE A CALL FROM  [phone]-->IT IS TO SETUP A DEVICE  For new devices, sign up for device IDA to monitor your device for your followup visits  We encourage you to utilize the [employer] ida or website ([employer] web (resmed.com) ) to monitor your therapy progress and share the data with your healthcare team when you discuss your sleep apnea.                                                    Know your equipment:  CPAP is continuous positive airway pressure that prevents obstructive sleep apnea by keeping the throat from collapsing while you are sleeping. In most cases, the device is  smart  and can slowly self-adjusts if your throat collapses and keeps a record every day of how well you are treated-this information is available to you and your care team.  BPAP is bilevel positive airway pressure that keeps your throat open and also assists each breath with a pressure boost to maintain adequate breathing.  Special kinds of BPAP are used in patients who have inadequate breathing from lung or heart disease. In most cases, the device is  smart  and can slowly self-adjusts to assist breathing. Like CPAP, the device keeps a record of how well you are treated.  Your mask is your connection to the device. You get to choose what feels most comfortable and the staff will help to make sure if fits. Here: are some examples of the different masks that are available: Magnetic mask aids may assist with use but there are safety issues that should be addressed when  considering with magnets* ( see end of discussion).       Key points to remember on your journey with sleep apnea:  Sleep study.  PAP devices often need to be adjusted during a sleep study to show that they are effective and adjusted right.  Good tips to remember: Try wearing just the mask during a quiet time during the day so your body adapts to wearing it. A humidifier is recommended for comfort in most cases to prevent drying of your nose and throat. Allergy medication from your provider may help you if you are having nasal congestion.  Getting settled-in. It takes more than one night for most of us to get used to wearing a mask. Try wearing just the mask during a quiet time during the day so your body adapts to wearing it. A humidifier is recommended for comfort in most cases. Our team will work with you carefully on the first day and will be in contact within 4 days and again at 2 and 4 weeks for advice and remote device adjustments. Your therapy is evaluated by the device each day.   Use it every night. The more you are able to sleep naturally for 7-8 hours, the more likely you will have good sleep and to prevent health risks or symptoms from sleep apnea. Even if you use it 4 hours it helps. Occasionally all of us are unable to use a medical therapy, in sleep apnea, it is not dangerous to miss one night.   Communicate. Call our skilled team on the number provided on the first day if your visit for problems that make it difficult to wear the device. Over 2 out of 3 patients can learn to wear the device long-term with help from our team. Remember to call our team or your sleep providers if you are unable to wear the device as we may have other solutions for those who cannot adapt to mask CPAP therapy. It is recommended that you sleep your sleep provider within the first 3 months and yearly after that if you are not having problems.   Use it for your health. We encourage use of CPAP masks during daytime quiet  periods to allow your face and brain to adapt to the sensation of CPAP so that it will be a more natural sensation to awaken to at night or during naps. This can be very useful during the first few weeks or months of adapting to CPAP though it does not help medically to wear CPAP during wakefulness and  should not be used as a strategy just to meet guidelines.  Take care of your equipment. Make sure you clean your mask and tubing using directions every day and that your filter and mask are replaced as recommended or if they are not working.     *Masks with magnets:  Updated Contraindications  Masks with magnetic components are contraindicated for use by patients where they, or anyone in close physical contact while using the mask, have the following:   Active medical implants that interact with magnets (i.e., pacemakers, implantable cardioverter defibrillators (ICD), neurostimulators, cerebrospinal fluid (CSF) shunts, insulin/infusion pumps)   Metallic implants/objects containing ferromagnetic material (i.e., aneurysm clips/flow disruption devices, embolic coils, stents, valves, electrodes, implants to restore hearing or balance with implanted magnets, ocular implants, metallic splinters in the eye)  Updated Warning  Keep the mask magnets at a safe distance of at least 6 inches (150 mm) away from implants or medical devices that may be adversely affected by magnetic interference. This warning applies to you or anyone in close physical contact with your mask. The magnets are in the frame and lower headgear clips, with a magnetic field strength of up to 400mT. When worn, they connect to secure the mask but may inadvertently detach while asleep.  Implants/medical devices, including those listed within contraindications, may be adversely affected if they change function under external magnetic fields or contain ferromagnetic materials that attract/repel to magnetic fields (some metallic implants, e.g., contact lenses  with metal, dental implants, metallic cranial plates, screws, lawrence hole covers, and bone substitute devices). Consult your physician and  of your implant / other medical device for information on the potential adverse effects of magnetic fields.    BESIDES CPAP, WHAT OTHER THERAPIES ARE THERE?    Positioning Device  Positioning devices are generally used when sleep apnea is mild and only occurs on your back.This example shows a pillow that straps around the waist. It may be appropriate for those whose sleep study shows milder sleep apnea that occurs primarily when lying flat on one's back. Preliminary studies have shown benefit but effectiveness at home may need to be verified by a home sleep test. These devices are generally not covered by medical insurance.  Examples of devices that maintain sleeping on the back to prevent snoring and mild sleep apnea.    Belt type body positioner  http://OpenX/    Electronic reminder  http://nightshifttherapy.Light Extraction/            Oral Appliance  What is oral appliance therapy?  An oral appliance device fits on your teeth at night like a retainer used after having braces. The device is made by a specialized dentist and requires several visits over 1-2 months before a manufactured device is made to fit your teeth and is adjusted to prevent your sleep apnea. Once an oral device is working properly, snoring should be improved. A home sleep test may be recommended at that time if to determine whether the sleep apnea is adequately treated.       Some things to remember:  -Oral devices are often, but not always, covered by your medical insurance. Be sure to check with your insurance provider.   -If you are referred for oral therapy, you will be given a list of specialized dentists to consider or you may choose to visit the Web site of the American Academy of Dental Sleep Medicine  -Oral devices are less likely to work if you have severe sleep apnea or are extremely  overweight.     More detailed information  An oral appliance is a small acrylic device that fits over the upper and lower teeth  (similar to a retainer or a mouth guard). This device slightly moves jaw forward, which moves the base of the tongue forward, opens the airway, improves breathing for effective treat snoring and obstructive sleep apnea in perhaps 7 out of 10 people .  The best working devices are custom-made by a dental device  after a mold is made of the teeth 1, 2, 3.  When is an oral appliance indicated?  Oral appliance therapy is recommended as a first-line treatment for patients with primary snoring, mild sleep apnea, and for patients with moderate sleep apnea who prefer appliance therapy to use of CPAP4, 5. Severity of sleep apnea is determined by sleep testing and is based on the number of respiratory events per hour of sleep.   How successful is oral appliance therapy?  The success rate of oral appliance therapy in patients with mild sleep apnea is 75-80% while in patients with moderate sleep apnea it is 50-70%. The chance of success in patients with severe sleep apnea is 40-50%. The research also shows that oral appliances have a beneficial effect on the cardiovascular health of BRANDON patients at the same magnitude as CPAP therapy7.  Oral appliances should be a second-line treatment in cases of severe sleep apnea, but if not completely successful then a combination therapy utilizing CPAP plus oral appliance therapy may be effective. Oral appliances tend to be effective in a broad range of patients although studies show that the patients who have the highest success are females, younger patients, those with milder disease, and less severe obesity. 3, 6.   Finding a dentist that practices dental sleep medicine  Specific training is available through the American Academy of Dental Sleep Medicine for dentists interested in working in the field of sleep. To find a dentist who is educated in  the field of sleep and the use of oral appliances, near you, visit the Web site of the American Academy of Dental Sleep Medicine.    References  1. Rebecca, et al. Objectively measured vs self-reported compliance during oral appliance therapy for sleep-disordered breathing. Chest 2013; 144(5): 2938-8703.  2. Spencer et al. Objective measurement of compliance during oral appliance therapy for sleep-disordered breathing. Thorax 2013; 68(1): 91-96.  3. Cecy et al. Mandibular advancement devices in 620 men and women with BRANDON and snoring: tolerability and predictors of treatment success. Chest 2004; 125: 7307-6459.  4. Dustin, et al. Oral appliances for snoring and BRANDON: a review. Sleep 2006; 29: 244-262.  5. Shabbir et al. Oral appliance treatment for BRANDON: an update. J Clin Sleep Med 2014; 10(2): 215-227.  6. Ronen et al. Predictors of OSAH treatment outcome. J Dent Res 2007; 86: 8711-8587.      Weight Loss:   Your Body mass index is 27.82 kg/m .    Being overweight does not necessarily mean you will have health consequences.  Those who have BMI over 35 or over 27 with existing medical conditions carries greater risk.   Weight loss decreases severity of sleep apnea in most people with obesity. For those with mild obesity who have developed snoring with weight gain, even 15-30 pound weight loss can improve and occasionally milder eliminate sleep apnea.  Structured and life-long dietary and health habits are necessary to lose weight and keep healthier weight levels.     The Comprehensive Weight loss program offers all aspects of weight loss strategies including two Non-Surgical Weight Loss Programs: Medical Weight Management and our 24 Week Healthy Lifestyle Program:    Medical Weight Management: You will meet with a Medical Weight Management Provider, as well as a Registered Dietician. The program may include medication therapy, dietary education, recommended exercise and physical therapy programs,  monthly support group meetings, and possible psychological counseling. Follow up visits with the provider or dietician are scheduled based on your progress and needs.    24 Week Healthy Lifestyle Program: This unique program is designed to give you the support of weekly appointments and activities thru a 24-week period. It may include all of the components of the basic program (above), with the addition of 11 individual Health  Visits, 24-week access to the [employer]pipe website for over 700 online classes, and monthly support group meetings. This program has an out-of-pocket expense of $499 to cover the items that can not be billed to insurance (health coaches and [employer] access), and is non-refundable/non-transferable (you may be able to use a Health Savings Account; ask your HSA provider). There may be an optional meal replacement plan prescribed as well.   Surgical management achieves meaningful long-term weight loss and improvement in health risks in most patients with more severe obesity.      Sleep Apnea Surgery:    Surgery for obstructive sleep apnea is considered generally only when other therapies fail to work. Surgery may be discussed with you if you are having a difficult time tolerating CPAP and or when there is an abnormal structure that requires surgical correction.  Nose and throat surgeries often enlarge the airway to prevent collapse.  Most of these surgeries create pain for 1-2 weeks and up to half of the most common surgeries are not effective throughout life.  You should carefully discuss the benefits and drawbacks to surgery with your sleep provider and surgeon to determine if it is the best solution for you.   More information  Surgery for BRANDON is directed at areas that are responsible for narrowing or complete obstruction of the airway during sleep.  There are a wide range of procedures available to enlarge and/or stabilize the airway to prevent blockage of breathing in the three major  areas where it can occur: the palate, tongue, and nasal regions.  Successful surgical treatment depends on the accurate identification of the factors responsible for obstructive sleep apnea in each person.  A personalized approach is required because there is no single treatment that works well for everyone.  Because of anatomic variation, consultation with an examination by a sleep surgeon is a critical first step in determining what surgical options are best for each patient.  In some cases, examination during sedation may be recommended in order to guide the selection of procedures.  Patients will be counseled about risks and benefits as well as the typical recovery course after surgery. Surgery is typically not a cure for a person s BRANDON.  However, surgery will often significantly improve one s BRANDON severity (termed  success rate ).  Even in the absence of a cure, surgery will decrease the cardiovascular risk associated with OSA7; improve overall quality of life8 (sleepiness, functionality, sleep quality, etc).      Palate Procedures:  Patients with BRANDON often have narrowing of their airway in the region of their tonsils and uvula.  The goals of palate procedures are to widen the airway in this region as well as to help the tissues resist collapse.  Modern palate procedure techniques focus on tissue conservation and soft tissue rearrangement, rather than tissue removal.  Often the uvula is preserved in this procedure. Residual sleep apnea is common in patient after pharyngoplasty with an average reduction in sleep apnea events of 33%2.      Tongue Procedures:  ExamWhile patients are awake, the muscles that surround the throat are active and keep this region open for breathing. These muscles relax during sleep, allowing the tongue and other structures to collapse and block breathing.  There are several different tongue procedures available.  Selection of a tongue base procedure depends on characteristics seen on  physical exam.  Generally, procedures are aimed at removing bulky tissues in this area or preventing the back of the tongue from falling back during sleep.  Success rates for tongue surgery range from 50-62%3.    Hypoglossal Nerve Stimulation:  Hypoglossal nerve stimulation has recently received approval from the [place] Food and Drug Administration for the treatment of obstructive sleep apnea.  This is based on research showing that the system was safe and effective in treating sleep apnea6.  Results showed that the median AHI score decreased 68%, from 29.3 to 9.0. This therapy uses an implant system that senses breathing patterns and delivers mild stimulation to airway muscles, which keeps the airway open during sleep.  The system consists of three fully implanted components: a small generator (similar in size to a pacemaker), a breathing sensor, and a stimulation lead.  Using a small handheld remote, a patient turns the therapy on before bed and off upon awakening.    Candidates for this device must be greater than 18 years of age, have moderate to severe obstructive sleep apnea with less than 25% central events  (AHI between 15-65), BMI less than 35, have tried CPAP/oral appliance for at least 8 weeks without success, and have appropriate upper airway anatomy (determined by a sleep endoscopy performed by Dr. Lanre Cali or Dr. Garret Lee).    Nasal Procedures:  Nasal obstruction can interfere with nasal breathing during the day and night.  Studies have shown that relief of nasal obstruction can improve the ability of some patients to tolerate positive airway pressure therapy for obstructive sleep apnea1.  Treatment options include medications such as nasal saline, topical corticosteroid and antihistamine sprays, and oral medications such as antihistamines or decongestants. Non-surgical treatments can include external nasal dilators for selected patients. If these are not successful by themselves,  surgery can improve the nasal airway either alone or in combination with these other options.        Combination Procedures:  Combination of surgical procedures and other treatments may be recommended, particularly if patients have more than one area of narrowing or persistent positional disease.  The success rate of combination surgery ranges from 66-80%2,3.    References  Radha MORENO. The Role of the Nose in Snoring and Obstructive Sleep Apnoea: An Update.  Eur Arch Otorhinolaryngol. 2011; 268: 1365-73.   Cathie SM; Jacklyn JA; Seun JR; Pallanch JF; Arcelia MB; Blue SG; Halima WOODALL. Surgical modifications of the upper airway for obstructive sleep apnea in adults: a systematic review and meta-analysis. SLEEP 2010;33(10):7280-4850. Michelle WHITLOCK. Hypopharyngeal surgery in obstructive sleep apnea: an evidence-based medicine review.  Arch Otolaryngol Head Neck Surg. 2006 Feb;132(2):206-13.  Will YH1, Dario Y, Kyree SETH. The efficacy of anatomically based multilevel surgery for obstructive sleep apnea. Otolaryngol Head Neck Surg. 2003 Oct;129(4):327-35.  Michelle WHITLOCK, Goldberg A. Hypopharyngeal Surgery in Obstructive Sleep Apnea: An Evidence-Based Medicine Review. Arch Otolaryngol Head Neck Surg. 2006 Feb;132(2):206-13.  Primo DESIR et al. Upper-Airway Stimulation for Obstructive Sleep Apnea.  N Engl J Med. 2014 Jan 9;370(2):139-49.  Carina Y et al. Increased Incidence of Cardiovascular Disease in Middle-aged Men with Obstructive Sleep Apnea. Am J Respir Crit Care Med; 2002 166: 159-165  Harper EM et al. Studying Life Effects and Effectiveness of Palatopharyngoplasty (SLEEP) study: Subjective Outcomes of Isolated Uvulopalatopharyngoplasty. Otolaryngol Head Neck Surg. 2011; 144: 623-631.        WHAT IF I ONLY HAVE SNORING?    Mandibular advancement devices, lateral sleep positioning, long-term weight loss and treatment of nasal allergies have been shown to improve snoring.  Exercising tongue muscles with a game  (https://iPixCel.Oh My Green!.BIGWORDS.com/us/ida/soundly-reduce-snoring/ou1497458981) or stimulating the tongue during the day with a device (https://doi.org/10.3390/cwj00882249) have improved snoring in some individuals.  https://www.Plazapoints (Cuponium).BIGWORDS.com/  https://www.sleepfoundation.org/best-anti-snoring-mouthpieces-and-mouthguards    Remember to Drive Safe... Drive Alive     Sleep health profoundly affects your health, mood, and your safety.  Thirty three percent of the population (one in three of us) is not getting enough sleep and many have a sleep disorder. Not getting enough sleep or having an untreated / undertreated sleep condition may make us sleepy without even knowing it. In fact, our driving could be dramatically impaired due to our sleep health. As your provider, here are some things I would like you to know about driving:     Here are some warning signs for impairment and dangerous drowsy driving:              -Having been awake more than 16 hours               -Looking tired               -Eyelid drooping              -Head nodding (it could be too late at this point)              -Driving for more than 30 minutes     Some things you could do to make the driving safer if you are experiencing some drowsiness:              -Stop driving and rest              -Call for transportation              -Make sure your sleep disorder is adequately treated     Some things that have been shown NOT to work when experiencing drowsiness while driving:              -Turning on the radio              -Opening windows              -Eating any  distracting  /  entertaining  foods (e.g., sunflower seeds, candy, or any other)              -Talking on the phone      Your decision may not only impact your life, but also the life of others. Please, remember to drive safe for yourself and all of us.         Your BMI is Body mass index is 27.82 kg/m .    What is BMI?  Body mass index (BMI) is one way to tell whether you are at a healthy weight,  overweight, or obese. It measures your weight in relation to your height.  A BMI of 18.5 to 24.9 is in the healthy range. A person with a BMI of 25 to 29.9 is considered overweight, and someone with a BMI of 30 or greater is considered obese.  Another way to find out if you are at risk for health problems caused by overweight and obesity is to measure your waist. If you are a woman and your waist is more than 35 inches, or if you are a man and your waist is more than 40 inches, your risk of disease may be higher.  More than two-thirds of American adults are considered overweight or obese. Being overweight or obese increases the risk for further weight gain.  Excess weight may lead to heart disease and diabetes. Creating and following plans for healthy eating and physical activity may help you improve your health.    Methods for maintaining or losing weight.  Weight control is part of healthy lifestyle and includes exercise, emotional health, and healthy eating habits.  Careful eating habits lifelong is the mainstay of weight control.  Though there are significant health benefits from weight loss, long-term weight loss with diet alone may be very difficult to achieve- studies show long-term success with dietary management in less than 10% of people. Attaining a healthy weight may be especially difficult to achieve in those with severe obesity. In some cases, medications, devices and surgical management might be considered.    What can you do?  If you are overweight or obese and are interested in methods for weight loss, you should discuss this with your provider. In addition, we recommend that you review healthy life styles and methods for weight loss available through the National Institutes of Health patient information sites:   http://win.niddk.nih.gov/publications/index.htm

## 2024-03-14 NOTE — PROGRESS NOTES
Chief complaint: Consultation requested by Raymond Gomez MD for evaluation of possible sleep disordered breathing in the setting of new onset atrial fibrillation    Comprehensive sleep medicine questionnaire reviewed    History of Present Illness: 73-year-old female with past medical history of hypertension has had new onset atrial fibrillation.  She relates she is in sinus rhythm now.  She admits to a fair amount of stress due to recent diagnosis of mental illness in one of her children.  Otherwise, she feels that she sleeps reasonably well.  She denies significant difficulties initiating sleep.  She often will wake up around 4 AM to go to the bathroom.  Sometimes she has difficulty returning to sleep afterwards.  Her sleep is not routinely witnessed.  She denies waking up with shortness of breath or gasping or choking at this time.  She does recall being on a medical mission and being told about loud snoring when she was sleeping on her back.  Most recently when she traveled with friends they did not have significant complaints.  She has a fitness watch that does detect oxygen saturations.  She does report some oxygen saturations in the 80s.    She has had some decreased exercise tolerance recently.  She denies shortness of breath, chest tightness or wheezing during the day.  Remote smoking history.    She denies symptoms of restless legs, sleepwalking, sleep talking or dream enactment behavior.  She is not taking any sleep aids.  She will have a glass of wine or 2 most days of the week but not all.  She will typically have 2 to 3 cups of coffee in the morning.  She is not routinely taking naps.    [place] Sleepiness Scale  ESS 5  (Less than 10 normal)    Insomnia Severity Scale  COY 15  Total score categories:  0-7 = No clinically significant insomnia   8-14 = Subthreshold insomnia   15-21 = Clinical insomnia (moderate severity)  22-28 = Clinical insomnia (severe)    STOP-BANG  Loud Snore   0-1  Excessively  Tired/Sleepy   0  Observed apnea   0  Hypertension   1  BMI> 35 kg/m2   0  Age >50   1  Neck >16 in/40cm   0  Male Gender   0  Total =   2-3  (0-2 low, 3-4 intermediate, 5-8 high risk of BRANDON)      Past Medical History:   Diagnosis Date    Anxiety     Arthritis     Hyperlipidemia     Hypertension     Impaired fasting glucose     Osteopenia        Allergies   Allergen Reactions    Zetia [Ezetimibe] Muscle Pain (Myalgia)       Current Outpatient Medications   Medication    acetaminophen (TYLENOL) 325 MG tablet    amLODIPine (NORVASC) 2.5 MG tablet    apixaban ANTICOAGULANT (ELIQUIS ANTICOAGULANT) 5 MG tablet    calcium carbonate (TUMS) 500 MG chewable tablet    cholecalciferol (VITAMIN D3) 125 mcg (5000 units) capsule    Coenzyme Q10 (COQ-10 PO)    diphenhydrAMINE (BENADRYL) 25 MG tablet    Glucosamine HCl (GLUCOSAMINE PO)    losartan-hydrochlorothiazide (HYZAAR) 100-25 MG tablet    metoprolol succinate ER (TOPROL XL) 50 MG 24 hr tablet    Multiple Vitamins-Minerals (PRESERVISION AREDS 2) CAPS    Omega-3 Fatty Acids (FISH OIL PO)    rosuvastatin (CRESTOR) 10 MG tablet    sertraline (ZOLOFT) 100 MG tablet     No current facility-administered medications for this visit.       Social History     Socioeconomic History    Marital status:      Spouse name: Not on file    Number of children: Not on file    Years of education: Not on file    Highest education level: Not on file   Occupational History    Not on file   Tobacco Use    Smoking status: Former     Packs/day: 0.75     Years: 15.00     Additional pack years: 0.00     Total pack years: 11.25     Types: Cigarettes     Start date:      Quit date:      Years since quittin.2    Smokeless tobacco: Never   Vaping Use    Vaping Use: Never used   Substance and Sexual Activity    Alcohol use: Yes     Comment: 1-2 glasses wine, 3-4 days week    Drug use: Not Currently    Sexual activity: Not on file   Other Topics Concern    Parent/sibling w/ CABG, MI or  "angioplasty before 65F 55M? Not Asked   Social History Narrative    Not on file     Social Determinants of Health     Financial Resource Strain: Not on file   Food Insecurity: Not on file   Transportation Needs: Not on file   Physical Activity: Not on file   Stress: Not on file   Social Connections: Not on file   Interpersonal Safety: Not on file   Housing Stability: Not on file       Family History   Problem Relation Age of Onset    Hypertension Mother     Aneurysm Mother     Myocardial Infarction Father     Atrial fibrillation Sister          EXAM:  /73   Pulse 67   Ht 1.626 m (5' 4\")   Wt 73.5 kg (162 lb 1.6 oz)   SpO2 99%   BMI 27.82 kg/m    GENERAL: Alert and no distress  EYES: Eyes grossly normal to inspection.  No discharge or erythema, or obvious scleral/conjunctiva abnormalities.  Mallampati 1, neck supple without lymphadenopathy  RESP: Clear to auscultation bilaterally no rales or wheezes  Cardiac tones are regular rate and rhythm, systolic murmur right upper sternal border  Extremities perfused without edema  SKIN: Visible skin clear. No significant rash, abnormal pigmentation or lesions.  NEURO: Cranial nerves grossly intact.  Mentation and speech appropriate for age.  PSYCH: Appropriate affect, tone, and pace of words       TSH   Date Value Ref Range Status   03/07/2024 3.68 0.30 - 4.20 uIU/mL Final         ASSESSMENT:  73-year-old female with recent onset A-fib now in normal sinus rhythm.  She is currently being monitored for paroxysms.  Overall low to possibly intermediate risk for obstructive sleep apnea.  Given that her sleep is not routinely witnessed.  She has some reports of low oxygen levels from her fitness watch however could be an accurate measurement.    PLAN:  Recommended home sleep apnea testing to evaluate for possible sleep disordered breathing.  If results borderline or indeterminate consider repeat testing in the lab.  We reviewed indications for treatment of sleep apnea, " testing options and treatment options.  I will be contacting her with results when they become available via [employer]      50 minutes spent by me on the date of the encounter doing chart review, history and exam, documentation and further activities per the note    Ernestina Sandoval M.D.  Pulmonary/Critical Care/Sleep Medicine    [facility]   Fl[address], [address]   [address] [place] [address]. [place] [address]   Appointments: [phone]    The above note was dictated using voice recognition software and may include typographical errors. Please contact the author for any clarifications.

## 2024-03-14 NOTE — LETTER
3/14/2024         RE: Inés Griggs  [address]  [facility] [address]        Dear Colleague,    Thank you for referring your patient, Inés Griggs, to the [facility] [place]. Please see a copy of my visit note below.    Chief complaint: Consultation requested by Raymond Gomez MD for evaluation of possible sleep disordered breathing in the setting of new onset atrial fibrillation    Comprehensive sleep medicine questionnaire reviewed    History of Present Illness: 73-year-old female with past medical history of hypertension has had new onset atrial fibrillation.  She relates she is in sinus rhythm now.  She admits to a fair amount of stress due to recent diagnosis of mental illness in one of her children.  Otherwise, she feels that she sleeps reasonably well.  She denies significant difficulties initiating sleep.  She often will wake up around 4 AM to go to the bathroom.  Sometimes she has difficulty returning to sleep afterwards.  Her sleep is not routinely witnessed.  She denies waking up with shortness of breath or gasping or choking at this time.  She does recall being on a medical mission and being told about loud snoring when she was sleeping on her back.  Most recently when she traveled with friends they did not have significant complaints.  She has a fitness watch that does detect oxygen saturations.  She does report some oxygen saturations in the 80s.    She has had some decreased exercise tolerance recently.  She denies shortness of breath, chest tightness or wheezing during the day.  Remote smoking history.    She denies symptoms of restless legs, sleepwalking, sleep talking or dream enactment behavior.  She is not taking any sleep aids.  She will have a glass of wine or 2 most days of the week but not all.  She will typically have 2 to 3 cups of coffee in the morning.  She is not routinely taking naps.    [place] Sleepiness Scale  ESS 5  (Less than 10  normal)    Insomnia Severity Scale  COY 15  Total score categories:  0-7 = No clinically significant insomnia   8-14 = Subthreshold insomnia   15-21 = Clinical insomnia (moderate severity)  22-28 = Clinical insomnia (severe)    STOP-BANG  Loud Snore   0-1  Excessively Tired/Sleepy   0  Observed apnea   0  Hypertension   1  BMI> 35 kg/m2   0  Age >50   1  Neck >16 in/40cm   0  Male Gender   0  Total =   2-3  (0-2 low, 3-4 intermediate, 5-8 high risk of BRANDON)      Past Medical History:   Diagnosis Date     Anxiety      Arthritis      Hyperlipidemia      Hypertension      Impaired fasting glucose      Osteopenia        Allergies   Allergen Reactions     Zetia [Ezetimibe] Muscle Pain (Myalgia)       Current Outpatient Medications   Medication     acetaminophen (TYLENOL) 325 MG tablet     amLODIPine (NORVASC) 2.5 MG tablet     apixaban ANTICOAGULANT (ELIQUIS ANTICOAGULANT) 5 MG tablet     calcium carbonate (TUMS) 500 MG chewable tablet     cholecalciferol (VITAMIN D3) 125 mcg (5000 units) capsule     Coenzyme Q10 (COQ-10 PO)     diphenhydrAMINE (BENADRYL) 25 MG tablet     Glucosamine HCl (GLUCOSAMINE PO)     losartan-hydrochlorothiazide (HYZAAR) 100-25 MG tablet     metoprolol succinate ER (TOPROL XL) 50 MG 24 hr tablet     Multiple Vitamins-Minerals (PRESERVISION AREDS 2) CAPS     Omega-3 Fatty Acids (FISH OIL PO)     rosuvastatin (CRESTOR) 10 MG tablet     sertraline (ZOLOFT) 100 MG tablet     No current facility-administered medications for this visit.       Social History     Socioeconomic History     Marital status:      Spouse name: Not on file     Number of children: Not on file     Years of education: Not on file     Highest education level: Not on file   Occupational History     Not on file   Tobacco Use     Smoking status: Former     Packs/day: 0.75     Years: 15.00     Additional pack years: 0.00     Total pack years: 11.25     Types: Cigarettes     Start date: 1966     Quit date: 1983     Years since  "quittin.2     Smokeless tobacco: Never   Vaping Use     Vaping Use: Never used   Substance and Sexual Activity     Alcohol use: Yes     Comment: 1-2 glasses wine, 3-4 days week     Drug use: Not Currently     Sexual activity: Not on file   Other Topics Concern     Parent/sibling w/ CABG, MI or angioplasty before 65F 55M? Not Asked   Social History Narrative     Not on file     Social Determinants of Health     Financial Resource Strain: Not on file   Food Insecurity: Not on file   Transportation Needs: Not on file   Physical Activity: Not on file   Stress: Not on file   Social Connections: Not on file   Interpersonal Safety: Not on file   Housing Stability: Not on file       Family History   Problem Relation Age of Onset     Hypertension Mother      Aneurysm Mother      Myocardial Infarction Father      Atrial fibrillation Sister          EXAM:  /73   Pulse 67   Ht 1.626 m (5' 4\")   Wt 73.5 kg (162 lb 1.6 oz)   SpO2 99%   BMI 27.82 kg/m    GENERAL: Alert and no distress  EYES: Eyes grossly normal to inspection.  No discharge or erythema, or obvious scleral/conjunctiva abnormalities.  Mallampati 1, neck supple without lymphadenopathy  RESP: Clear to auscultation bilaterally no rales or wheezes  Cardiac tones are regular rate and rhythm, systolic murmur right upper sternal border  Extremities perfused without edema  SKIN: Visible skin clear. No significant rash, abnormal pigmentation or lesions.  NEURO: Cranial nerves grossly intact.  Mentation and speech appropriate for age.  PSYCH: Appropriate affect, tone, and pace of words       TSH   Date Value Ref Range Status   2024 3.68 0.30 - 4.20 uIU/mL Final         ASSESSMENT:  73-year-old female with recent onset A-fib now in normal sinus rhythm.  She is currently being monitored for paroxysms.  Overall low to possibly intermediate risk for obstructive sleep apnea.  Given that her sleep is not routinely witnessed.  She has some reports of low oxygen " levels from her fitness watch however could be an accurate measurement.    PLAN:  Recommended home sleep apnea testing to evaluate for possible sleep disordered breathing.  If results borderline or indeterminate consider repeat testing in the lab.  We reviewed indications for treatment of sleep apnea, testing options and treatment options.  I will be contacting her with results when they become available via [employer]      50 minutes spent by me on the date of the encounter doing chart review, history and exam, documentation and further activities per the note    Ernestina Sandoval M.D.  Pulmonary/Critical Care/Sleep Medicine    [facility]   [address], [address]   [address] [place] [address]. [place] [address]   Appointments: [phone]    The above note was dictated using voice recognition software and may include typographical errors. Please contact the author for any clarifications.              Again, thank you for allowing me to participate in the care of your patient.        Sincerely,        Ernestina Sandoval MD

## 2024-03-15 DIAGNOSIS — E78.2 MIXED HYPERLIPIDEMIA: Primary | ICD-10-CM

## 2024-03-15 NOTE — PROGRESS NOTES
RN received update that there was an issue in transportation with the specimen for the FLP labs and that it would need to be re-drawn. RN placed orders for new FLP lab, called patient and left detailed VM advising the situation and that labs would need to be re-drawn and sent message to billing team to not charge patient for second venipuncture. RN advised patient in VM to call this RN back with an questions.

## 2024-03-25 PROCEDURE — 93244 EXT ECG>48HR<7D REV&INTERPJ: CPT | Performed by: INTERNAL MEDICINE

## 2024-04-17 NOTE — PROGRESS NOTES
Cardiology Clinic Progress Note  Inés Griggs MRN# [id number]   YOB: 1950 Age: 74 year old   Primary Cardiologist: Dr. Gomez  Reason for visit: Follow up testing/atrial fibrillation             Assessment and Plan:       1.  Paroxysmal atrial fibrillation, [id number] 4  -3/2024 Zio patch monitor showed no recurrent atrial fibrillation  -Anticoagulated with Eliquis    2.  Coronary artery disease  -2022 Calcium score of 604, placing patient in the 93rd percentile with subsequent negative exercise nuclear stress test  -No anginal symptoms    3.  Hypertension, poorly controlled  -On multidrug regimen  -No longer on lisinopril as it caused her cough    4.  Dyslipidemia  -3/2024 lipid panel with total cholesterol 161, HDL 53, LDL 85, triglycerides 115    Plan:  1.  Increase rosuvastatin to 20 mg daily given LDL continues to be above goal  2.  Increase amlodipine to 5 mg daily  3.  Will continue metoprolol XL at 50 mg daily as her atrial fibrillation has had minimal recurrence and her blood pressure continues to be poorly controlled, however she would like to consider reducing this in the future once her blood pressure is better controlled  4.  Could consider renal artery ultrasound and 24-hour blood pressure monitor at next visit, pending continued difficulty with hypertensive control  5.  Recommended reduction in her sodium intake, restricting to 2 g/day as well as dietary changes including reducing fast food and processed food intake    Follow up plan: Follow-up with me in 2 months        History of Presenting Illness:    Inés Griggs is a very pleasant 74 year old female with a history of paroxysmal atrial fibrillation, medically managed coronary artery hypertension, dyslipidemia.    Patient was seen by Dr. Gomez in March 2024 to establish care.  Prior to their visit he did have an echocardiogram done on 3/5/2024 which showed normal ejection fraction, no regional wall motion  abnormalities, normal RV size and function, moderate left atrial dilation, and 1+ MR and TR.  At that visit she was not having any anginal symptoms and was notably in a sinus rhythm.  He discontinued her aspirin and kept her on Eliquis for anticoagulation.  She wore a Zio patch monitor which showed no recurrent atrial fibrillation or other significant dysrhythmia. Her lowest HR was 42bpm while sleeping and average 61bpm.  He also discontinued her amlodipine-benazepril combo pill as she was already taking an ARB (losartan-HCTZ).  She was continued on amlodipine 2.5 mg daily alone.    Patient is here today for further review. Her fatigue is improved since her last visit. She continues to feel like she is dealing with a lot of personal stressors-including dealing with ongoing mental health issues with her son and her dog passing away.  She is working with her primary care provider on managing her stress and anxiety.    She felt one further episode of atrial fibrillation lasting 1 day since her last clinic visit, during which she felt fatigued but no other symptoms. Her blood pressures at home have been elevated recently, spiking to 200/100.  She does check it with a manual cuff.    Patient denies chest pain or chest tightness. Denies dizziness, lightheadedness or other presyncopal symptoms. Denies tachycardia or palpitations.     She denies any issues with myalgias while taking her rosuvastatin, she takes co-Q10 which has been helpful.  Denies any issues with bleeding.    Labs from 3/7/24 sodium 143, potassium 4.2, BUN 16.3, creatinine 0.69, GFR greater than 60, TSH 3.68, hemoglobin 11.9, platelet 344, 4/22/2024 lipid panel with total cholesterol 161, HDL 53, LDL 85, triglycerides 115, ALT 35.    Blood pressure 164/82 and HR 67 in clinic today.  She has been compliant with taking her medications and has no issues affording her medications.    She has not been exercising as much lately, she is going pole hiking later  "today. She bikes year round and also kayaks in the summer. She typically cross country skis in the winter and did this winter. She says her diet is \"bad\". She tries to eat fish weekly, but will often eat pizza and large breakfasts with her friends. Occasionally will get fast food as well. Drinks 2 glasses of wine a few times per week. She is a former smoker.         Recent Hospitalizations   None in            Social History      Social History     Socioeconomic History    Marital status:      Spouse name: Not on file    Number of children: Not on file    Years of education: Not on file    Highest education level: Not on file   Occupational History    Not on file   Tobacco Use    Smoking status: Former     Current packs/day: 0.00     Average packs/day: 0.8 packs/day for 17.0 years (12.8 ttl pk-yrs)     Types: Cigarettes     Start date:      Quit date:      Years since quittin.3    Smokeless tobacco: Never   Vaping Use    Vaping status: Never Used   Substance and Sexual Activity    Alcohol use: Yes     Alcohol/week: 10.0 standard drinks of alcohol     Types: 10 Glasses of wine per week     Comment: 1-2 glasses wine, 3-4 days week    Drug use: Not Currently    Sexual activity: Not on file   Other Topics Concern    Parent/sibling w/ CABG, MI or angioplasty before 65F 55M? Not Asked   Social History Narrative    Not on file     Social Determinants of Health     Financial Resource Strain: Not on file   Food Insecurity: Not on file   Transportation Needs: Not on file   Physical Activity: Not on file   Stress: Not on file   Social Connections: Not on file   Interpersonal Safety: Not on file   Housing Stability: Not on file            Review of Systems:   Skin:  not assessed     Eyes:  not assessed    ENT:  not assessed    Respiratory:  Negative    Cardiovascular:    Positive for;palpitations;fatigue  Gastroenterology: not assessed    Genitourinary:  not assessed    Musculoskeletal:  not assessed  " "  Neurologic:  not assessed    Psychiatric:  not assessed    Heme/Lymph/Imm:  not assessed    Endocrine:  not assessed           Physical Exam:   Vitals: BP (!) 180/88   Pulse 67   Ht 1.626 m (5' 4\")   Wt 74.1 kg (163 lb 6.4 oz)   SpO2 97%   BMI 28.05 kg/m     Wt Readings from Last 4 Encounters:   04/25/24 74.1 kg (163 lb 6.4 oz)   03/14/24 73.5 kg (162 lb 1.6 oz)   03/06/24 75.8 kg (167 lb)   08/18/22 76.5 kg (168 lb 9.6 oz)     GEN: well nourished, in no acute distress.  HEENT:  Pupils equal, round. Sclerae nonicteric.   NECK: Supple, no masses appreciated.   C/V:  Regular rate and rhythm, no murmur, rub or gallop.  Occasional ectopic beat  RESP: Respirations are unlabored. Clear to auscultation bilaterally without wheezing, rales, or rhonchi.  GI: Abdomen soft, nontender.  EXTREM: No LE edema.  NEURO: Alert and oriented, cooperative.  SKIN: Warm and dry.        Data:       LIPID RESULTS:  Lab Results   Component Value Date    CHOL 161 04/22/2024    HDL 53 04/22/2024    LDL 85 04/22/2024    TRIG 115 04/22/2024    TRIG 99 07/15/2022     LIVER ENZYME RESULTS:  Lab Results   Component Value Date    AST 23 03/07/2024    ALT 35 03/07/2024     CBC RESULTS:  Lab Results   Component Value Date    WBC 7.7 03/07/2024    RBC 3.61 (L) 03/07/2024    HGB 11.9 03/07/2024    HGB 11.2 (L) 05/28/2020    HCT 36.5 03/07/2024     (H) 03/07/2024    MCH 33.0 03/07/2024    MCHC 32.6 03/07/2024    RDW 12.1 03/07/2024     03/07/2024     BMP RESULTS:  Lab Results   Component Value Date     03/07/2024     05/28/2020    POTASSIUM 4.2 03/07/2024    POTASSIUM 3.8 07/21/2021    POTASSIUM 3.5 05/28/2020    CHLORIDE 105 03/07/2024    CHLORIDE 103 07/15/2022    CHLORIDE 111 (H) 05/28/2020    CO2 24 03/07/2024    CO2 23 05/28/2020    ANIONGAP 14 03/07/2024    ANIONGAP 6 05/28/2020     (H) 03/07/2024     (H) 05/28/2020    BUN 16.3 03/07/2024    BUN 15 05/28/2020    CR 0.69 03/07/2024    CR 0.64 05/28/2020 " "   GFRESTIMATED >90 03/07/2024    GFRESTIMATED >90 05/28/2020    GFRESTBLACK >90 05/28/2020    NADJA 9.8 03/07/2024    NADJA 8.3 (L) 05/28/2020      A1C RESULTS:  No results found for: \"A1C\"  INR RESULTS:  No results found for: \"INR\"         Medications     Current Outpatient Medications   Medication Sig Dispense Refill    acetaminophen (TYLENOL) 325 MG tablet Take 3 tablets (975 mg) by mouth 4 times daily as needed for other (mild pain) 100 tablet 0    amLODIPine (NORVASC) 5 MG tablet Take 1 tablet (5 mg) by mouth daily 90 tablet 1    apixaban ANTICOAGULANT (ELIQUIS ANTICOAGULANT) 5 MG tablet Take 1 tablet (5 mg) by mouth 2 times daily 180 tablet 3    calcium carbonate (TUMS) 500 MG chewable tablet Take 1 chew tab by mouth 2 times daily as needed for heartburn      cholecalciferol (VITAMIN D3) 125 mcg (5000 units) capsule       Coenzyme Q10 (COQ-10 PO) Take 1 tablet by mouth every other day      diphenhydrAMINE (BENADRYL) 25 MG tablet Take 25 mg by mouth nightly as needed for itching or allergies      Glucosamine HCl (GLUCOSAMINE PO) Take 1 tablet by mouth daily as needed      losartan-hydrochlorothiazide (HYZAAR) 100-25 MG tablet Take 1 tablet by mouth every morning      metoprolol succinate ER (TOPROL XL) 50 MG 24 hr tablet Take 1 tablet (50 mg) by mouth daily 90 tablet 3    Multiple Vitamins-Minerals (PRESERVISION AREDS 2) CAPS Take by mouth 2 times daily      Omega-3 Fatty Acids (FISH OIL PO) Take 2 capsules by mouth daily      rosuvastatin (CRESTOR) 10 MG tablet Take 20 mg by mouth daily      sertraline (ZOLOFT) 100 MG tablet Take 100 mg by mouth daily            Past Medical History     Past Medical History:   Diagnosis Date    Anxiety     Arthritis     Hyperlipidemia     Hypertension     Impaired fasting glucose     Osteopenia      Past Surgical History:   Procedure Laterality Date    ARTHROPLASTY HIP ANTERIOR Right 05/27/2020    Procedure: RIGHT TOTAL HIP ARTHROPLASTY DIRECT ANTERIOR APPROACH;  Surgeon: Meghan " Usama Keating MD;  Location: [facility] OR    ARTHROPLASTY KNEE Left 07/21/2021    Procedure: left total knee arthroplasty;  Surgeon: Usama Christianson MD;  Location: [facility] OR    [facility] AFF SURGICAL PATHOLOGY      benign    ENT SURGERY Right     parotid ectomy, in 30s    GYN SURGERY      c section    ORTHOPEDIC SURGERY Right 2020    RT     Family History   Problem Relation Age of Onset    Hypertension Mother     Aneurysm Mother     Myocardial Infarction Father     Atrial fibrillation Sister     Heart Failure Sister     Sleep Apnea Daughter     Bipolar Disorder Son             Allergies   Zetia [[email or web address]]        Graciela Sánchez NP  [facility]  Pager: [phone]

## 2024-04-18 ASSESSMENT — SLEEP AND FATIGUE QUESTIONNAIRES
HOW LIKELY ARE YOU TO NOD OFF OR FALL ASLEEP WHILE SITTING QUIETLY AFTER LUNCH WITHOUT ALCOHOL: SLIGHT CHANCE OF DOZING
HOW LIKELY ARE YOU TO NOD OFF OR FALL ASLEEP WHILE LYING DOWN TO REST IN THE AFTERNOON WHEN CIRCUMSTANCES PERMIT: MODERATE CHANCE OF DOZING
HOW LIKELY ARE YOU TO NOD OFF OR FALL ASLEEP WHILE SITTING INACTIVE IN A PUBLIC PLACE: SLIGHT CHANCE OF DOZING
HOW LIKELY ARE YOU TO NOD OFF OR FALL ASLEEP WHEN YOU ARE A PASSENGER IN A CAR FOR AN HOUR WITHOUT A BREAK: WOULD NEVER DOZE
HOW LIKELY ARE YOU TO NOD OFF OR FALL ASLEEP WHILE SITTING AND READING: SLIGHT CHANCE OF DOZING
HOW LIKELY ARE YOU TO NOD OFF OR FALL ASLEEP WHILE WATCHING TV: SLIGHT CHANCE OF DOZING
HOW LIKELY ARE YOU TO NOD OFF OR FALL ASLEEP WHILE SITTING AND TALKING TO SOMEONE: WOULD NEVER DOZE
HOW LIKELY ARE YOU TO NOD OFF OR FALL ASLEEP IN A CAR, WHILE STOPPED FOR A FEW MINUTES IN TRAFFIC: WOULD NEVER DOZE

## 2024-04-22 ENCOUNTER — LAB (OUTPATIENT)
Dept: LAB | Facility: CLINIC | Age: 74
End: 2024-04-22
Payer: COMMERCIAL

## 2024-04-22 DIAGNOSIS — E78.2 MIXED HYPERLIPIDEMIA: ICD-10-CM

## 2024-04-22 LAB
CHOLEST SERPL-MCNC: 161 MG/DL
FASTING STATUS PATIENT QL REPORTED: YES
HDLC SERPL-MCNC: 53 MG/DL
LDLC SERPL CALC-MCNC: 85 MG/DL
NONHDLC SERPL-MCNC: 108 MG/DL
TRIGL SERPL-MCNC: 115 MG/DL

## 2024-04-22 PROCEDURE — 36415 COLL VENOUS BLD VENIPUNCTURE: CPT | Performed by: INTERNAL MEDICINE

## 2024-04-22 PROCEDURE — 80061 LIPID PANEL: CPT | Performed by: INTERNAL MEDICINE

## 2024-04-23 ENCOUNTER — NURSE TRIAGE (OUTPATIENT)
Dept: CARDIOLOGY | Facility: CLINIC | Age: 74
End: 2024-04-23

## 2024-04-23 ENCOUNTER — OFFICE VISIT (OUTPATIENT)
Dept: SLEEP MEDICINE | Facility: CLINIC | Age: 74
End: 2024-04-23
Payer: COMMERCIAL

## 2024-04-23 ENCOUNTER — TELEPHONE (OUTPATIENT)
Dept: CARDIOLOGY | Facility: CLINIC | Age: 74
End: 2024-04-23

## 2024-04-23 DIAGNOSIS — Z91.89 RISK FACTORS FOR OBSTRUCTIVE SLEEP APNEA: ICD-10-CM

## 2024-04-23 DIAGNOSIS — I48.91 NEW ONSET A-FIB (H): ICD-10-CM

## 2024-04-23 DIAGNOSIS — G47.34 NOCTURNAL HYPOXEMIA: ICD-10-CM

## 2024-04-23 DIAGNOSIS — G47.33 OSA (OBSTRUCTIVE SLEEP APNEA): Primary | ICD-10-CM

## 2024-04-23 DIAGNOSIS — R06.83 SNORING: ICD-10-CM

## 2024-04-23 PROCEDURE — 95800 SLP STDY UNATTENDED: CPT | Performed by: INTERNAL MEDICINE

## 2024-04-23 NOTE — TELEPHONE ENCOUNTER
Caller reporting the following red-flag symptom(s): high Blood pressures 180//100     Per the system red-flag symptom policy, patient was instructed to:  speak with a Registered Nurse    Action:  Patient warm transferred to a Registered Nurse      Pt has appt already with Gonzalo on 4/25/24@0800

## 2024-04-23 NOTE — TELEPHONE ENCOUNTER
"Patient was transferred from [facility] to speak to Triage regarding high blood pressure.  Patient states she was diagnosed with A-Fib in March. She had three episodes a week apart. She is on metoprolol succinate and Eliquis. She says her heart rates have went down to the 50's most of the time but her blood pressure has gone up. She reports the night before last was 180/80 and this morning it is 190/100 HR 61. She says her blood pressure has gone as high as 200/100. She says she feels fine other than tired and having some fatigue. She thinks her A-Fib was stress-related and has xanax. She took a dose last night but says it does not help. She has not taken any medications yet this morning. She states she takes amlodipine and losartan/hydrochlorothiazide in the evening and metoprolol in the AM. Patient has an appointment to see Parvin Sánchez 4/25/24.   Advised this will be routed to the nurses with Dr. Gomez for further review/follow-up. She verbalized understanding.     1. BLOOD PRESSURE: \"What is the blood pressure?\" \"Did you take at least two measurements 5 minutes apart?\" 190/100 this morning. 180/80 the night before last.  2. ONSET: \"When did you take your blood pressure?\" Noted above.   3. HOW: \"How did you take your blood pressure?\" (e.g., automatic home BP monitor, visiting nurse) Home BP monitor.   4. HISTORY: \"Do you have a history of high blood pressure?\" Yes.  5. MEDICINES: \"Are you taking any medicines for blood pressure?\" \"Have you missed any doses recently?\" Patient taking amlodipine, losartan-hydrochlorothiazide, metoprolol succinate.  6. OTHER SYMPTOMS: \"Do you have any symptoms?\" (e.g., blurred vision, chest pain, difficulty breathing, headache, weakness) Fatigue, tired.     Additional Information   Negative: Systolic BP >= 160 OR Diastolic >= 100, and any cardiac (e.g., breathing difficulty, chest pain) or neurologic symptoms (e.g., new-onset blurred or double vision)   Negative: Systolic BP >= 200 OR " Diastolic >= 120 and having NO cardiac or neurologic symptoms    Protocols used: Blood Pressure - High-A-[place]

## 2024-04-23 NOTE — PROGRESS NOTES
Pt is completing a home sleep test. Pt was instructed on how to put on the Noxturnal T3 device and associated equipment before going to bed and given the opportunity to practice putting it on before leaving the sleep center. Pt was reminded to bring the home sleep test kit back to the center tomorrow, at agreed upon time for download and reporting.   Neck circumference: 38 CM / 15 inches.  Jayla Gaviria CMA on 4/23/2024 at 2:53 PM

## 2024-04-24 ENCOUNTER — DOCUMENTATION ONLY (OUTPATIENT)
Dept: SLEEP MEDICINE | Facility: CLINIC | Age: 74
End: 2024-04-24
Payer: COMMERCIAL

## 2024-04-24 NOTE — NURSING NOTE
Pt returned HST device. It was downloaded and forwarded data to the clinical specialist for scoring.   Jayla Gaviria CMA on 4/24/2024 at 10:40 AM

## 2024-04-24 NOTE — PROGRESS NOTES
[facility]ST POST-STUDY QUESTIONNAIRE    What time did you go to bed?  10:30 pm  How long do you think it took to fall asleep?  1/2 hour  What time did you wake up to start the day?  8:00 am  Did you get up during the night at all?  Yes  If you woke up, do you remember approximately what time(s)? 3 and 5:30 am ?  Did you have any difficulty with the equipment?  No  Did you us any type of treatment with this study?  None  Was the head of the bed elevated? No  Did you sleep in a recliner?  No  Did you stop using CPAP at least 3 days before this test?  NA  Any other information you'd like us to know? Abdominal strap slide up to chest during sleep.

## 2024-04-25 ENCOUNTER — OFFICE VISIT (OUTPATIENT)
Dept: CARDIOLOGY | Facility: CLINIC | Age: 74
End: 2024-04-25
Attending: INTERNAL MEDICINE
Payer: COMMERCIAL

## 2024-04-25 VITALS
HEART RATE: 67 BPM | WEIGHT: 163.4 LBS | BODY MASS INDEX: 27.9 KG/M2 | HEIGHT: 64 IN | DIASTOLIC BLOOD PRESSURE: 88 MMHG | SYSTOLIC BLOOD PRESSURE: 180 MMHG | OXYGEN SATURATION: 97 %

## 2024-04-25 DIAGNOSIS — I48.91 NEW ONSET A-FIB (H): ICD-10-CM

## 2024-04-25 DIAGNOSIS — I25.10 CORONARY ARTERY DISEASE INVOLVING NATIVE CORONARY ARTERY OF NATIVE HEART WITHOUT ANGINA PECTORIS: ICD-10-CM

## 2024-04-25 DIAGNOSIS — I10 ESSENTIAL HYPERTENSION: ICD-10-CM

## 2024-04-25 PROCEDURE — 99214 OFFICE O/P EST MOD 30 MIN: CPT | Performed by: NURSE PRACTITIONER

## 2024-04-25 RX ORDER — AMLODIPINE BESYLATE 5 MG/1
5 TABLET ORAL DAILY
Qty: 90 TABLET | Refills: 1 | Status: SHIPPED | OUTPATIENT
Start: 2024-04-25

## 2024-04-25 NOTE — PATIENT INSTRUCTIONS
Today's Recommendations    I recommend you work on moderating your salt intake to less than 2gm/day and alcohol intake   INCREASE your amlodipine to 5mg daily (you can use up your amlodipine 2.5mg by taking 2 tablets)  INCREASE your rosuvastatin to 20mg daily   Continue all other medications without changes.  Please follow up with me in 2 months.    Please send a [employer] message or call [phone] to the RN team with questions or concerns.     Scheduling number [phone]  PORTILLO Noel, CNP

## 2024-04-25 NOTE — LETTER
4/25/2024    Pilar Pop MD  [address]  [facility] [address]    RE: Inés Griggs       Dear Colleague,     I had the pleasure of seeing Inés Griggs in the [facility].    Cardiology Clinic Progress Note  Inés Griggs MRN# [id number]   YOB: 1950 Age: 74 year old   Primary Cardiologist: Dr. Gomez  Reason for visit: Follow up testing/atrial fibrillation             Assessment and Plan:       1.  Paroxysmal atrial fibrillation, [id number] 4  -3/2024 Zio patch monitor showed no recurrent atrial fibrillation  -Anticoagulated with Eliquis    2.  Coronary artery disease  -2022 Calcium score of 604, placing patient in the 93rd percentile with subsequent negative exercise nuclear stress test  -No anginal symptoms    3.  Hypertension, poorly controlled  -On multidrug regimen  -No longer on lisinopril as it caused her cough    4.  Dyslipidemia  -3/2024 lipid panel with total cholesterol 161, HDL 53, LDL 85, triglycerides 115    Plan:  1.  Increase rosuvastatin to 20 mg daily given LDL continues to be above goal  2.  Increase amlodipine to 5 mg daily  3.  Will continue metoprolol XL at 50 mg daily as her atrial fibrillation has had minimal recurrence and her blood pressure continues to be poorly controlled, however she would like to consider reducing this in the future once her blood pressure is better controlled  4.  Could consider renal artery ultrasound and 24-hour blood pressure monitor at next visit, pending continued difficulty with hypertensive control  5.  Recommended reduction in her sodium intake, restricting to 2 g/day as well as dietary changes including reducing fast food and processed food intake    Follow up plan: Follow-up with me in 2 months        History of Presenting Illness:    Inés Griggs is a very pleasant 74 year old female with a history of paroxysmal atrial fibrillation, medically managed coronary artery hypertension,  dyslipidemia.    Patient was seen by Dr. Gomez in March 2024 to establish care.  Prior to their visit he did have an echocardiogram done on 3/5/2024 which showed normal ejection fraction, no regional wall motion abnormalities, normal RV size and function, moderate left atrial dilation, and 1+ MR and TR.  At that visit she was not having any anginal symptoms and was notably in a sinus rhythm.  He discontinued her aspirin and kept her on Eliquis for anticoagulation.  She wore a Zio patch monitor which showed no recurrent atrial fibrillation or other significant dysrhythmia. Her lowest HR was 42bpm while sleeping and average 61bpm.  He also discontinued her amlodipine-benazepril combo pill as she was already taking an ARB (losartan-HCTZ).  She was continued on amlodipine 2.5 mg daily alone.    Patient is here today for further review. Her fatigue is improved since her last visit. She continues to feel like she is dealing with a lot of personal stressors-including dealing with ongoing mental health issues with her son and her dog passing away.  She is working with her primary care provider on managing her stress and anxiety.    She felt one further episode of atrial fibrillation lasting 1 day since her last clinic visit, during which she felt fatigued but no other symptoms. Her blood pressures at home have been elevated recently, spiking to 200/100.  She does check it with a manual cuff.    Patient denies chest pain or chest tightness. Denies dizziness, lightheadedness or other presyncopal symptoms. Denies tachycardia or palpitations.     She denies any issues with myalgias while taking her rosuvastatin, she takes co-Q10 which has been helpful.  Denies any issues with bleeding.    Labs from 3/7/24 sodium 143, potassium 4.2, BUN 16.3, creatinine 0.69, GFR greater than 60, TSH 3.68, hemoglobin 11.9, platelet 344, 4/22/2024 lipid panel with total cholesterol 161, HDL 53, LDL 85, triglycerides 115, ALT 35.    Blood  "pressure 164/82 and HR 67 in clinic today.  She has been compliant with taking her medications and has no issues affording her medications.    She has not been exercising as much lately, she is going pole hiking later today. She bikes year round and also kayaks in the summer. She typically cross country skis in the winter and did this winter. She says her diet is \"bad\". She tries to eat fish weekly, but will often eat pizza and large breakfasts with her friends. Occasionally will get fast food as well. Drinks 2 glasses of wine a few times per week. She is a former smoker.         Recent Hospitalizations   None in            Social History      Social History     Socioeconomic History    Marital status:      Spouse name: Not on file    Number of children: Not on file    Years of education: Not on file    Highest education level: Not on file   Occupational History    Not on file   Tobacco Use    Smoking status: Former     Current packs/day: 0.00     Average packs/day: 0.8 packs/day for 17.0 years (12.8 ttl pk-yrs)     Types: Cigarettes     Start date:      Quit date:      Years since quittin.3    Smokeless tobacco: Never   Vaping Use    Vaping status: Never Used   Substance and Sexual Activity    Alcohol use: Yes     Alcohol/week: 10.0 standard drinks of alcohol     Types: 10 Glasses of wine per week     Comment: 1-2 glasses wine, 3-4 days week    Drug use: Not Currently    Sexual activity: Not on file   Other Topics Concern    Parent/sibling w/ CABG, MI or angioplasty before 65F 55M? Not Asked   Social History Narrative    Not on file     Social Determinants of Health     Financial Resource Strain: Not on file   Food Insecurity: Not on file   Transportation Needs: Not on file   Physical Activity: Not on file   Stress: Not on file   Social Connections: Not on file   Interpersonal Safety: Not on file   Housing Stability: Not on file            Review of Systems:   Skin:  not assessed     Eyes:  " "not assessed    ENT:  not assessed    Respiratory:  Negative    Cardiovascular:    Positive for;palpitations;fatigue  Gastroenterology: not assessed    Genitourinary:  not assessed    Musculoskeletal:  not assessed    Neurologic:  not assessed    Psychiatric:  not assessed    Heme/Lymph/Imm:  not assessed    Endocrine:  not assessed           Physical Exam:   Vitals: BP (!) 180/88   Pulse 67   Ht 1.626 m (5' 4\")   Wt 74.1 kg (163 lb 6.4 oz)   SpO2 97%   BMI 28.05 kg/m     Wt Readings from Last 4 Encounters:   04/25/24 74.1 kg (163 lb 6.4 oz)   03/14/24 73.5 kg (162 lb 1.6 oz)   03/06/24 75.8 kg (167 lb)   08/18/22 76.5 kg (168 lb 9.6 oz)     GEN: well nourished, in no acute distress.  HEENT:  Pupils equal, round. Sclerae nonicteric.   NECK: Supple, no masses appreciated.   C/V:  Regular rate and rhythm, no murmur, rub or gallop.  Occasional ectopic beat  RESP: Respirations are unlabored. Clear to auscultation bilaterally without wheezing, rales, or rhonchi.  GI: Abdomen soft, nontender.  EXTREM: No LE edema.  NEURO: Alert and oriented, cooperative.  SKIN: Warm and dry.        Data:       LIPID RESULTS:  Lab Results   Component Value Date    CHOL 161 04/22/2024    HDL 53 04/22/2024    LDL 85 04/22/2024    TRIG 115 04/22/2024    TRIG 99 07/15/2022     LIVER ENZYME RESULTS:  Lab Results   Component Value Date    AST 23 03/07/2024    ALT 35 03/07/2024     CBC RESULTS:  Lab Results   Component Value Date    WBC 7.7 03/07/2024    RBC 3.61 (L) 03/07/2024    HGB 11.9 03/07/2024    HGB 11.2 (L) 05/28/2020    HCT 36.5 03/07/2024     (H) 03/07/2024    MCH 33.0 03/07/2024    MCHC 32.6 03/07/2024    RDW 12.1 03/07/2024     03/07/2024     BMP RESULTS:  Lab Results   Component Value Date     03/07/2024     05/28/2020    POTASSIUM 4.2 03/07/2024    POTASSIUM 3.8 07/21/2021    POTASSIUM 3.5 05/28/2020    CHLORIDE 105 03/07/2024    CHLORIDE 103 07/15/2022    CHLORIDE 111 (H) 05/28/2020    CO2 24 " "03/07/2024    CO2 23 05/28/2020    ANIONGAP 14 03/07/2024    ANIONGAP 6 05/28/2020     (H) 03/07/2024     (H) 05/28/2020    BUN 16.3 03/07/2024    BUN 15 05/28/2020    CR 0.69 03/07/2024    CR 0.64 05/28/2020    GFRESTIMATED >90 03/07/2024    GFRESTIMATED >90 05/28/2020    GFRESTBLACK >90 05/28/2020    NADJA 9.8 03/07/2024    NADJA 8.3 (L) 05/28/2020      A1C RESULTS:  No results found for: \"A1C\"  INR RESULTS:  No results found for: \"INR\"         Medications     Current Outpatient Medications   Medication Sig Dispense Refill    acetaminophen (TYLENOL) 325 MG tablet Take 3 tablets (975 mg) by mouth 4 times daily as needed for other (mild pain) 100 tablet 0    amLODIPine (NORVASC) 5 MG tablet Take 1 tablet (5 mg) by mouth daily 90 tablet 1    apixaban ANTICOAGULANT (ELIQUIS ANTICOAGULANT) 5 MG tablet Take 1 tablet (5 mg) by mouth 2 times daily 180 tablet 3    calcium carbonate (TUMS) 500 MG chewable tablet Take 1 chew tab by mouth 2 times daily as needed for heartburn      cholecalciferol (VITAMIN D3) 125 mcg (5000 units) capsule       Coenzyme Q10 (COQ-10 PO) Take 1 tablet by mouth every other day      diphenhydrAMINE (BENADRYL) 25 MG tablet Take 25 mg by mouth nightly as needed for itching or allergies      Glucosamine HCl (GLUCOSAMINE PO) Take 1 tablet by mouth daily as needed      losartan-hydrochlorothiazide (HYZAAR) 100-25 MG tablet Take 1 tablet by mouth every morning      metoprolol succinate ER (TOPROL XL) 50 MG 24 hr tablet Take 1 tablet (50 mg) by mouth daily 90 tablet 3    Multiple Vitamins-Minerals (PRESERVISION AREDS 2) CAPS Take by mouth 2 times daily      Omega-3 Fatty Acids (FISH OIL PO) Take 2 capsules by mouth daily      rosuvastatin (CRESTOR) 10 MG tablet Take 20 mg by mouth daily      sertraline (ZOLOFT) 100 MG tablet Take 100 mg by mouth daily            Past Medical History     Past Medical History:   Diagnosis Date    Anxiety     Arthritis     Hyperlipidemia     Hypertension     " Impaired fasting glucose     Osteopenia      Past Surgical History:   Procedure Laterality Date    ARTHROPLASTY HIP ANTERIOR Right 05/27/2020    Procedure: RIGHT TOTAL HIP ARTHROPLASTY DIRECT ANTERIOR APPROACH;  Surgeon: Usama Christianson MD;  Location: [facility] OR    ARTHROPLASTY KNEE Left 07/21/2021    Procedure: left total knee arthroplasty;  Surgeon: Usama Christianson MD;  Location:  OR    [facility] AFF SURGICAL PATHOLOGY      benign    ENT SURGERY Right     parotid ectomy, in 30s    GYN SURGERY      c section    ORTHOPEDIC SURGERY Right 2020    RT     Family History   Problem Relation Age of Onset    Hypertension Mother     Aneurysm Mother     Myocardial Infarction Father     Atrial fibrillation Sister     Heart Failure Sister     Sleep Apnea Daughter     Bipolar Disorder Son             Allergies   Zetia [ezetimibe]        Graciela Sánchez NP  [facility]  Pager: [phone]     Thank you for allowing me to participate in the care of your patient.      Sincerely,   Graciela Sánchez NP     [facility]  cc:   Raymond Gomez MD  [id number]

## 2024-05-09 DIAGNOSIS — E78.5 HYPERLIPIDEMIA WITH TARGET LDL LESS THAN 70: ICD-10-CM

## 2024-05-09 DIAGNOSIS — E78.2 MIXED HYPERLIPIDEMIA: ICD-10-CM

## 2024-05-09 DIAGNOSIS — I25.10 CORONARY ARTERY DISEASE INVOLVING NATIVE CORONARY ARTERY OF NATIVE HEART WITHOUT ANGINA PECTORIS: Primary | ICD-10-CM

## 2024-05-09 PROBLEM — G47.33 OSA (OBSTRUCTIVE SLEEP APNEA): Status: ACTIVE | Noted: 2024-05-09

## 2024-05-09 RX ORDER — ROSUVASTATIN CALCIUM 20 MG/1
20 TABLET, COATED ORAL DAILY
Qty: 30 TABLET | Refills: 2 | Status: SHIPPED | OUTPATIENT
Start: 2024-05-09 | End: 2024-05-10

## 2024-05-09 NOTE — PROGRESS NOTES
This HSAT was performed using a Noxturnal T3 device which recorded snore, sound, movement activity, body position, nasal pressure, oronasal thermal airflow, pulse, oximetry and both chest and abdominal respiratory effort. HSAT data was restricted to the time patient states they were in bed.     HSAT was scored using 1B 4% hypopnea rule.     AHI: 26.8  Snoring was reported as loud.  Time with SpO2 below 89% was 179 minutes.   Overall signal quality was good     Pt will follow up with sleep provider to determine appropriate therapy.     Ordering Provider, Ernestina Sandoval MD C. Oyugi, ASHLEE, RPSGT, RST System Clinical Specialist/ 5/9/2024

## 2024-05-09 NOTE — PROCEDURES
"HOME SLEEP STUDY INTERPRETATION        Patient: Inés Griggs  MRN: [id number]  YOB: 1950  Study Date: 2024  PCP/Referring Provider: Pilar Pop MD  Ordering Provider:   Ernestina Sandoval MD         Indications for Home Study: Inés Griggs is a 74 year old female with a history of paroxysmal atrial fibrillation who presents with symptoms suggestive of obstructive sleep apnea.    Estimated body mass index is 28.05 kg/m  as calculated from the following:    Height as of 24: 1.626 m (5' 4\").    Weight as of 24: 74.1 kg (163 lb 6.4 oz).  Total score - Marcus: 6 (2024  1:34 PM)  STOP-BAN/8        Data: A full night home sleep study was performed recording the standard physiologic parameters including body position, movement, sound, nasal pressure, thermal oral airflow, chest and abdominal movements with respiratory inductance plethysmography, and oxygen saturation by pulse oximetry. Pulse rate was estimated by oximetry recording. This study was considered adequate based on > 4 hours of quality oximetry and respiratory recording. As specified by the AASM Manual for the Scoring of Sleep and Associated events, version 2.3, Rule VIII.D 1B, 4% oxygen desaturation scoring for hypopneas is used as a standard of care on all home sleep apnea testing.        Analysis Time:  552 minutes        Respiration:   Sleep Associated Hypoxemia: sustained hypoxemia was present. Baseline oxygen saturation was 91%.  Time with saturation less than or equal to 88% was 95 minutes. The lowest oxygen saturation was 79%.   Snoring: Snoring was mild (5% time at average 70 dB.)  Respiratory events: The home study revealed a presence of 27 obstructive apneas and 4 mixed and central apneas. There were 216 hypopneas resulting in a combined apnea/hypopnea index [AHI] of 26.8 events per hour.  AHI was 73.6 per hour supine, 13.7 per hour prone, 17.8 per hour on left side, and 21.4 per hour on right " side.   Pattern: Excluding events noted above, respiratory rate and pattern was Normal.      Position: Percent of time spent: supine - 16%, prone - 17%, on left - 37%, on right - 30%.      Heart Rate: By pulse oximetry normal rate was noted.       Assessment:   Moderate obstructive sleep apnea.  Sleep associated hypoxemia was present.    Recommendations:  Consider auto-CPAP at 5-15 cmH2O followed by overnight oximetry, or polysomnography with full night PAP titration and TCM  Consider further cardiopulmonary evaluation of hypoxemia.  Suggest optimizing sleep hygiene and avoiding sleep deprivation.  Weight management.        Diagnosis Code(s): Obstructive Sleep Apnea G47.33, Hypoxemia G47.36    Electronically signed by: Ernestina Sandoval MD, May 9, 2024   Diplomate, American Board of Internal Medicine, Sleep Medicine

## 2024-05-10 DIAGNOSIS — I25.10 CORONARY ARTERY DISEASE INVOLVING NATIVE CORONARY ARTERY OF NATIVE HEART WITHOUT ANGINA PECTORIS: ICD-10-CM

## 2024-05-10 DIAGNOSIS — E78.5 HYPERLIPIDEMIA WITH TARGET LDL LESS THAN 70: ICD-10-CM

## 2024-05-10 DIAGNOSIS — E78.2 MIXED HYPERLIPIDEMIA: ICD-10-CM

## 2024-05-10 RX ORDER — ROSUVASTATIN CALCIUM 20 MG/1
20 TABLET, COATED ORAL DAILY
Qty: 90 TABLET | Refills: 0 | Status: SHIPPED | OUTPATIENT
Start: 2024-05-10 | End: 2024-09-10

## 2024-05-20 ENCOUNTER — MYC MEDICAL ADVICE (OUTPATIENT)
Dept: SLEEP MEDICINE | Facility: CLINIC | Age: 74
End: 2024-05-20
Payer: COMMERCIAL

## 2024-05-20 DIAGNOSIS — G47.34 NOCTURNAL HYPOXEMIA: ICD-10-CM

## 2024-05-20 DIAGNOSIS — Z87.891 FORMER SMOKER: ICD-10-CM

## 2024-05-20 DIAGNOSIS — G47.33 OSA (OBSTRUCTIVE SLEEP APNEA): Primary | ICD-10-CM

## 2024-05-24 ENCOUNTER — HOSPITAL ENCOUNTER (OUTPATIENT)
Dept: MAMMOGRAPHY | Facility: CLINIC | Age: 74
Discharge: HOME | End: 2024-05-24
Attending: FAMILY MEDICINE | Admitting: FAMILY MEDICINE
Payer: COMMERCIAL

## 2024-05-24 DIAGNOSIS — Z12.31 VISIT FOR SCREENING MAMMOGRAM: ICD-10-CM

## 2024-05-24 PROCEDURE — 77063 BREAST TOMOSYNTHESIS BI: CPT

## 2024-06-11 ENCOUNTER — DOCUMENTATION ONLY (OUTPATIENT)
Dept: SLEEP MEDICINE | Facility: CLINIC | Age: 74
End: 2024-06-11
Payer: COMMERCIAL

## 2024-06-11 DIAGNOSIS — G47.33 OBSTRUCTIVE SLEEP APNEA (ADULT) (PEDIATRIC): Primary | ICD-10-CM

## 2024-06-11 NOTE — PROGRESS NOTES
Patient was offered choice of vendor and chose [facility].  Patient Inés Griggs was set up at [facility]  on June 11, 2024. Patient received a Resmed Airsense 11 Pressures were set at  5-15 cm H2O.   Patient s ramp is 5 cm H2O for Auto and FLEX/EPR is EPR 2.  Patient received a [employer] Mask name: NUANCE PRO  Pillow mask size Standard, heated tubing and heated humidifier.  Patient has the following compliance requirements: using and visit requirements  Patient has a follow up on TBD.   Omid Felder

## 2024-06-14 ENCOUNTER — DOCUMENTATION ONLY (OUTPATIENT)
Dept: SLEEP MEDICINE | Facility: CLINIC | Age: 74
End: 2024-06-14
Payer: COMMERCIAL

## 2024-06-14 NOTE — PROGRESS NOTES
3 day Sleep therapy management telephone visit    Diagnostic AHI:    HST: 26.8          Confirmed with patient at time of call- Yes Patient is still interested in STM service       Subjective measures:  Pt reports CPAP is something she is getting sued to. She was dreaming more last night. We discussed oximetry. She has less desaturations and more REM on her smart watch. She had concern that her Oximetry readign on HST could have been faulty due to her sleeping with her hand under her mouth. I reviewed the HST and did not see any discernable artifact and the reading looked consistent with an accurate reading for a person with sleep apnea. This was discussed with pt.  Pt was given my contact information and instructed to reach out with any questions or concerns.       Order settings:  CPAP MIN CPAP MAX   5 cm H2O 15 cm H2O       Device settings:  CPAP MIN CPAP MAX EPR RESMED SOFT RESPONSE SETTING   5.0 cm  H20 15.0 cm  H20 TWO OFF       Compliance 66 %    Assessment: Nighty usage most nights over four hours     Action plan: Patient to have 14 day STM visit.     Patient has the following upcoming sleep appts:  Future Sleep Appointments         Provider Department    9/12/2024 10:00 AM (Arrive by 9:45 AM) Ernestina Sandoval MD [facility] [place]            Replacement device: No  STM ordered by provider: Yes     Total time spent on accessing and  interpreting remote patient PAP therapy data  10 minutes    Total time spent counseling, coaching  and reviewing PAP therapy data with patient  3 minutes    [id number] no                 
No

## 2024-06-19 ENCOUNTER — HOSPITAL ENCOUNTER (OUTPATIENT)
Dept: CARDIAC REHAB | Facility: CLINIC | Age: 74
Discharge: HOME | End: 2024-06-19
Attending: INTERNAL MEDICINE
Payer: COMMERCIAL

## 2024-06-19 DIAGNOSIS — Z87.891 FORMER SMOKER: ICD-10-CM

## 2024-06-19 DIAGNOSIS — G47.34 NOCTURNAL HYPOXEMIA: ICD-10-CM

## 2024-06-19 DIAGNOSIS — G47.33 OSA (OBSTRUCTIVE SLEEP APNEA): ICD-10-CM

## 2024-06-19 PROCEDURE — 94729 DIFFUSING CAPACITY: CPT

## 2024-06-19 PROCEDURE — 94375 RESPIRATORY FLOW VOLUME LOOP: CPT

## 2024-06-19 PROCEDURE — 94726 PLETHYSMOGRAPHY LUNG VOLUMES: CPT

## 2024-06-19 PROCEDURE — 94799 UNLISTED PULMONARY SVC/PX: CPT

## 2024-06-20 LAB
DLCOUNC-%PRED-PRE: 103 %
DLCOUNC-PRE: 19.39 ML/MIN/MMHG
DLCOUNC-PRED: 18.76 ML/MIN/MMHG
ERV-%PRED-PRE: 8 %
ERV-PRE: 0.08 L
ERV-PRED: 0.95 L
EXPTIME-PRE: 7.84 SEC
FEF2575-%PRED-PRE: 113 %
FEF2575-PRE: 1.9 L/SEC
FEF2575-PRED: 1.67 L/SEC
FEFMAX-%PRED-PRE: 115 %
FEFMAX-PRE: 6.22 L/SEC
FEFMAX-PRED: 5.4 L/SEC
FEV1-%PRED-PRE: 95 %
FEV1-PRE: 1.89 L
FEV1FEV6-PRE: 80 %
FEV1FEV6-PRED: 79 %
FEV1FVC-PRE: 81 %
FEV1FVC-PRED: 79 %
FEV1SVC-PRE: 77 %
FEV1SVC-PRED: 65 %
FIFMAX-PRE: 2.74 L/SEC
FRCPLETH-%PRED-PRE: 85 %
FRCPLETH-PRE: 2.32 L
FRCPLETH-PRED: 2.71 L
FVC-%PRED-PRE: 92 %
FVC-PRE: 2.35 L
FVC-PRED: 2.53 L
IC-%PRED-PRE: 122 %
IC-PRE: 2.33 L
IC-PRED: 1.9 L
MEP-PRE: 94 CMH2O
MIP-PRE: -67 CMH2O
RVPLETH-%PRED-PRE: 103 %
RVPLETH-PRE: 2.18 L
RVPLETH-PRED: 2.11 L
TLCPLETH-%PRED-PRE: 94 %
TLCPLETH-PRE: 4.65 L
TLCPLETH-PRED: 4.94 L
VA-%PRED-PRE: 84 %
VA-PRE: 3.84 L
VC-%PRED-PRE: 80 %
VC-PRE: 2.47 L
VC-PRED: 3.06 L

## 2024-06-24 ENCOUNTER — TELEPHONE (OUTPATIENT)
Dept: CARDIOLOGY | Facility: CLINIC | Age: 74
End: 2024-06-24
Payer: COMMERCIAL

## 2024-06-24 NOTE — TELEPHONE ENCOUNTER
[facility] Call Center    Phone Message    May a detailed message be left on voicemail: yes     Reason for Call: Order(s): Other:   Reason for requested: Hold and bridging orders apixaban ANTICOAGULANT (ELIQUIS ANTICOAGULANT) 5 MG tablet for 7.23.24 Colonoscopy.  Hold 2 days prior to 7.23.24 starting on 7.21.24.  If unable to hold please provider creatinine order within 90 day.s  Date needed: 7.15.24  Provider name: Dr. Gomez    Action Taken: Message routed to:  [facility] cardio    Travel Screening: Not Applicable    Thank you!  [facility]       Date of Service:

## 2024-06-26 NOTE — TELEPHONE ENCOUNTER
Graciela Sánchez, NP  You1 minute ago (12:23 PM)       Ok to hold eliquis for colonoscopy. No need for bridging. She can take aspirin 81mg daily while off eliquis and discontinue once eliquis is resumed.     I called [facility] and spoke to a nurse. I let her know Parvin Sánchez is ok with pt holding eliquis for two days prior to colonoscopy, but she will bridge with baby aspirin. She said that was fine, but to let pt know she needs to take aspirin while holding the eliquis. I called pt with an update to hold eliquis for 2 days prior to 7/23 colonoscopy, but take aspirin 81 mg daily on the days she is holding eliquis. Pt said she has a visit with Parvin Sánchez on 7/8 and will discuss any more questions she may have at that time. Nolberto LÓPEZ June 26, 2024, 1:00 PM

## 2024-06-28 ENCOUNTER — DOCUMENTATION ONLY (OUTPATIENT)
Dept: SLEEP MEDICINE | Facility: CLINIC | Age: 74
End: 2024-06-28
Payer: COMMERCIAL

## 2024-06-28 NOTE — PROGRESS NOTES
14  DAY STM VISIT    Diagnostic AHI:  HST: 26.8        MESSAGE LEFT FOR PT TO RETURN CALL    Assessment: Pt meeting objective benchmarks.      Action plan: waiting for patient to return call.         PAP settings:  CPAP MIN CPAP MAX 95TH % PRESSURE EPR RESMED SOFT RESPONSE SETTING   5.0 cm  H20 15.0 cm  H20 10.6 cm  H20  TWO OFF     Objective measures: 14 day rolling measures   COMPLIANCE LEAK AHI AVERAGE USE IN MINUTES   85 % 6.43 0.79 322   GOAL >70% GOAL < 24 LPM GOAL <5 GOAL >240      Patient has the following upcoming sleep appts:  Future Sleep Appointments         Provider Department    8/12/2024 8:30 AM (Arrive by 8:15 AM) Luz Fontaine APRN [facility] [place]            Total time spent on accessing and interpreting remote patient PAP therapy data  10 minutes    Total time spent counseling, coaching  and reviewing PAP therapy data with patient  0 minute    [id number]  [id number]  no (3 day STM)

## 2024-07-08 ENCOUNTER — OFFICE VISIT (OUTPATIENT)
Dept: CARDIOLOGY | Facility: CLINIC | Age: 74
End: 2024-07-08
Attending: NURSE PRACTITIONER
Payer: COMMERCIAL

## 2024-07-08 VITALS
BODY MASS INDEX: 27.91 KG/M2 | SYSTOLIC BLOOD PRESSURE: 150 MMHG | DIASTOLIC BLOOD PRESSURE: 75 MMHG | WEIGHT: 163.5 LBS | HEIGHT: 64 IN | OXYGEN SATURATION: 97 % | HEART RATE: 60 BPM

## 2024-07-08 DIAGNOSIS — I10 ESSENTIAL HYPERTENSION: ICD-10-CM

## 2024-07-08 DIAGNOSIS — I25.10 CORONARY ARTERY DISEASE INVOLVING NATIVE CORONARY ARTERY OF NATIVE HEART WITHOUT ANGINA PECTORIS: ICD-10-CM

## 2024-07-08 PROCEDURE — 99214 OFFICE O/P EST MOD 30 MIN: CPT | Performed by: NURSE PRACTITIONER

## 2024-07-08 RX ORDER — METOPROLOL SUCCINATE 25 MG/1
25 TABLET, EXTENDED RELEASE ORAL DAILY
COMMUNITY
Start: 2024-07-08

## 2024-07-08 NOTE — PATIENT INSTRUCTIONS
Today's Recommendations    Let's recheck your blood pressure in 2 weeks once you have taken your medications consistently   We will recheck your cholesterol in April 2024   Continue all medications without changes.  Please follow up with Dr. Gomez in March/April 2024.    Please send a [employer] message or call [phone] to the RN team with questions or concerns.     Scheduling number [phone]  PORTILLO Noel, CNP

## 2024-07-08 NOTE — LETTER
7/8/2024    Pilar Pop MD  [address]  [facility] [address]    RE: Inés Griggs       Dear Colleague,     I had the pleasure of seeing Inés Griggs in the [facility].    Cardiology Clinic Progress Note  Inés Griggs MRN# [id number]   YOB: 1950 Age: 74 year old   Primary Cardiologist: Dr. Gomez  Reason for visit: Follow up hypertension            Assessment and Plan:       1.  Paroxysmal atrial fibrillation, [id number]  -3/2024 Zio patch monitor showed no recurrence of atrial fibrillation  -Anticoagulated with Eliquis    2.  Coronary artery disease  -2022 Calcium score of 604, placing patient in the 93rd percentile with subsequent negative exercise nuclear stress test  -No anginal symptoms    3.  Hypertension, un-controlled  -On multidrug regimen  -No longer on lisinopril as it caused her cough    4.  Dyslipidemia  -3/2024 lipid panel with total cholesterol 161, HDL 53, LDL 85, triglycerides 115  -Rosuvastatin increased 4/2024    Plan:  1.  Continue rosuvastatin to 20 mg daily   2.  Continue eliquis 5mg BID   3.  Nurse blood pressure check in 2 weeks once compliant with medications   4.  Recommend continuing to use CPAP machine    Follow up plan: Follow-up with Dr. Gomez in March 2025 with FLP/ALT prior         History of Presenting Illness:    Inés Griggs is a very pleasant 74 year old female with a history of paroxysmal atrial fibrillation, medically managed coronary artery hypertension, dyslipidemia.    Patient was seen by Dr. Gomez in March 2024 to establish care.  Prior to their visit he did have an echocardiogram done on 3/5/2024 which showed normal ejection fraction, no regional wall motion abnormalities, normal RV size and function, moderate left atrial dilation, and 1+ MR and TR.  At that visit she was not having any anginal symptoms and was notably in a sinus rhythm.  He discontinued her aspirin and kept her on Eliquis for  anticoagulation.  She wore a Zio patch monitor which showed no recurrent atrial fibrillation or other significant dysrhythmia. Her lowest HR was 42bpm while sleeping and average 61bpm.  He also discontinued her amlodipine-benazepril combo pill as she was already taking an ARB (losartan-HCTZ).  She was continued on amlodipine 2.5 mg daily alone.    I met Inés in April 2024 at which time she continued to have issues with hypertension. I increased her amlodipine as well as her rosuvastastin due to sub optimal lipid control.     Patient is here today for further review. Patient denies chest pain or chest tightness. Denies dizziness, lightheadedness or other presyncopal symptoms. Denies tachycardia or palpitations.     She notes when she exercises she is not able to get her heart rate up above 120, even with the reduced dose of metoprolol. She is unable to keep up with her friends when she is pole hiking on hills due to fatigue. She has no issues with biking and getting fatigued.     She denies any issues with myalgias while taking her rosuvastatin, she takes co-Q10 which has been helpful.  Denies any issues with bleeding.    Blood pressure 150/75 and HR 60 in clinic today.  She has been missing her amlodipine at night occasionally.  At home blood pressure has been running 120-130/60's. She checks her heart each day with [employer] mobile device and has not seen any recurrence of atrial fibrillation.     She has been watching her salt intake more closely.  Drinks 2 glasses of wine a few times per week. She is a former smoker.         Recent Hospitalizations   None in 2024           Social History      Social History     Socioeconomic History    Marital status:      Spouse name: Not on file    Number of children: Not on file    Years of education: Not on file    Highest education level: Not on file   Occupational History    Not on file   Tobacco Use    Smoking status: Former     Current packs/day: 0.00     Average  "packs/day: 0.8 packs/day for 17.0 years (12.8 ttl pk-yrs)     Types: Cigarettes     Start date:      Quit date:      Years since quittin.5    Smokeless tobacco: Never   Vaping Use    Vaping status: Never Used   Substance and Sexual Activity    Alcohol use: Yes     Alcohol/week: 10.0 standard drinks of alcohol     Types: 10 Glasses of wine per week     Comment: 1-2 glasses wine, weekends    Drug use: Not Currently    Sexual activity: Not on file   Other Topics Concern    Parent/sibling w/ CABG, MI or angioplasty before 65F 55M? Not Asked   Social History Narrative    Not on file     Social Determinants of Health     Financial Resource Strain: Not on file   Food Insecurity: Not on file   Transportation Needs: Not on file   Physical Activity: Not on file   Stress: Not on file   Social Connections: Not on file   Interpersonal Safety: Not on file   Housing Stability: Not on file            Review of Systems:   Skin:  not assessed     Eyes:  not assessed    ENT:  not assessed    Respiratory:  Positive for CPAP;sleep apnea;dyspnea on exertion  Cardiovascular:    Positive for;edema;fatigue  Gastroenterology: not assessed    Genitourinary:  not assessed    Musculoskeletal:  not assessed    Neurologic:  not assessed    Psychiatric:  not assessed    Heme/Lymph/Imm:  not assessed    Endocrine:  Negative           Physical Exam:   Vitals: BP (!) 150/75   Pulse 60   Ht 1.626 m (5' 4\")   Wt 74.2 kg (163 lb 8 oz)   SpO2 97%   BMI 28.06 kg/m     Wt Readings from Last 4 Encounters:   24 74.2 kg (163 lb 8 oz)   24 74.1 kg (163 lb 6.4 oz)   24 73.5 kg (162 lb 1.6 oz)   24 75.8 kg (167 lb)     GEN: well nourished, in no acute distress.  HEENT:  Pupils equal, round. Sclerae nonicteric.   NECK: Supple, no masses appreciated.   C/V:  Regular rate and rhythm, no murmur, rub or gallop.    RESP: Respirations are unlabored. Clear to auscultation bilaterally without wheezing, rales, or rhonchi.  GI: " "Abdomen soft, nontender.  EXTREM: trace right pedal edema   NEURO: Alert and oriented, cooperative.  SKIN: Warm and dry.        Data:       LIPID RESULTS:  Lab Results   Component Value Date    CHOL 161 04/22/2024    HDL 53 04/22/2024    LDL 85 04/22/2024    TRIG 115 04/22/2024    TRIG 99 07/15/2022     LIVER ENZYME RESULTS:  Lab Results   Component Value Date    AST 23 03/07/2024    ALT 35 03/07/2024     CBC RESULTS:  Lab Results   Component Value Date    WBC 7.7 03/07/2024    RBC 3.61 (L) 03/07/2024    HGB 11.9 03/07/2024    HGB 11.2 (L) 05/28/2020    HCT 36.5 03/07/2024     (H) 03/07/2024    MCH 33.0 03/07/2024    MCHC 32.6 03/07/2024    RDW 12.1 03/07/2024     03/07/2024     BMP RESULTS:  Lab Results   Component Value Date     03/07/2024     05/28/2020    POTASSIUM 4.2 03/07/2024    POTASSIUM 3.8 07/21/2021    POTASSIUM 3.5 05/28/2020    CHLORIDE 105 03/07/2024    CHLORIDE 103 07/15/2022    CHLORIDE 111 (H) 05/28/2020    CO2 24 03/07/2024    CO2 23 05/28/2020    ANIONGAP 14 03/07/2024    ANIONGAP 6 05/28/2020     (H) 03/07/2024     (H) 05/28/2020    BUN 16.3 03/07/2024    BUN 15 05/28/2020    CR 0.69 03/07/2024    CR 0.64 05/28/2020    GFRESTIMATED >90 03/07/2024    GFRESTIMATED >90 05/28/2020    GFRESTBLACK >90 05/28/2020    NADJA 9.8 03/07/2024    NADJA 8.3 (L) 05/28/2020      A1C RESULTS:  No results found for: \"A1C\"  INR RESULTS:  No results found for: \"INR\"         Medications     Current Outpatient Medications   Medication Sig Dispense Refill    acetaminophen (TYLENOL) 325 MG tablet Take 3 tablets (975 mg) by mouth 4 times daily as needed for other (mild pain) 100 tablet 0    amLODIPine (NORVASC) 5 MG tablet Take 1 tablet (5 mg) by mouth daily 90 tablet 1    apixaban ANTICOAGULANT (ELIQUIS ANTICOAGULANT) 5 MG tablet Take 1 tablet (5 mg) by mouth 2 times daily 180 tablet 3    cholecalciferol (VITAMIN D3) 125 mcg (5000 units) capsule       Coenzyme Q10 (COQ-10 PO) Take 1 " tablet by mouth every other day      diphenhydrAMINE (BENADRYL) 25 MG tablet Take 25 mg by mouth nightly as needed for itching or allergies      Glucosamine HCl (GLUCOSAMINE PO) Take 1 tablet by mouth daily as needed      losartan-hydrochlorothiazide (HYZAAR) 100-25 MG tablet Take 1 tablet by mouth every morning      metoprolol succinate ER (TOPROL XL) 25 MG 24 hr tablet Take 1 tablet (25 mg) by mouth daily Half dose 25mg      Multiple Vitamins-Minerals (PRESERVISION AREDS 2) CAPS Take by mouth 2 times daily      Omega-3 Fatty Acids (FISH OIL PO) Take 2 capsules by mouth daily      rosuvastatin (CRESTOR) 20 MG tablet Take 1 tablet (20 mg) by mouth daily 90 tablet 0    sertraline (ZOLOFT) 100 MG tablet Take 100 mg by mouth daily      calcium carbonate (TUMS) 500 MG chewable tablet Take 1 chew tab by mouth 2 times daily as needed for heartburn (Patient not taking: Reported on 7/8/2024)            Past Medical History     Past Medical History:   Diagnosis Date    Anxiety     Arthritis     Hyperlipidemia     Hypertension     Impaired fasting glucose     Osteopenia      Past Surgical History:   Procedure Laterality Date    ARTHROPLASTY HIP ANTERIOR Right 05/27/2020    Procedure: RIGHT TOTAL HIP ARTHROPLASTY DIRECT ANTERIOR APPROACH;  Surgeon: Usama Christianson MD;  Location: [facility] OR    ARTHROPLASTY KNEE Left 07/21/2021    Procedure: left total knee arthroplasty;  Surgeon: Usama Christianson MD;  Location: [facility] OR    CL AFF SURGICAL PATHOLOGY      benign    ENT SURGERY Right     parotid ectomy, in 30s    GYN SURGERY      c section    ORTHOPEDIC SURGERY Right 2020    RT     Family History   Problem Relation Age of Onset    Hypertension Mother     Aneurysm Mother     Myocardial Infarction Father     Atrial fibrillation Sister     Heart Failure Sister     Sleep Apnea Daughter     Bipolar Disorder Son             Allergies   Zetia [ezetimibe]        Graciela Sánchez NP  [facility]  Pager:  [phone]       Thank you for allowing me to participate in the care of your patient.      Sincerely,     Graciela Sánchez NP     [facility]  cc:   Graciela Sánchez NP  [id number]

## 2024-07-08 NOTE — PROGRESS NOTES
Cardiology Clinic Progress Note  Inés Griggs MRN# [id number]   YOB: 1950 Age: 74 year old   Primary Cardiologist: Dr. Gomez  Reason for visit: Follow up hypertension            Assessment and Plan:       1.  Paroxysmal atrial fibrillation, [id number]  -3/2024 Zio patch monitor showed no recurrence of atrial fibrillation  -Anticoagulated with Eliquis    2.  Coronary artery disease  -2022 Calcium score of 604, placing patient in the 93rd percentile with subsequent negative exercise nuclear stress test  -No anginal symptoms    3.  Hypertension, un-controlled  -On multidrug regimen  -No longer on lisinopril as it caused her cough    4.  Dyslipidemia  -3/2024 lipid panel with total cholesterol 161, HDL 53, LDL 85, triglycerides 115  -Rosuvastatin increased 4/2024    Plan:  1.  Continue rosuvastatin to 20 mg daily   2.  Continue eliquis 5mg BID   3.  Nurse blood pressure check in 2 weeks once compliant with medications   4.  Recommend continuing to use CPAP machine    Follow up plan: Follow-up with Dr. Gomez in March 2025 with FLP/ALT prior         History of Presenting Illness:    Inés Griggs is a very pleasant 74 year old female with a history of paroxysmal atrial fibrillation, medically managed coronary artery hypertension, dyslipidemia.    Patient was seen by Dr. Gomez in March 2024 to establish care.  Prior to their visit he did have an echocardiogram done on 3/5/2024 which showed normal ejection fraction, no regional wall motion abnormalities, normal RV size and function, moderate left atrial dilation, and 1+ MR and TR.  At that visit she was not having any anginal symptoms and was notably in a sinus rhythm.  He discontinued her aspirin and kept her on Eliquis for anticoagulation.  She wore a Zio patch monitor which showed no recurrent atrial fibrillation or other significant dysrhythmia. Her lowest HR was 42bpm while sleeping and average 61bpm.  He also discontinued her  amlodipine-benazepril combo pill as she was already taking an ARB (losartan-HCTZ).  She was continued on amlodipine 2.5 mg daily alone.    I met Inés in 2024 at which time she continued to have issues with hypertension. I increased her amlodipine as well as her rosuvastastin due to sub optimal lipid control.     Patient is here today for further review. Patient denies chest pain or chest tightness. Denies dizziness, lightheadedness or other presyncopal symptoms. Denies tachycardia or palpitations.     She notes when she exercises she is not able to get her heart rate up above 120, even with the reduced dose of metoprolol. She is unable to keep up with her friends when she is pole hiking on hills due to fatigue. She has no issues with biking and getting fatigued.     She denies any issues with myalgias while taking her rosuvastatin, she takes co-Q10 which has been helpful.  Denies any issues with bleeding.    Blood pressure 150/75 and HR 60 in clinic today.  She has been missing her amlodipine at night occasionally.  At home blood pressure has been running 120-130/60's. She checks her heart each day with [employer] mobile device and has not seen any recurrence of atrial fibrillation.     She has been watching her salt intake more closely.  Drinks 2 glasses of wine a few times per week. She is a former smoker.         Recent Hospitalizations   None in            Social History      Social History     Socioeconomic History    Marital status:      Spouse name: Not on file    Number of children: Not on file    Years of education: Not on file    Highest education level: Not on file   Occupational History    Not on file   Tobacco Use    Smoking status: Former     Current packs/day: 0.00     Average packs/day: 0.8 packs/day for 17.0 years (12.8 ttl pk-yrs)     Types: Cigarettes     Start date:      Quit date:      Years since quittin.5    Smokeless tobacco: Never   Vaping Use    Vaping status:  "Never Used   Substance and Sexual Activity    Alcohol use: Yes     Alcohol/week: 10.0 standard drinks of alcohol     Types: 10 Glasses of wine per week     Comment: 1-2 glasses wine, weekends    Drug use: Not Currently    Sexual activity: Not on file   Other Topics Concern    Parent/sibling w/ CABG, MI or angioplasty before 65F 55M? Not Asked   Social History Narrative    Not on file     Social Determinants of Health     Financial Resource Strain: Not on file   Food Insecurity: Not on file   Transportation Needs: Not on file   Physical Activity: Not on file   Stress: Not on file   Social Connections: Not on file   Interpersonal Safety: Not on file   Housing Stability: Not on file            Review of Systems:   Skin:  not assessed     Eyes:  not assessed    ENT:  not assessed    Respiratory:  Positive for CPAP;sleep apnea;dyspnea on exertion  Cardiovascular:    Positive for;edema;fatigue  Gastroenterology: not assessed    Genitourinary:  not assessed    Musculoskeletal:  not assessed    Neurologic:  not assessed    Psychiatric:  not assessed    Heme/Lymph/Imm:  not assessed    Endocrine:  Negative           Physical Exam:   Vitals: BP (!) 150/75   Pulse 60   Ht 1.626 m (5' 4\")   Wt 74.2 kg (163 lb 8 oz)   SpO2 97%   BMI 28.06 kg/m     Wt Readings from Last 4 Encounters:   07/08/24 74.2 kg (163 lb 8 oz)   04/25/24 74.1 kg (163 lb 6.4 oz)   03/14/24 73.5 kg (162 lb 1.6 oz)   03/06/24 75.8 kg (167 lb)     GEN: well nourished, in no acute distress.  HEENT:  Pupils equal, round. Sclerae nonicteric.   NECK: Supple, no masses appreciated.   C/V:  Regular rate and rhythm, no murmur, rub or gallop.    RESP: Respirations are unlabored. Clear to auscultation bilaterally without wheezing, rales, or rhonchi.  GI: Abdomen soft, nontender.  EXTREM: trace right pedal edema   NEURO: Alert and oriented, cooperative.  SKIN: Warm and dry.        Data:       LIPID RESULTS:  Lab Results   Component Value Date    CHOL 161 04/22/2024 " "   HDL 53 04/22/2024    LDL 85 04/22/2024    TRIG 115 04/22/2024    TRIG 99 07/15/2022     LIVER ENZYME RESULTS:  Lab Results   Component Value Date    AST 23 03/07/2024    ALT 35 03/07/2024     CBC RESULTS:  Lab Results   Component Value Date    WBC 7.7 03/07/2024    RBC 3.61 (L) 03/07/2024    HGB 11.9 03/07/2024    HGB 11.2 (L) 05/28/2020    HCT 36.5 03/07/2024     (H) 03/07/2024    MCH 33.0 03/07/2024    MCHC 32.6 03/07/2024    RDW 12.1 03/07/2024     03/07/2024     BMP RESULTS:  Lab Results   Component Value Date     03/07/2024     05/28/2020    POTASSIUM 4.2 03/07/2024    POTASSIUM 3.8 07/21/2021    POTASSIUM 3.5 05/28/2020    CHLORIDE 105 03/07/2024    CHLORIDE 103 07/15/2022    CHLORIDE 111 (H) 05/28/2020    CO2 24 03/07/2024    CO2 23 05/28/2020    ANIONGAP 14 03/07/2024    ANIONGAP 6 05/28/2020     (H) 03/07/2024     (H) 05/28/2020    BUN 16.3 03/07/2024    BUN 15 05/28/2020    CR 0.69 03/07/2024    CR 0.64 05/28/2020    GFRESTIMATED >90 03/07/2024    GFRESTIMATED >90 05/28/2020    GFRESTBLACK >90 05/28/2020    NADJA 9.8 03/07/2024    NADJA 8.3 (L) 05/28/2020      A1C RESULTS:  No results found for: \"A1C\"  INR RESULTS:  No results found for: \"INR\"         Medications     Current Outpatient Medications   Medication Sig Dispense Refill    acetaminophen (TYLENOL) 325 MG tablet Take 3 tablets (975 mg) by mouth 4 times daily as needed for other (mild pain) 100 tablet 0    amLODIPine (NORVASC) 5 MG tablet Take 1 tablet (5 mg) by mouth daily 90 tablet 1    apixaban ANTICOAGULANT (ELIQUIS ANTICOAGULANT) 5 MG tablet Take 1 tablet (5 mg) by mouth 2 times daily 180 tablet 3    cholecalciferol (VITAMIN D3) 125 mcg (5000 units) capsule       Coenzyme Q10 (COQ-10 PO) Take 1 tablet by mouth every other day      diphenhydrAMINE (BENADRYL) 25 MG tablet Take 25 mg by mouth nightly as needed for itching or allergies      Glucosamine HCl (GLUCOSAMINE PO) Take 1 tablet by mouth daily as " needed      losartan-hydrochlorothiazide (HYZAAR) 100-25 MG tablet Take 1 tablet by mouth every morning      metoprolol succinate ER (TOPROL XL) 25 MG 24 hr tablet Take 1 tablet (25 mg) by mouth daily Half dose 25mg      Multiple Vitamins-Minerals (PRESERVISION AREDS 2) CAPS Take by mouth 2 times daily      Omega-3 Fatty Acids (FISH OIL PO) Take 2 capsules by mouth daily      rosuvastatin (CRESTOR) 20 MG tablet Take 1 tablet (20 mg) by mouth daily 90 tablet 0    sertraline (ZOLOFT) 100 MG tablet Take 100 mg by mouth daily      calcium carbonate (TUMS) 500 MG chewable tablet Take 1 chew tab by mouth 2 times daily as needed for heartburn (Patient not taking: Reported on 7/8/2024)            Past Medical History     Past Medical History:   Diagnosis Date    Anxiety     Arthritis     Hyperlipidemia     Hypertension     Impaired fasting glucose     Osteopenia      Past Surgical History:   Procedure Laterality Date    ARTHROPLASTY HIP ANTERIOR Right 05/27/2020    Procedure: RIGHT TOTAL HIP ARTHROPLASTY DIRECT ANTERIOR APPROACH;  Surgeon: Usama Christianson MD;  Location: [facility] OR    ARTHROPLASTY KNEE Left 07/21/2021    Procedure: left total knee arthroplasty;  Surgeon: Usama Christianson MD;  Location: [facility] OR    CL AFF SURGICAL PATHOLOGY      benign    ENT SURGERY Right     parotid ectomy, in 30s    GYN SURGERY      c section    ORTHOPEDIC SURGERY Right 2020    RT     Family History   Problem Relation Age of Onset    Hypertension Mother     Aneurysm Mother     Myocardial Infarction Father     Atrial fibrillation Sister     Heart Failure Sister     Sleep Apnea Daughter     Bipolar Disorder Son             Allergies   Zetia [ezetimibe]        Graciela Sánchez NP  [facility]  Pager: [phone]

## 2024-07-09 ENCOUNTER — DOCUMENTATION ONLY (OUTPATIENT)
Dept: SLEEP MEDICINE | Facility: CLINIC | Age: 74
End: 2024-07-09
Payer: COMMERCIAL

## 2024-07-09 DIAGNOSIS — G47.33 OSA (OBSTRUCTIVE SLEEP APNEA): Primary | ICD-10-CM

## 2024-07-09 NOTE — PROGRESS NOTES
30 DAY STM VISIT    Diagnostic AHI:  HST: 26.8        PAP settings:  CPAP MIN CPAP MAX 95TH % PRESSURE EPR RESMED SOFT RESPONSE SETTING   5.0 cm  H20 15.0 cm  H20 11.4 cm  H20  TWO OFF     Device type: Auto-CPAP  Mask type:  Nuance pro nasal pillows    Objective measures: 14 day rolling measures:    COMPLIANCE LEAK AHI AVERAGE USE IN MINUTES   71 % 4.91 0.58 261   GOAL >70% GOAL < 24 LPM GOAL <5 GOAL >240        Assessment: Pt meeting objective benchmarks.  Patient failing following subjective benchmarks: pressure issues and not feeling benefit from therapy  Subjective measures: Pt reports that she met her 21 days csompliance which she indeed has. She wanted to discuss that she found the continued monitoring to be intrusive and wanted to know how she could manage that. I let her know about setting her machine to Airplane mode. sShe said that she isn't sure CPAP is helping her sleep. She had complaints of the compliance requirement by medicare. She says the mask fit reads good but is finicky. She sometimes has bloating. She doesn't like the straps of her nuance. She also said she has upcoming trips that she is not going to use / take her CPAP with. She wanted to know if that was ok. I advised ehr she is to use her sleep apnea therapy everynight, and that we would not recommend skipping CPAP. I offered to lower her pressure to ease bloating and she is interested. I referred her to [facility] for assistance with her mask and confirming her compliance requirements for her insurance. Pt was given my contact information and instructed to reach out with any questions or concerns. Will put in for pressure change 5-12 cm H2O.     Action plan: pt to have 6 month [facility] visit  Patient has the following upcoming sleep appts:  Future Sleep Appointments         Provider Department    8/12/2024 8:30 AM (Arrive by 8:15 AM) Luz Fontaine APRN [facility]          Total time spent on accessing  and interpreting remote patient PAP therapy data  10 minutes    Total time spent counseling, coaching  and reviewing PAP therapy data with patient  10 minutes     [id number] this call  [id number] no  at 3 or 14 day STM

## 2024-07-10 ENCOUNTER — DOCUMENTATION ONLY (OUTPATIENT)
Dept: SLEEP MEDICINE | Facility: CLINIC | Age: 74
End: 2024-07-10
Payer: COMMERCIAL

## 2024-07-10 NOTE — PROGRESS NOTES
Patient came to [facility]  for mask fitting appointment on July 10, 2024. Patient requested to switch masks because Nuance Pro headgear slips up on her hair. Discussed the following masks: Airfit P10 for Her, Airfit P30i small, Cordero FX [place]. Patient selected a Resmed mask name: Airfit P30i nasal pillow mask size small starter kit.

## 2024-07-22 ENCOUNTER — ALLIED HEALTH/NURSE VISIT (OUTPATIENT)
Dept: CARDIOLOGY | Facility: CLINIC | Age: 74
End: 2024-07-22
Attending: NURSE PRACTITIONER
Payer: COMMERCIAL

## 2024-07-22 ENCOUNTER — TELEPHONE (OUTPATIENT)
Dept: CARDIOLOGY | Facility: CLINIC | Age: 74
End: 2024-07-22

## 2024-07-22 VITALS
WEIGHT: 153.8 LBS | DIASTOLIC BLOOD PRESSURE: 71 MMHG | BODY MASS INDEX: 26.26 KG/M2 | OXYGEN SATURATION: 98 % | SYSTOLIC BLOOD PRESSURE: 123 MMHG | HEART RATE: 61 BPM | HEIGHT: 64 IN

## 2024-07-22 DIAGNOSIS — I10 ESSENTIAL HYPERTENSION: ICD-10-CM

## 2024-07-22 PROCEDURE — 99207 PR NO CHARGE NURSE ONLY: CPT | Performed by: NURSE PRACTITIONER

## 2024-07-22 PROCEDURE — 99207 PR NO CHARGE NURSE ONLY: CPT

## 2024-07-22 NOTE — PROGRESS NOTES
Last office visit: 24    Previous blood pressure:   150/75   Mm Hg     Previous heart rate:    60  bpm    Time of readin:32am    Morning medications were taken at: 8:15am    Today's blood pressure:   123/71    mm Hg    Today's heart rate:     61  bpm     Ordering Provider: Graciela Sánchez NP    Results sent to team # : [id number]    Additional comments:         Edelmira

## 2024-07-22 NOTE — TELEPHONE ENCOUNTER
Last office visit: 24    Previous blood pressure:   150/75   Mm Hg     Previous heart rate:    60  bpm    Time of readin:32am    Morning medications were taken at: 8:15am    Today's blood pressure:   123/71    mm Hg    Today's heart rate:     61  bpm     Ordering Provider: Graciela Sánchez NP    Results sent to team # : [id number]    Additional comments:     TRENA Chery

## 2024-08-07 ASSESSMENT — SLEEP AND FATIGUE QUESTIONNAIRES
HOW LIKELY ARE YOU TO NOD OFF OR FALL ASLEEP WHEN YOU ARE A PASSENGER IN A CAR FOR AN HOUR WITHOUT A BREAK: WOULD NEVER DOZE
HOW LIKELY ARE YOU TO NOD OFF OR FALL ASLEEP WHILE SITTING INACTIVE IN A PUBLIC PLACE: WOULD NEVER DOZE
HOW LIKELY ARE YOU TO NOD OFF OR FALL ASLEEP WHILE SITTING AND TALKING TO SOMEONE: WOULD NEVER DOZE
HOW LIKELY ARE YOU TO NOD OFF OR FALL ASLEEP WHILE WATCHING TV: SLIGHT CHANCE OF DOZING
HOW LIKELY ARE YOU TO NOD OFF OR FALL ASLEEP WHILE SITTING AND READING: SLIGHT CHANCE OF DOZING
HOW LIKELY ARE YOU TO NOD OFF OR FALL ASLEEP WHILE LYING DOWN TO REST IN THE AFTERNOON WHEN CIRCUMSTANCES PERMIT: SLIGHT CHANCE OF DOZING
HOW LIKELY ARE YOU TO NOD OFF OR FALL ASLEEP WHILE SITTING QUIETLY AFTER LUNCH WITHOUT ALCOHOL: SLIGHT CHANCE OF DOZING
HOW LIKELY ARE YOU TO NOD OFF OR FALL ASLEEP IN A CAR, WHILE STOPPED FOR A FEW MINUTES IN TRAFFIC: WOULD NEVER DOZE

## 2024-08-11 NOTE — PROGRESS NOTES
Obstructive Sleep Apnea - PAP Follow-Up Visit:    Chief Complaint   Patient presents with    CPAP Follow Up       Inés Griggs comes in today for follow-up of their moderate sleep apnea, managed with CPAP.     Inés Griggs has a medical history pertinent for moderate obstructive sleep apnea effectively treated with CPAP therapy, primary hypertension, dyslipidemia, new onset atrial fibrillation, overweight, anxiety, osteopenia.    Patient notes that she had 3 episodes of atrial relation in March, and no longer is in the rhythm.  She currently is being treated with apixaban 5 mg twice daily, as well as metoprolol 12.5 mg daily.    Do you use a CPAP Machine at home: Yes  Overall, on a scale of 0-10 how would you rate your CPAP (0 poor, 10 great): 6  Is your mask comfortable: Yes  If not, why:    Is you mask leaking: No  If yes, where do you feel it:    How many night per week does the mask leak (0-7):    Do you notice snoring with mask on: No  Do you notice gasping arousals with mask on: No  Are you having significant oral or nasal dryness: No  Is the pressure setting comfortable: Yes  In not, why:    What type of mask do you use: Nasal Pillow pressure at nose  What is your typical bedtime: 11 pm  How long does it take you to go to sleep on PAP therapy: 10 minutes  What time do you typically get out of bed for the day: 8 am  How many hours on average per night are you using PAP therapy: 5 hrs  How many hours are you sleeping per night: 6 to 8 hrs  Do you feel well rested in the morning: Yes    EPWORTH SLEEPINESS SCALE         8/7/2024    11:36 AM    [place] Sleepiness Scale ( TAWANDA Heck  [id number]<br>ESS - [place]/English - Final version - 21 Nov 07 - [facility] [place].)   Sitting and reading Slight chance of dozing   Watching TV Slight chance of dozing   Sitting, inactive in a public place (e.g. a theatre or a meeting) Would never doze   As a passenger in a car for an hour without a break Would never  "doze   Lying down to rest in the afternoon when circumstances permit Slight chance of dozing   Sitting and talking to someone Would never doze   Sitting quietly after a lunch without alcohol Slight chance of dozing   In a car, while stopped for a few minutes in traffic Would never doze   [place] Score (MC) 4   [place] Score (Sleep) 4       INSOMNIA SEVERITY INDEX (COY)          8/7/2024    11:29 AM   Insomnia Severity Index (COY)   Difficulty falling asleep 1   Difficulty staying asleep 1   Problems waking up too early 1   How SATISFIED/DISSATISFIED are you with your CURRENT sleep pattern? 2   How NOTICEABLE to others do you think your sleep problem is in terms of impairing the quality of your life? 0   How WORRIED/DISTRESSED are you about your current sleep problem? 1   To what extent do you consider your sleep problem to INTERFERE with your daily functioning (e.g. daytime fatigue, mood, ability to function at work/daily chores, concentration, memory, mood, etc.) CURRENTLY? 1   COY Total Score 7       Guidelines for Scoring/Interpretation:  Total score categories:  0-7 = No clinically significant insomnia   8-14 = Subthreshold insomnia   15-21 = Clinical insomnia (moderate severity)  22-28 = Clinical insomnia (severe)  Used via courtesy of www.Compology.va.gov with permission from Dillon Forrest PhD., [facility]    ResMed   Auto-PAP 5.0 - 12.0 cmH2O 30 day usage data:    60% of days with > 4 hours of use. 7/30 days with no use.   Average use 225 minutes per day.   95%ile Leak 25.41 L/min.   CPAP 95% pressure 9.1 cm.   AHI 4.27 events per hour.     Previous Sleep Studies:  Patient underwent home sleep testing on the evening of 4/23/2024 for symptoms suggestive of obstructive sleep apnea.  She is a 74-year-old female with a history of paroxysmal atrial fibrillation.  Estimated body mass index is 28.05 kg/m  as calculated from the following:    Height as of 4/25/24: 1.626 m (5' 4\").    Weight as of 4/25/24: " "74.1 kg (163 lb 6.4 oz).  Total score - [place]: 6 (2024  1:34 PM)  STOP-BAN/8    Analysis Time:  552 minutes      Respiration:   Sleep Associated Hypoxemia: sustained hypoxemia was present. Baseline oxygen saturation was 91%.  Time with saturation less than or equal to 88% was 95 minutes. The lowest oxygen saturation was 79%.   Snoring: Snoring was mild (5% time at average 70 dB.)  Respiratory events: The home study revealed a presence of 27 obstructive apneas and 4 mixed and central apneas. There were 216 hypopneas resulting in a combined apnea/hypopnea index [AHI] of 26.8 events per hour.  AHI was 73.6 per hour supine, 13.7 per hour prone, 17.8 per hour on left side, and 21.4 per hour on right side.   Pattern: Excluding events noted above, respiratory rate and pattern was Normal.        Position: Percent of time spent: supine - 16%, prone - 17%, on left - 37%, on right - 30%.      Heart Rate: By pulse oximetry normal rate was noted.       Assessment:   Moderate obstructive sleep apnea.  Sleep associated hypoxemia was present.      Reviewed by team:  Tobacco  Allergies  Meds  Problems  Med Hx  Surg Hx  Fam Hx        Reviewed by provider:  Tobacco  Allergies  Meds  Problems  Med Hx  Surg Hx  Fam Hx           Problem List:  Patient Active Problem List    Diagnosis Date Noted    BRANDON (obstructive sleep apnea) 2024     Priority: Medium     HSAT 2024  Weight 162 lbs BMI 27.8  AHI 26.8, supine 73.6, Lowest O2 Sat 79%  Time with saturation less than or equal to 88% was 95 minutes.      New onset atrial fibrillation (H) 2024     Priority: Medium    Status post total knee replacement, left 2021     Priority: Medium    Status post right hip replacement 2020     Priority: Medium          /68   Pulse 57   Resp 16   Ht 1.626 m (5' 4\")   Wt 73.9 kg (163 lb)   SpO2 98%   BMI 27.98 kg/m      Impression/Plan:    ICD-10-CM    1. BRANDON (obstructive sleep apnea)  G47.33 " [facility] Comprehensive [facility]      2. New onset atrial fibrillation (H)  I48.91 Sleep Comprehensive DME      3. Overweight  E66.3 Sleep Comprehensive DME      4. Primary hypertension  I10 Sleep Comprehensive DME      5. Dyslipidemia  E78.5 Sleep Comprehensive DME      Inés Griggs and I reviewed her sleep study results from her home sleep test as well as her downloaded statistics from the last 4 weeks of her CPAP usage.  Patient given accolades for making a concerted effort to use her CPAP machine.  However discussed with her compliance requirements of 4 hours/day, 70% of the time.  Patient met compliance at this download of 53%.  Explained to patient the days she does not use her CPAP are days she continues to have BRANDON, and there is risk for return of atrial fibrillation as well as exaggerate her hypertension.  She states she understands.  Discussed with patient to use for CPAP at least 4 hours/day, 70% of the time.  Optimally, patient should use her CPAP therapy the entire duration of her sleep to [place] maximum benefit.  Denies any symptoms commonly associated with obstructive sleep apnea.  Denies any afternoon sleepiness. Inés Griggs denies any intrusion of sleepiness into her driving capability.    Recommend patient optimize her sleep schedule as well as her sleep hygiene practices to mitigate any further sleep disruption.  Recommend patient employ safe driving practices such as not driving motor vehicle should she become drowsy.  Recommend patient continue to maintain her BMI below 30.0  Recommend patient return to the sleep medicine clinic in 1 year, sooner if needed.    30 minutes spent with patient, all of which were spent face-to-face counseling, consulting, coordinating plan of care.      PORTILLO Liang CNP    CC:  Pilar Pop,

## 2024-08-11 NOTE — PATIENT INSTRUCTIONS
Drive Safe... Drive Alive     Sleep health profoundly affects your health, mood, and your safety. 33% of the population (one in three of us) is not getting enough sleep and many have a sleep disorder. Not getting enough sleep or having an untreated / undertreated sleep condition may make us sleepy without even knowing it. In fact, our driving could be dramatically impaired due to our sleep health. As your provider, here are some things I would like you to know about driving:     Here are some warning signs for impairment and dangerous drowsy driving:              -Having been awake more than 16 hours               -Looking tired               -Eyelid drooping              -Head nodding (it could be too late at this point)              -Driving for more than 30 minutes     Some things you could do to make the driving safer if you are experiencing some drowsiness:              -Stop driving and rest              -Call for transportation              -Make sure your sleep disorder is adequately treated     Some things that have been shown NOT to work when experiencing drowsiness while driving:              -Turning on the radio              -Opening windows              -Eating any  distracting  /  entertaining  foods (e.g., sunflower seeds, candy, or any other)              -Talking on the phone      Your decision may not only impact your life, but also the life of others. Please, remember to drive safe for yourself and all of us.   Your Body mass index is 27.98 kg/m .  Weight management is a personal decision.  If you are interested in exploring weight loss strategies, the following discussion covers the approaches that may be successful. Body mass index (BMI) is one way to tell whether you are at a healthy weight, overweight, or obese. It measures your weight in relation to your height.  A BMI of 18.5 to 24.9 is in the healthy range. A person with a BMI of 25 to 29.9 is considered overweight, and someone with a BMI  of 30 or greater is considered obese. More than two-thirds of American adults are considered overweight or obese.  Being overweight or obese increases the risk for further weight gain. Excess weight may lead to heart disease and diabetes.  Creating and following plans for healthy eating and physical activity may help you improve your health.  Weight control is part of healthy lifestyle and includes exercise, emotional health, and healthy eating habits. Careful eating habits lifelong are the mainstay of weight control. Though there are significant health benefits from weight loss, long-term weight loss with diet alone may be very difficult to achieve- studies show long-term success with dietary management in less than 10% of people. Attaining a healthy weight may be especially difficult to achieve in those with severe obesity. In some cases, medications, devices and surgical management might be considered.  What can you do?  If you are overweight or obese and are interested in methods for weight loss, you should discuss this with your provider.   Consider reducing daily calorie intake by 500 calories.   Keep a food journal.   Avoiding skipping meals, consider cutting portions instead.    Diet combined with exercise helps maintain muscle while optimizing fat loss. Strength training is particularly important for building and maintaining muscle mass. Exercise helps reduce stress, increase energy, and improves fitness. Increasing exercise without diet control, however, may not burn enough calories to loose weight.     Start walking three days a week 10-20 minutes at a time  Work towards walking thirty minutes five days a week   Eventually, increase the speed of your walking for 1-2 minutes at time    In addition, we recommend that you review healthy lifestyles and methods for weight loss available through the National Institutes of Health patient information sites:  http://win.niddk.nih.gov/publications/index.htm    And  look into health and wellness programs that may be available through your health insurance provider, employer, local community center, or [employer].            Your Body mass index is 27.98 kg/m .  Weight management is a personal decision.  If you are interested in exploring weight loss strategies, the following discussion covers the approaches that may be successful. Body mass index (BMI) is one way to tell whether you are at a healthy weight, overweight, or obese. It measures your weight in relation to your height.  A BMI of 18.5 to 24.9 is in the healthy range. A person with a BMI of 25 to 29.9 is considered overweight, and someone with a BMI of 30 or greater is considered obese. More than two-thirds of American adults are considered overweight or obese.  Being overweight or obese increases the risk for further weight gain. Excess weight may lead to heart disease and diabetes.  Creating and following plans for healthy eating and physical activity may help you improve your health.  Weight control is part of healthy lifestyle and includes exercise, emotional health, and healthy eating habits. Careful eating habits lifelong are the mainstay of weight control. Though there are significant health benefits from weight loss, long-term weight loss with diet alone may be very difficult to achieve- studies show long-term success with dietary management in less than 10% of people. Attaining a healthy weight may be especially difficult to achieve in those with severe obesity. In some cases, medications, devices and surgical management might be considered.  What can you do?  If you are overweight or obese and are interested in methods for weight loss, you should discuss this with your provider.   Consider reducing daily calorie intake by 500 calories.   Keep a food journal.   Avoiding skipping meals, consider cutting portions instead.    Diet combined with exercise helps maintain muscle while optimizing fat loss. Strength  training is particularly important for building and maintaining muscle mass. Exercise helps reduce stress, increase energy, and improves fitness. Increasing exercise without diet control, however, may not burn enough calories to loose weight.     Start walking three days a week 10-20 minutes at a time  Work towards walking thirty minutes five days a week   Eventually, increase the speed of your walking for 1-2 minutes at time    In addition, we recommend that you review healthy lifestyles and methods for weight loss available through the National Institutes of Health patient information sites:  http://win.niddk.nih.gov/publications/index.htm    And look into health and wellness programs that may be available through your health insurance provider, employer, local community center, or [employer].

## 2024-08-12 ENCOUNTER — OFFICE VISIT (OUTPATIENT)
Dept: SLEEP MEDICINE | Facility: CLINIC | Age: 74
End: 2024-08-12
Payer: COMMERCIAL

## 2024-08-12 VITALS
RESPIRATION RATE: 16 BRPM | BODY MASS INDEX: 27.83 KG/M2 | OXYGEN SATURATION: 98 % | HEART RATE: 57 BPM | DIASTOLIC BLOOD PRESSURE: 68 MMHG | WEIGHT: 163 LBS | HEIGHT: 64 IN | SYSTOLIC BLOOD PRESSURE: 107 MMHG

## 2024-08-12 DIAGNOSIS — I48.91 NEW ONSET ATRIAL FIBRILLATION (H): ICD-10-CM

## 2024-08-12 DIAGNOSIS — E78.5 DYSLIPIDEMIA: ICD-10-CM

## 2024-08-12 DIAGNOSIS — E66.3 OVERWEIGHT: ICD-10-CM

## 2024-08-12 DIAGNOSIS — I10 PRIMARY HYPERTENSION: ICD-10-CM

## 2024-08-12 DIAGNOSIS — G47.33 OSA (OBSTRUCTIVE SLEEP APNEA): Primary | ICD-10-CM

## 2024-08-12 PROCEDURE — 99213 OFFICE O/P EST LOW 20 MIN: CPT | Performed by: NURSE PRACTITIONER

## 2024-09-10 DIAGNOSIS — I25.10 CORONARY ARTERY DISEASE INVOLVING NATIVE CORONARY ARTERY OF NATIVE HEART WITHOUT ANGINA PECTORIS: ICD-10-CM

## 2024-09-10 DIAGNOSIS — E78.5 HYPERLIPIDEMIA WITH TARGET LDL LESS THAN 70: ICD-10-CM

## 2024-09-10 DIAGNOSIS — E78.2 MIXED HYPERLIPIDEMIA: ICD-10-CM

## 2024-09-10 RX ORDER — ROSUVASTATIN CALCIUM 20 MG/1
20 TABLET, COATED ORAL DAILY
Qty: 90 TABLET | Refills: 3 | Status: SHIPPED | OUTPATIENT
Start: 2024-09-10

## 2024-11-15 DIAGNOSIS — I25.10 CORONARY ARTERY DISEASE INVOLVING NATIVE CORONARY ARTERY OF NATIVE HEART WITHOUT ANGINA PECTORIS: ICD-10-CM

## 2024-11-15 DIAGNOSIS — I10 ESSENTIAL HYPERTENSION: ICD-10-CM

## 2024-11-15 RX ORDER — AMLODIPINE BESYLATE 5 MG/1
5 TABLET ORAL DAILY
Qty: 90 TABLET | Refills: 2 | Status: SHIPPED | OUTPATIENT
Start: 2024-11-15

## 2024-12-22 ENCOUNTER — HEALTH MAINTENANCE LETTER (OUTPATIENT)
Age: 74
End: 2024-12-22

## 2025-01-22 ENCOUNTER — MYC MEDICAL ADVICE (OUTPATIENT)
Dept: CARDIOLOGY | Facility: CLINIC | Age: 75
End: 2025-01-22
Payer: COMMERCIAL

## 2025-01-22 DIAGNOSIS — I48.91 NEW ONSET A-FIB (H): ICD-10-CM

## 2025-01-22 DIAGNOSIS — I25.10 CORONARY ARTERY DISEASE INVOLVING NATIVE CORONARY ARTERY OF NATIVE HEART WITHOUT ANGINA PECTORIS: ICD-10-CM

## 2025-01-22 DIAGNOSIS — I10 ESSENTIAL HYPERTENSION: ICD-10-CM

## 2025-03-24 ENCOUNTER — ANCILLARY ORDERS (OUTPATIENT)
Dept: MAMMOGRAPHY | Facility: CLINIC | Age: 75
End: 2025-03-24
Payer: COMMERCIAL

## 2025-03-24 DIAGNOSIS — Z12.31 SCREENING MAMMOGRAM FOR BREAST CANCER: Primary | ICD-10-CM

## 2025-03-27 ENCOUNTER — OFFICE VISIT (OUTPATIENT)
Dept: CARDIOLOGY | Facility: CLINIC | Age: 75
End: 2025-03-27
Payer: COMMERCIAL

## 2025-03-27 VITALS
DIASTOLIC BLOOD PRESSURE: 66 MMHG | BODY MASS INDEX: 28.85 KG/M2 | HEART RATE: 64 BPM | WEIGHT: 169 LBS | OXYGEN SATURATION: 98 % | HEIGHT: 64 IN | SYSTOLIC BLOOD PRESSURE: 126 MMHG

## 2025-03-27 DIAGNOSIS — E78.2 MIXED HYPERLIPIDEMIA: ICD-10-CM

## 2025-03-27 DIAGNOSIS — I10 ESSENTIAL HYPERTENSION: ICD-10-CM

## 2025-03-27 DIAGNOSIS — E78.5 HYPERLIPIDEMIA WITH TARGET LDL LESS THAN 70: ICD-10-CM

## 2025-03-27 DIAGNOSIS — I25.10 CORONARY ARTERY DISEASE INVOLVING NATIVE CORONARY ARTERY OF NATIVE HEART WITHOUT ANGINA PECTORIS: ICD-10-CM

## 2025-03-27 RX ORDER — CARVEDILOL 6.25 MG/1
6.25 TABLET ORAL 2 TIMES DAILY
COMMUNITY
Start: 2025-02-21

## 2025-03-27 RX ORDER — ROSUVASTATIN CALCIUM 10 MG/1
10 TABLET, COATED ORAL DAILY
Qty: 90 TABLET | Refills: 3 | Status: SHIPPED | OUTPATIENT
Start: 2025-03-27

## 2025-03-27 RX ORDER — ASPIRIN 81 MG/1
81 TABLET ORAL DAILY
COMMUNITY
Start: 2025-03-27

## 2025-03-27 RX ORDER — EZETIMIBE 10 MG/1
10 TABLET ORAL DAILY
Qty: 90 TABLET | Refills: 3 | Status: SHIPPED | OUTPATIENT
Start: 2025-03-27

## 2025-03-27 NOTE — PROGRESS NOTES
"CARDIOLOGY CLINIC FOLLOW-UP NOTE      REASON FOR VISIT:   Paroxysmal atrial fibrillation, medically managed CAD    PRIMARY CARE PHYSICIAN:  Pilar Pop        History of Present Illness   Inés Griggs is an extremely pleasant 74 year old female here for routine follow-up.  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  Her medical history is significant for paroxysmal AF diagnosed in 3/2024 (several episodes in 3/2024 without known recurrence since), medically managed CAD (calcium score 604/93rd percentile in 2022 with negative exercise MPI at that time), hypertension, and dyslipidemia.    Since her last visit to our clinic in July 2024, she has been doing well without any cardiac symptoms to speak of.  She has not had any recurrent palpitation episodes like she did with her AF symptoms, and she has checked \"100s\" of ECGs on her Quero Rocka machine, and none of these have shown atrial fibrillation.  She also is exercising regularly and has been feeling well without any exertional symptoms.  She was feeling a bit easily fatigued with exertion and felt that her heart rate was limited too much when she was on metoprolol, but her PCP changed her to carvedilol and she has been feeling better with this.  Lastly, she did stop her Eliquis in January 2025 due to frequent bleeding when she would have her new puppy jump on her, and she thought it would probably be okay as she did not have any documented recurrence of AF.  Lastly, we had tried increasing her Crestor from 10 mg daily up to 20 mg daily in July 2024, but this led to excessive muscle aches, and for the most part she has been taking 1/2 tablet (10 mg) daily for several months.    As part of today's visit, I reviewed her most recent lipid panel, chemistry panel, CBC, echocardiogram, and Zio patch monitor.      Assessment & Plan     Paroxysmal AF (several episodes in 3/2024 without known recurrence since),  [id number] = 4: Off anticoagulation due to " "lack of known recurrence  No AF on 7-day Zio patch from 3/2024  No AF on numerous ECGs on her home Kardia machine  Medically managed CAD (calcium score 604/93rd percentile in 2022 with negative exercise MPI at that time), no obvious angina   Hypertension, well-controlled  Dyslipidemia, with suboptimal control  BRANDON, with difficulty tolerating CPAP      It was a pleasure to speak with Jada again in clinic today.  We had a great discussion about numerous different topics, including appropriate anticoagulation for her AF, management of her coronary disease and dyslipidemia, and treatment options for her sleep apnea outside of CPAP (including the inspire device and GLP-1 agonists, which she is wondering if Zepbound in particular may be covered for an BRANDON indication).  I did my best answer these questions, and ultimately recommended the following:      -Okay to remain off of anticoagulation for now in the absence of any symptomatic or documented AF recurrence.  We did discuss that monitoring with a wearable device such as an Apple Watch may add even more confidence that silent AF has not recurred.  -Continue aspirin 81 mg daily  -We will go back to Crestor 10 mg daily due to intolerable myalgia above this dose  -Recommended starting Zetia 10 mg daily.  She has a documented \"allergy\" to Zetia of myalgias.  She understands that this is not actually an allergy and wonders why this is listed as such.  In addition, I strongly suspect that the myalgias attributed to her Zetia are actually related to a concurrent statin prescription rather than the Zetia itself.  Accordingly, we will try this again and if she really does develop significant myalgias, we can consider other options.  -Goal LDL should be less than 70, or as low as possible without significant quality of life impact from medication side effect  -Continue losartan-HCTZ 100-25 mg daily  -Continue amlodipine 5 mg daily  -Continue carvedilol 6.25 mg twice " daily  -Heart healthy diet, regular aerobic exercise  -We will check on coverage of GLP-1 agonists for an BRANDON indication; we do want to optimize treatment of her sleep apnea as this will potentially reduce her risk of recurrent AF.  We will contact her with information on coverage/pricing.        Follow-up: 6 months, with labs beforehand, or sooner as needed      On the date of the patient's visit, I spent a total of 70 minutes reviewing the patient's chart; interviewing, examining, and counseling the patient; coordinating with other providers as necessary, entering orders, and documenting in the medical chart.      Raymond Gomez MD  Interventional Cardiology  March 27, 2025      The longitudinal plan of care for the diagnosis(es)/condition(s) as documented were addressed during this visit. Due to the added complexity in care, I will continue to support Inés in the subsequent management and with ongoing continuity of care.        Medications   Current Outpatient Medications   Medication Sig Dispense Refill    acetaminophen (TYLENOL) 325 MG tablet Take 3 tablets (975 mg) by mouth 4 times daily as needed for other (mild pain) 100 tablet 0    amLODIPine (NORVASC) 5 MG tablet Take 1 tablet (5 mg) by mouth daily. 90 tablet 2    calcium carbonate (TUMS) 500 MG chewable tablet Take 1 chew tab by mouth 2 times daily as needed for heartburn      carvedilol (COREG) 6.25 MG tablet Take 6.25 mg by mouth 2 times daily.      cholecalciferol (VITAMIN D3) 125 mcg (5000 units) capsule       Coenzyme Q10 (COQ-10 PO) Take 1 tablet by mouth every other day      diphenhydrAMINE (BENADRYL) 25 MG tablet Take 25 mg by mouth nightly as needed for itching or allergies      Glucosamine HCl (GLUCOSAMINE PO) Take 1 tablet by mouth daily as needed      losartan-hydrochlorothiazide (HYZAAR) 100-25 MG tablet Take 1 tablet by mouth every morning      Multiple Vitamins-Minerals (PRESERVISION AREDS 2) CAPS Take by mouth 2 times daily       Omega-3 Fatty Acids (FISH OIL PO) Take 2 capsules by mouth daily      rosuvastatin (CRESTOR) 20 MG tablet Take 1 tablet (20 mg) by mouth daily. 90 tablet 3    sertraline (ZOLOFT) 100 MG tablet Take 100 mg by mouth daily      metoprolol succinate ER (TOPROL XL) 25 MG 24 hr tablet Take 1 tablet (25 mg) by mouth daily Half dose 25mg       No current facility-administered medications for this visit.     Allergies   Allergies   Allergen Reactions    Zetia [Ezetimibe] Muscle Pain (Myalgia)         Physical Exam       BP: 126/66 Pulse: 64     SpO2: 98 %      Vital Signs with Ranges  Pulse:  [64] 64  BP: (126)/(66) 126/66  SpO2:  [98 %] 98 %  169 lbs 0 oz    Constitutional: Well-appearing, no acute distress  Respiratory: Normal respiratory effort, CTAB  Cardiovascular: RRR, faint systolic murmur at the apex.  JVP < 7 cm H2O.  There is no significant LE edema.  Normal carotid upstrokes, no carotid bruits.

## 2025-03-27 NOTE — LETTER
"3/27/2025    Pilar Pop MD  [address]  [facility] [address]    RE: Inés Griggs       Dear Colleague,     I had the pleasure of seeing Inés Griggs in the [facility].  CARDIOLOGY CLINIC FOLLOW-UP NOTE      REASON FOR VISIT:   Paroxysmal atrial fibrillation, medically managed CAD    PRIMARY CARE PHYSICIAN:  Pilar Pop        History of Present Illness  Inés Griggs is an extremely pleasant 74 year old female here for routine follow-up.  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  Her medical history is significant for paroxysmal AF diagnosed in 3/2024 (several episodes in 3/2024 without known recurrence since), medically managed CAD (calcium score 604/93rd percentile in 2022 with negative exercise MPI at that time), hypertension, and dyslipidemia.    Since her last visit to our clinic in July 2024, she has been doing well without any cardiac symptoms to speak of.  She has not had any recurrent palpitation episodes like she did with her AF symptoms, and she has checked \"100s\" of ECGs on her Kardia machine, and none of these have shown atrial fibrillation.  She also is exercising regularly and has been feeling well without any exertional symptoms.  She was feeling a bit easily fatigued with exertion and felt that her heart rate was limited too much when she was on metoprolol, but her PCP changed her to carvedilol and she has been feeling better with this.  Lastly, she did stop her Eliquis in January 2025 due to frequent bleeding when she would have her new puppy jump on her, and she thought it would probably be okay as she did not have any documented recurrence of AF.  Lastly, we had tried increasing her Crestor from 10 mg daily up to 20 mg daily in July 2024, but this led to excessive muscle aches, and for the most part she has been taking 1/2 tablet (10 mg) daily for several months.    As part of today's visit, I reviewed her most recent lipid " "panel, chemistry panel, CBC, echocardiogram, and Zio patch monitor.      Assessment & Plan    Paroxysmal AF (several episodes in 3/2024 without known recurrence since),  [id number] = 4: Off anticoagulation due to lack of known recurrence  No AF on 7-day Zio patch from 3/2024  No AF on numerous ECGs on her home Kardia machine  Medically managed CAD (calcium score 604/93rd percentile in 2022 with negative exercise MPI at that time), no obvious angina   Hypertension, well-controlled  Dyslipidemia, with suboptimal control  BRANDON, with difficulty tolerating CPAP      It was a pleasure to speak with Jada again in clinic today.  We had a great discussion about numerous different topics, including appropriate anticoagulation for her AF, management of her coronary disease and dyslipidemia, and treatment options for her sleep apnea outside of CPAP (including the inspire device and GLP-1 agonists, which she is wondering if Zepbound in particular may be covered for an BRANDON indication).  I did my best answer these questions, and ultimately recommended the following:      -Okay to remain off of anticoagulation for now in the absence of any symptomatic or documented AF recurrence.  We did discuss that monitoring with a wearable device such as an Apple Watch may add even more confidence that silent AF has not recurred.  -Continue aspirin 81 mg daily  -We will go back to Crestor 10 mg daily due to intolerable myalgia above this dose  -Recommended starting Zetia 10 mg daily.  She has a documented \"allergy\" to Zetia of myalgias.  She understands that this is not actually an allergy and wonders why this is listed as such.  In addition, I strongly suspect that the myalgias attributed to her Zetia are actually related to a concurrent statin prescription rather than the Zetia itself.  Accordingly, we will try this again and if she really does develop significant myalgias, we can consider other options.  -Goal LDL should be less than 70, " or as low as possible without significant quality of life impact from medication side effect  -Continue losartan-HCTZ 100-25 mg daily  -Continue amlodipine 5 mg daily  -Continue carvedilol 6.25 mg twice daily  -Heart healthy diet, regular aerobic exercise        Follow-up: 6 months, with labs beforehand, or sooner as needed      On the date of the patient's visit, I spent a total of 70 minutes reviewing the patient's chart; interviewing, examining, and counseling the patient; coordinating with other providers as necessary, entering orders, and documenting in the medical chart.      Raymond Gomez MD  Interventional Cardiology  March 27, 2025      The longitudinal plan of care for the diagnosis(es)/condition(s) as documented were addressed during this visit. Due to the added complexity in care, I will continue to support Inés in the subsequent management and with ongoing continuity of care.        Medications  Current Outpatient Medications   Medication Sig Dispense Refill     acetaminophen (TYLENOL) 325 MG tablet Take 3 tablets (975 mg) by mouth 4 times daily as needed for other (mild pain) 100 tablet 0     amLODIPine (NORVASC) 5 MG tablet Take 1 tablet (5 mg) by mouth daily. 90 tablet 2     calcium carbonate (TUMS) 500 MG chewable tablet Take 1 chew tab by mouth 2 times daily as needed for heartburn       carvedilol (COREG) 6.25 MG tablet Take 6.25 mg by mouth 2 times daily.       cholecalciferol (VITAMIN D3) 125 mcg (5000 units) capsule        Coenzyme Q10 (COQ-10 PO) Take 1 tablet by mouth every other day       diphenhydrAMINE (BENADRYL) 25 MG tablet Take 25 mg by mouth nightly as needed for itching or allergies       Glucosamine HCl (GLUCOSAMINE PO) Take 1 tablet by mouth daily as needed       losartan-hydrochlorothiazide (HYZAAR) 100-25 MG tablet Take 1 tablet by mouth every morning       Multiple Vitamins-Minerals (PRESERVISION AREDS 2) CAPS Take by mouth 2 times daily       Omega-3 Fatty Acids (FISH  OIL PO) Take 2 capsules by mouth daily       rosuvastatin (CRESTOR) 20 MG tablet Take 1 tablet (20 mg) by mouth daily. 90 tablet 3     sertraline (ZOLOFT) 100 MG tablet Take 100 mg by mouth daily       metoprolol succinate ER (TOPROL XL) 25 MG 24 hr tablet Take 1 tablet (25 mg) by mouth daily Half dose 25mg       No current facility-administered medications for this visit.     Allergies  Allergies   Allergen Reactions     Zetia [Ezetimibe] Muscle Pain (Myalgia)         Physical Exam      BP: 126/66 Pulse: 64     SpO2: 98 %      Vital Signs with Ranges  Pulse:  [64] 64  BP: (126)/(66) 126/66  SpO2:  [98 %] 98 %  169 lbs 0 oz    Constitutional: Well-appearing, no acute distress  Respiratory: Normal respiratory effort, CTAB  Cardiovascular: RRR, faint systolic murmur at the apex.  JVP < 7 cm H2O.  There is no significant LE edema.  Normal carotid upstrokes, no carotid bruits.      Thank you for allowing me to participate in the care of your patient.      Sincerely,     Raymond Gomez MD     [facility]  cc:   No referring provider defined for this encounter.

## 2025-04-01 ENCOUNTER — TELEPHONE (OUTPATIENT)
Dept: CARDIOLOGY | Facility: CLINIC | Age: 75
End: 2025-04-01
Payer: COMMERCIAL

## 2025-04-01 NOTE — TELEPHONE ENCOUNTER
Called pt and reviewed pricing information for Zepbound. Pt would like to think about this and get back to us. Also sent pt [employer] message with information.     ----- Message -----   From: Teagan Fowler   Sent: 3/27/2025  11:11 AM CDT   To: Raymond Gomez MD     Patient has Medicare Advantage through [employer].      Zepbound is indicated for BRANDON.  This drug is not on the insurance plan formulary so prior auth is required.  I'm actually not sure what the criteria is--I'd assume you'd want to say it is be used with a reduced-calorie diet and increased physical activity, and give any other treatments patient has tried or is using concurrently (cpap, etc). is not on formulary.  If you were able to get a prior auth approved, patient would pay a 50% coinsurance, equivalent to approximately $700/mo.      That said, Medicare is capping 2025 out of pocket drug costs at $2000.  And, they are offering the Medicare Prescription Payment Plan so patient could spread that $2000 out amongst 12 months.       [facility] is no longer compounding tirzepatide.      [facility] is selling Zepbound in vials.  2.5mg is $349/mo, 5mg/7.5mg/10mg for $500/mo.  Note that these payments will not count towards Medicare's $2000 out-of-pocket max.     Teagan Fowler, [facility]   Pharmacy Technician/Liaison, Discharge Pharmacy   [email or web address]   Pharmacy test claims are estimates and may not reflect final costs.     ----- Message -----   From: Raymond Gomez MD   Sent: 3/27/2025  10:33 AM CDT   To: Teagan Fowler; [facility] Heart Team 4     Hi Teagan,     Could you please check into possible coverage of GLP-1 agonists for a sleep apnea indication (she specifically mentions that Zepbound may be covered for a sleep apnea indication)?  I am not aware of this, but happy to look into it.  She would also like information on compounding pharmacy pricing and if this is still available.       Thanks

## 2025-05-19 DIAGNOSIS — I10 ESSENTIAL HYPERTENSION: ICD-10-CM

## 2025-05-19 DIAGNOSIS — I25.10 CORONARY ARTERY DISEASE INVOLVING NATIVE CORONARY ARTERY OF NATIVE HEART WITHOUT ANGINA PECTORIS: ICD-10-CM

## 2025-05-19 RX ORDER — AMLODIPINE BESYLATE 5 MG/1
5 TABLET ORAL DAILY
Qty: 90 TABLET | Refills: 3 | Status: SHIPPED | OUTPATIENT
Start: 2025-05-19

## 2025-06-13 ENCOUNTER — MEDICAL CORRESPONDENCE (OUTPATIENT)
Dept: HEALTH INFORMATION MANAGEMENT | Facility: CLINIC | Age: 75
End: 2025-06-13
Payer: COMMERCIAL

## 2025-06-16 DIAGNOSIS — R41.0 CONFUSION: Primary | ICD-10-CM

## 2025-06-16 DIAGNOSIS — G45.9 TIA (TRANSIENT ISCHEMIC ATTACK): ICD-10-CM

## 2025-06-18 ENCOUNTER — TRANSFERRED RECORDS (OUTPATIENT)
Dept: HEALTH INFORMATION MANAGEMENT | Facility: CLINIC | Age: 75
End: 2025-06-18
Payer: COMMERCIAL

## 2025-06-24 ENCOUNTER — APPOINTMENT (OUTPATIENT)
Dept: CT IMAGING | Facility: CLINIC | Age: 75
End: 2025-06-24
Attending: EMERGENCY MEDICINE
Payer: COMMERCIAL

## 2025-06-24 ENCOUNTER — HOSPITAL ENCOUNTER (OUTPATIENT)
Dept: ULTRASOUND IMAGING | Facility: CLINIC | Age: 75
Discharge: HOME | End: 2025-06-24
Attending: FAMILY MEDICINE
Payer: COMMERCIAL

## 2025-06-24 ENCOUNTER — HOSPITAL ENCOUNTER (EMERGENCY)
Facility: CLINIC | Age: 75
Discharge: HOME | End: 2025-06-25
Attending: EMERGENCY MEDICINE
Payer: COMMERCIAL

## 2025-06-24 DIAGNOSIS — R41.0 CONFUSION: ICD-10-CM

## 2025-06-24 DIAGNOSIS — R41.3 MEMORY DIFFICULTY: ICD-10-CM

## 2025-06-24 DIAGNOSIS — G45.9 TIA (TRANSIENT ISCHEMIC ATTACK): ICD-10-CM

## 2025-06-24 DIAGNOSIS — Z65.8 PSYCHOSOCIAL STRESSORS: ICD-10-CM

## 2025-06-24 LAB
ALBUMIN SERPL BCG-MCNC: 4.7 G/DL (ref 3.5–5.2)
ALBUMIN UR-MCNC: NEGATIVE MG/DL
ALP SERPL-CCNC: 105 U/L (ref 40–150)
ALT SERPL W P-5'-P-CCNC: 25 U/L (ref 0–50)
ANION GAP SERPL CALCULATED.3IONS-SCNC: 17 MMOL/L (ref 7–15)
APPEARANCE UR: CLEAR
AST SERPL W P-5'-P-CCNC: 36 U/L (ref 0–45)
BASOPHILS # BLD AUTO: 0 10E3/UL (ref 0–0.2)
BASOPHILS NFR BLD AUTO: 0 %
BILIRUB DIRECT SERPL-MCNC: <0.08 MG/DL (ref 0–0.3)
BILIRUB SERPL-MCNC: 0.3 MG/DL
BILIRUB UR QL STRIP: NEGATIVE
BUN SERPL-MCNC: 16.6 MG/DL (ref 8–23)
CALCIUM SERPL-MCNC: 10.1 MG/DL (ref 8.8–10.4)
CHLORIDE SERPL-SCNC: 100 MMOL/L (ref 98–107)
COLOR UR AUTO: ABNORMAL
CREAT SERPL-MCNC: 0.59 MG/DL (ref 0.51–0.95)
EGFRCR SERPLBLD CKD-EPI 2021: >90 ML/MIN/1.73M2
EOSINOPHIL # BLD AUTO: 0.3 10E3/UL (ref 0–0.7)
EOSINOPHIL NFR BLD AUTO: 3 %
ERYTHROCYTE [DISTWIDTH] IN BLOOD BY AUTOMATED COUNT: 11.5 % (ref 10–15)
GLUCOSE SERPL-MCNC: 88 MG/DL (ref 70–99)
GLUCOSE UR STRIP-MCNC: NEGATIVE MG/DL
HCO3 SERPL-SCNC: 21 MMOL/L (ref 22–29)
HCT VFR BLD AUTO: 42.3 % (ref 35–47)
HGB BLD-MCNC: 14.4 G/DL (ref 11.7–15.7)
HGB UR QL STRIP: NEGATIVE
HOLD SPECIMEN: NORMAL
IMM GRANULOCYTES # BLD: 0 10E3/UL
IMM GRANULOCYTES NFR BLD: 0 %
KETONES UR STRIP-MCNC: NEGATIVE MG/DL
LEUKOCYTE ESTERASE UR QL STRIP: ABNORMAL
LYMPHOCYTES # BLD AUTO: 1.4 10E3/UL (ref 0.8–5.3)
LYMPHOCYTES NFR BLD AUTO: 15 %
MCH RBC QN AUTO: 33.1 PG (ref 26.5–33)
MCHC RBC AUTO-ENTMCNC: 34 G/DL (ref 31.5–36.5)
MCV RBC AUTO: 97 FL (ref 78–100)
MONOCYTES # BLD AUTO: 1.5 10E3/UL (ref 0–1.3)
MONOCYTES NFR BLD AUTO: 16 %
MUCOUS THREADS #/AREA URNS LPF: PRESENT /LPF
NEUTROPHILS # BLD AUTO: 6.4 10E3/UL (ref 1.6–8.3)
NEUTROPHILS NFR BLD AUTO: 66 %
NITRATE UR QL: NEGATIVE
NRBC # BLD AUTO: 0 10E3/UL
NRBC BLD AUTO-RTO: 0 /100
PH UR STRIP: 5.5 [PH] (ref 5–7)
PLATELET # BLD AUTO: 224 10E3/UL (ref 150–450)
POTASSIUM SERPL-SCNC: 4.2 MMOL/L (ref 3.4–5.3)
PROT SERPL-MCNC: 8.2 G/DL (ref 6.4–8.3)
RBC # BLD AUTO: 4.35 10E6/UL (ref 3.8–5.2)
RBC URINE: <1 /HPF
SODIUM SERPL-SCNC: 138 MMOL/L (ref 135–145)
SP GR UR STRIP: 1.02 (ref 1–1.03)
SQUAMOUS EPITHELIAL: <1 /HPF
T4 FREE SERPL-MCNC: 1.09 NG/DL (ref 0.9–1.7)
TSH SERPL DL<=0.005 MIU/L-ACNC: 5.03 UIU/ML (ref 0.3–4.2)
UROBILINOGEN UR STRIP-MCNC: NORMAL MG/DL
WBC # BLD AUTO: 9.6 10E3/UL (ref 4–11)
WBC URINE: 2 /HPF

## 2025-06-24 PROCEDURE — 84443 ASSAY THYROID STIM HORMONE: CPT | Performed by: EMERGENCY MEDICINE

## 2025-06-24 PROCEDURE — 250N000013 HC RX MED GY IP 250 OP 250 PS 637: Performed by: EMERGENCY MEDICINE

## 2025-06-24 PROCEDURE — 70450 CT HEAD/BRAIN W/O DYE: CPT

## 2025-06-24 PROCEDURE — 36415 COLL VENOUS BLD VENIPUNCTURE: CPT | Performed by: EMERGENCY MEDICINE

## 2025-06-24 PROCEDURE — 93005 ELECTROCARDIOGRAM TRACING: CPT

## 2025-06-24 PROCEDURE — 93880 EXTRACRANIAL BILAT STUDY: CPT

## 2025-06-24 PROCEDURE — 96361 HYDRATE IV INFUSION ADD-ON: CPT

## 2025-06-24 PROCEDURE — 99285 EMERGENCY DEPT VISIT HI MDM: CPT | Mod: 25

## 2025-06-24 PROCEDURE — 85004 AUTOMATED DIFF WBC COUNT: CPT | Performed by: EMERGENCY MEDICINE

## 2025-06-24 PROCEDURE — 96360 HYDRATION IV INFUSION INIT: CPT

## 2025-06-24 PROCEDURE — 80048 BASIC METABOLIC PNL TOTAL CA: CPT | Performed by: EMERGENCY MEDICINE

## 2025-06-24 PROCEDURE — 258N000003 HC RX IP 258 OP 636: Performed by: EMERGENCY MEDICINE

## 2025-06-24 PROCEDURE — 84439 ASSAY OF FREE THYROXINE: CPT | Performed by: EMERGENCY MEDICINE

## 2025-06-24 PROCEDURE — 82248 BILIRUBIN DIRECT: CPT | Performed by: EMERGENCY MEDICINE

## 2025-06-24 PROCEDURE — 81001 URINALYSIS AUTO W/SCOPE: CPT | Performed by: EMERGENCY MEDICINE

## 2025-06-24 RX ORDER — LABETALOL HYDROCHLORIDE 5 MG/ML
15 INJECTION, SOLUTION INTRAVENOUS ONCE
Status: COMPLETED | OUTPATIENT
Start: 2025-06-24 | End: 2025-06-25

## 2025-06-24 RX ORDER — AMLODIPINE BESYLATE 5 MG/1
5 TABLET ORAL ONCE
Status: COMPLETED | OUTPATIENT
Start: 2025-06-24 | End: 2025-06-24

## 2025-06-24 RX ADMIN — SODIUM CHLORIDE 1000 ML: 0.9 INJECTION, SOLUTION INTRAVENOUS at 22:13

## 2025-06-24 RX ADMIN — AMLODIPINE BESYLATE 5 MG: 5 TABLET ORAL at 22:08

## 2025-06-24 ASSESSMENT — COLUMBIA-SUICIDE SEVERITY RATING SCALE - C-SSRS
1. IN THE PAST MONTH, HAVE YOU WISHED YOU WERE DEAD OR WISHED YOU COULD GO TO SLEEP AND NOT WAKE UP?: NO
2. HAVE YOU ACTUALLY HAD ANY THOUGHTS OF KILLING YOURSELF IN THE PAST MONTH?: NO
6. HAVE YOU EVER DONE ANYTHING, STARTED TO DO ANYTHING, OR PREPARED TO DO ANYTHING TO END YOUR LIFE?: NO

## 2025-06-24 ASSESSMENT — ACTIVITIES OF DAILY LIVING (ADL)
ADLS_ACUITY_SCORE: 45

## 2025-06-25 VITALS
WEIGHT: 159 LBS | TEMPERATURE: 97.8 F | RESPIRATION RATE: 14 BRPM | HEART RATE: 67 BPM | OXYGEN SATURATION: 100 % | SYSTOLIC BLOOD PRESSURE: 168 MMHG | BODY MASS INDEX: 27.14 KG/M2 | DIASTOLIC BLOOD PRESSURE: 76 MMHG | HEIGHT: 64 IN

## 2025-06-25 LAB
ATRIAL RATE - MUSE: 71 BPM
DIASTOLIC BLOOD PRESSURE - MUSE: NORMAL MMHG
INTERPRETATION ECG - MUSE: NORMAL
P AXIS - MUSE: 50 DEGREES
PR INTERVAL - MUSE: 170 MS
QRS DURATION - MUSE: 78 MS
QT - MUSE: 418 MS
QTC - MUSE: 454 MS
R AXIS - MUSE: -17 DEGREES
SYSTOLIC BLOOD PRESSURE - MUSE: NORMAL MMHG
T AXIS - MUSE: 64 DEGREES
VENTRICULAR RATE- MUSE: 71 BPM

## 2025-06-25 PROCEDURE — 250N000013 HC RX MED GY IP 250 OP 250 PS 637: Performed by: EMERGENCY MEDICINE

## 2025-06-25 RX ORDER — HYDROXYZINE HYDROCHLORIDE 25 MG/1
25 TABLET, FILM COATED ORAL 3 TIMES DAILY PRN
Qty: 30 TABLET | Refills: 0 | Status: SHIPPED | OUTPATIENT
Start: 2025-06-25

## 2025-06-25 RX ORDER — HYDROXYZINE HYDROCHLORIDE 25 MG/1
25 TABLET, FILM COATED ORAL ONCE
Status: COMPLETED | OUTPATIENT
Start: 2025-06-25 | End: 2025-06-25

## 2025-06-25 RX ADMIN — HYDROXYZINE HYDROCHLORIDE 25 MG: 25 TABLET, FILM COATED ORAL at 00:34

## 2025-06-25 NOTE — ED TRIAGE NOTES
"Pt reports two days of \"directional confusion\" reporting having to use GPS even to drive through own neighborhood. Pt also reports checking BP at home tonight and it was in the 190s. Pt denies acute vision changes or HA. Pt reports recent stress d/t daughter's medical issues. Pt takes med for BP but is unsure if she took it today.      Triage Assessment (Adult)       Row Name 06/24/25 2053          Triage Assessment    Airway WDL WDL        Respiratory WDL    Respiratory WDL WDL        Cardiac WDL    Cardiac WDL WDL        Peripheral/Neurovascular WDL    Peripheral Neurovascular WDL WDL        Cognitive/Neuro/Behavioral WDL    Cognitive/Neuro/Behavioral WDL WDL                     "

## 2025-06-25 NOTE — ED PROVIDER NOTES
"  Emergency Department Note      History of Present Illness   Chief Complaint   Hypertension      HPI   Inés Griggs is a 75 year old female with a history of hypertension, hyperlipidemia, and AFIB  who presents to the ED with her son for evaluation of hypertension. She reports couple weeks of \"directional confusion\" and would have to use her GPS despite knowing the places she is going.  She has been having difficulty remembering how to get around and navigate.  He states being more forgetful and isn't sure if she took her blood pressure medication today. Her son reports these symptoms were new to him and doesn't notice any other changes or problems with her cognition or thinking. Patient has been dealing with significant family stressors recently. She mentions taking Zepbound for the past month. Denies headaches, chest pain, shortness of breath, nausea, vomiting, abdominal pain, numbness, weakness, urinary symptoms, or fever. No acute vision changes.  Patient stopped taking Eliquis and is now taking Aspirin at the direction of her doctor.  She denies any other symptoms.      Independent Historian   Son as detailed above.    Review of External Notes   None    Past Medical History   Medical History and Problem List   Anxiety  Arthritis  Hyperlipidemia  Hypertension  Impaired fasting glucose  Osteopenia  AFIB   Dyslipidemia   BRANDON    Medications   Eliquis   Norvasc   Aspirin 81 mg  Coreg  Benadryl  Zetia  Hyzaar  Crestor  Zoloft   Prilosec   Zepbound    Surgical History   Right hip anterior arthroplasty  Left knee arthroplasty  Parotid ectomy - right    Physical Exam     Patient Vitals for the past 24 hrs:   BP Temp Temp src Pulse Resp SpO2 Height Weight   25 0035 -- -- -- 67 14 100 % -- --   25 0034 (!) 168/76 -- -- 65 -- -- -- --   25 2256 (!) 159/79 -- -- -- -- 97 % -- --   25 2225 (!) 151/77 -- -- 64 12 97 % -- --   25 2208 (!) 154/72 -- -- 66 16 99 % -- --   25 " "2155 (!) 154/72 -- -- 67 -- 100 % -- --   06/24/25 2132 (!) 174/81 -- -- -- -- -- -- --   06/24/25 2130 (!) 174/81 -- -- -- -- -- -- --   06/24/25 2054 (!) 220/100 -- -- -- -- -- -- --   06/24/25 2052 -- 97.8  F (36.6  C) Oral 77 16 99 % 1.626 m (5' 4\") 72.1 kg (159 lb)     Physical Exam  General: Well appearing, nontoxic. Resting comfortably  Head:  Scalp, face, and head appear normal  Eyes:  Pupils are equal, round, reactive to light EOMI, no nystagmus     Conjunctivae non-injected and sclerae white  ENT:    The external nose is normal    Pinnae are normal. The oropharynx is normal, mucous membranes moist. Uvula is in the midline. Posterior pharynx without erythema, swelling, exudates.    Neck:  Normal range of motion    There is no rigidity noted    Trachea is in the midline  CV:  Regular rate and rhythm     Normal S1/S2, no S3/S4    No murmur or rub. Radial pulses 2+ bilaterally.  Resp:  Lungs are clear and equal bilaterally  There is no tachypnea    No increased work of breathing    No rales, wheezing, or rhonchi  GI:  Abdomen is soft, no rigidity or guarding    No distension, or mass    No tenderness or rebound tenderness   MS:  Normal muscular tone    Symmetric motor strength    No lower extremity edema  Skin:  No rash or acute skin lesions noted  Neuro:  A&Ox3, GCS 15    CN II - XII intact    Speech clear, fluent, and normal    Strength 5/5 and symmetric in bilateral upper and lower extremities.    No pronator drift. No leg drift. SILT throughout.    No ankle clonus    FTN testing normal. No tremor.     No meningismus   Psych: Normal affect. Appropriate interactions.      Diagnostics   Lab Results   Labs Ordered and Resulted from Time of ED Arrival to Time of ED Departure   BASIC METABOLIC PANEL - Abnormal       Result Value    Sodium 138      Potassium 4.2      Chloride 100      Carbon Dioxide (CO2) 21 (*)     Anion Gap 17 (*)     Urea Nitrogen 16.6      Creatinine 0.59      GFR Estimate >90      Calcium " 10.1      Glucose 88     CBC WITH PLATELETS AND DIFFERENTIAL - Abnormal    WBC Count 9.6      RBC Count 4.35      Hemoglobin 14.4      Hematocrit 42.3      MCV 97      MCH 33.1 (*)     MCHC 34.0      RDW 11.5      Platelet Count 224      % Neutrophils 66      % Lymphocytes 15      % Monocytes 16      % Eosinophils 3      % Basophils 0      % Immature Granulocytes 0      NRBCs per 100 WBC 0      Absolute Neutrophils 6.4      Absolute Lymphocytes 1.4      Absolute Monocytes 1.5 (*)     Absolute Eosinophils 0.3      Absolute Basophils 0.0      Absolute Immature Granulocytes 0.0      Absolute NRBCs 0.0     TSH WITH FREE T4 REFLEX - Abnormal    TSH 5.03 (*)    ROUTINE UA WITH MICROSCOPIC REFLEX TO CULTURE - Abnormal    Color Urine Light Yellow      Appearance Urine Clear      Glucose Urine Negative      Bilirubin Urine Negative      Ketones Urine Negative      Specific Gravity Urine 1.016      Blood Urine Negative      pH Urine 5.5      Protein Albumin Urine Negative      Urobilinogen Urine Normal      Nitrite Urine Negative      Leukocyte Esterase Urine Trace (*)     Mucus Urine Present (*)     RBC Urine <1      WBC Urine 2      Squamous Epithelials Urine <1     HEPATIC FUNCTION PANEL - Normal    Protein Total 8.2      Albumin 4.7      Bilirubin Total 0.3      Alkaline Phosphatase 105      AST 36      ALT 25      Bilirubin Direct <0.08     T4 FREE - Normal    Free T4 1.09       Imaging   CT Head w/o Contrast   Final Result   IMPRESSION:   1.  No CT evidence for acute intracranial process.   2.  Mild chronic microvascular ischemic changes as above.           EKG   ECG taken at 2059, ECG read at 2102  Normal sinus rhythm   Rate 71 bpm. WV interval 170 ms. QRS duration 78 ms. QT/QTc 418/454 ms. P-R-T axes 50 -17 64.     Independent Interpretation   See below     ED Course    Medications Administered   Medications   sodium chloride 0.9% BOLUS 1,000 mL (0 mLs Intravenous Stopped 6/25/25 0035)   amLODIPine (NORVASC) tablet 5  mg (5 mg Oral $Given 6/24/25 2208)   labetalol (NORMODYNE/TRANDATE) injection 15 mg (15 mg Intravenous Not Given 6/25/25 0031)   hydrOXYzine HCl (ATARAX) tablet 25 mg (25 mg Oral $Given 6/25/25 0034)       Procedures   Procedures     Discussion of Management   None    ED Course   ED Course as of 06/25/25 1346   Tue Jun 24, 2025   2149 I obtained history and examined the patient as noted above.    2251 I performed an independent interpretation of the patient's Head CT. No large volume intracranial hemorrhage or midline shift seen.    Wed Jun 25, 2025   0020 Patient rechecked and updated.     Additional Documentation  None  Medical Decision Making / Diagnosis   CMS Diagnoses: None    MIPS       None    MDM   Inés Griggs is a 75 year old female who presents for evaluation of 3 weeks of memory difficulties and difficulty navigating forgetting where she is going and how to get there.  No other neurologic symptoms.  No neurological deficits on examination.  She does not appear to be encephalopathic.  She notes significant psychosocial stressors which may be playing a role.  No other significant clinical symptoms.  No clinical symptoms to suggest acute infectious process.  ED evaluation is thankfully reassuring.  CT head negative for any acute intracranial findings.  No evidence to suggest stroke, intracranial hemorrhage, mass lesion, cerebral edema or any other abnormality.  No fever or leukocytosis.  No other clinical symptoms of infection.  Urinalysis negative for UTI.  Renal function and liver function are normal.  She appeared mildly dehydrated and has a minimal anion gap acidosis likely related to dehydration.  No anemia.  TSH is mildly elevated with normal free T4.  She initially presented significantly hypertensive and reports that she has forgotten to take her blood pressure medications recently.  She was given her amlodipine and blood pressure improved.  I stressed the importance of taking her blood  pressure medications as prescribed and setting up a system to ensure that she does not forget to take them.  She was treated with IV fluids and requested medication for treatment of anxiety, requesting Ativan prescription.  I discussed with her that Ativan or other benzodiazepines would not be indicated as these are amnestic drugs and could make her forgetfulness and memory problems worse.  She can trial hydroxyzine and should discuss with her primary care physician other possible medication options for treatment of anxiety and stress.  At this time there is no evidence for any emergent acute life-threatening pathology.  Patient is stable for discharge with close outpatient follow-up with her primary care physician and neurology.  I encouraged good oral fluid intake, good sleep and minimizing stress is much as possible.  Gradual onset of memory difficulties could be an early sign of dementia and she will need close outpatient follow-up.  The patient and her son are agreeable with the plan of care.  Close return precautions provided and she was discharged in stable condition.    Disposition   The patient was discharged.     Diagnosis     ICD-10-CM    1. Memory difficulty  R41.3       2. Psychosocial stressors  Z65.8            Discharge Medications   Discharge Medication List as of 6/25/2025 12:38 AM        START taking these medications    Details   hydrOXYzine HCl (ATARAX) 25 MG tablet Take 1 tablet (25 mg) by mouth 3 times daily as needed for anxiety., Disp-30 tablet, R-0, E-Prescribe           Scribe Disclosure:  I, Ivy Kearney, am serving as a scribe at 9:54 PM on 6/24/2025 to document services personally performed by Marv Mosqueda MD based on my observations and the provider's statements to me.      Marv Mosqueda MD  06/25/25 [id number]

## 2025-06-26 ENCOUNTER — TELEPHONE (OUTPATIENT)
Dept: OTHER | Facility: CLINIC | Age: 75
End: 2025-06-26
Payer: COMMERCIAL

## 2025-06-26 NOTE — TELEPHONE ENCOUNTER
[facility]    Who is the name of the provider?:  NONE   What is the location you see this provider at/preferred location?: [place]  Person calling / Facility: Inés Griggs  Phone number:  [phone] (home)   Nurse call back needed:  Scheduling      Reason for call:  Patient called to schedule an US ordered by Pilar Pop MD. Patient also stated Dr. Pop sent a referral for her to see Dr. Franco. Please advise how to proceed with ultrasounds and which provider this patient should be seen with.     6/26/2025, 12:40 PM

## 2025-06-26 NOTE — TELEPHONE ENCOUNTER
MA please contact pt primary care provider from [facility] for orders for vascular referral.     Please tell them to fax over referring provider's notes and recommendations.     Thank you    Luz Marina Manley RN  [facility]  Office: [phone]  Fax: [phone]

## 2025-06-30 ENCOUNTER — HOSPITAL ENCOUNTER (OUTPATIENT)
Dept: MAMMOGRAPHY | Facility: CLINIC | Age: 75
Discharge: HOME | End: 2025-06-30
Attending: FAMILY MEDICINE | Admitting: FAMILY MEDICINE
Payer: COMMERCIAL

## 2025-06-30 ENCOUNTER — TRANSFERRED RECORDS (OUTPATIENT)
Dept: HEALTH INFORMATION MANAGEMENT | Facility: CLINIC | Age: 75
End: 2025-06-30

## 2025-06-30 DIAGNOSIS — Z12.31 SCREENING MAMMOGRAM FOR BREAST CANCER: ICD-10-CM

## 2025-06-30 PROCEDURE — 77063 BREAST TOMOSYNTHESIS BI: CPT

## 2025-07-01 ENCOUNTER — TELEPHONE (OUTPATIENT)
Dept: OTHER | Facility: CLINIC | Age: 75
End: 2025-07-01
Payer: COMMERCIAL

## 2025-07-01 NOTE — TELEPHONE ENCOUNTER
Duplicate Referral from [facility]. Faxed notes to HIM. Will be seeing Dr. Hyde for new consult.     Appointments in Next Year      Jul 29, 2025 3:00 PM  (Arrive by 2:45 PM)  New Vascular Patient with Delmis Hyde MD  [facility] [place] ([facility] ) [phone]     Luz Marina Manley RN  [facility]  [facility]ascular [facility]  Office: [phone]  Fax: [phone]

## 2025-07-02 NOTE — CONFIDENTIAL NOTE
NEUROLOGY - PRE VISIT PLANNING             Referring Provider Reason for Referral Office Visit Notes   N/A Memory difficulty  6/24/2025 - 6/25/2025 - ED        IMAGING/NOTES/SCANS Status/ Location  DATE   MRI/MRA(HEAD, NECK, SPINE) N/A     CT/CTA   Internal  6/24/2025    EMG N/A    EEG N/A    LABS Internal    Neuropsychological testing  N/A    Additional Testing/Notes N/A      Does patient have C2C?  Year last updated Action     YES   []      Please update at appointment if outdated more than 5 years       NO     [x]   N/A   Please complete C2C at appointment

## 2025-07-08 ENCOUNTER — TELEPHONE (OUTPATIENT)
Dept: VASCULAR SURGERY | Facility: CLINIC | Age: 75
End: 2025-07-08
Payer: COMMERCIAL

## 2025-07-08 NOTE — TELEPHONE ENCOUNTER
Online Appointment Request     [facility]  Phone Message      Reason for Call: Appointment Intake     Patients Requests:  Appointment Preferences  Reason for appointment  50 to 70% Carotid narrowing found on ultrasound  Preferred Provider  DAKOTA MAGANA  Preferred Location  [place]  Preferred Visit Type  In-person        Action taken: Other: none; Documenting patient was called; detail message was left for patient to call back.  Provided patient with the [place] phone number as that is where  practices.

## 2025-07-10 ENCOUNTER — PRE VISIT (OUTPATIENT)
Dept: NEUROLOGY | Facility: CLINIC | Age: 75
End: 2025-07-10

## 2025-07-10 ENCOUNTER — OFFICE VISIT (OUTPATIENT)
Dept: NEUROLOGY | Facility: CLINIC | Age: 75
End: 2025-07-10
Payer: COMMERCIAL

## 2025-07-10 VITALS — OXYGEN SATURATION: 98 % | DIASTOLIC BLOOD PRESSURE: 65 MMHG | SYSTOLIC BLOOD PRESSURE: 106 MMHG | HEART RATE: 61 BPM

## 2025-07-10 DIAGNOSIS — R41.0 CONFUSION: Primary | ICD-10-CM

## 2025-07-10 RX ORDER — OMEPRAZOLE 20 MG/1
20 CAPSULE, DELAYED RELEASE ORAL DAILY
COMMUNITY
Start: 2025-04-15

## 2025-07-10 NOTE — PROGRESS NOTES
Neurology Consultation    Patient Name:  Inés Griggs  MRN:  [id number]    :  1950  Date of Service:  July 10, 2025  Primary care provider:  Pilar Pop        Chief Complaint: Memory/confusion     History of Present Illness:     Inés Griggs is a 75 year old female who presents for evaluation of memory.  She specifically reports that she is having difficulty with directions in the last month rather acutely.       She reports that about 1 month ago her daughter who is 54 years-old and had a stroke. She drove to and from [place] to be with her. Her son is bipolar.  This has also been very stressful.      Went to get carotid ultrasound as ordered by her PCP.  She forgot her credit card and could not get out of the parking lot.  She lives 1/2 mile away and so decided to walk home. She got very flustered and was almost crying. She picked a direction and it was the right one. Got home and was able to go back to get her car. By the time she was able to get home her BP was very high. Went to the ED. She was given a hydroxyzine RX which has helped.       She reports that in the last month she has needed to use GPS even in familiar places.  She recognizes where she is at but has to think about it intently on where she needs to go and how to get there. She reports and her friend agrees that she has always been great with directions.      She is forgetting things more easily.  She will forget when she is going to meet someone or her appointments. Will need to rely on her calendar more.      She thought her vision was blurred. Saw eye doctor and they told her vision were still fine.  This has resolved.      Activities of Daily Living  - Dressed/pick out own clothes: Denies   - Basic hygiene/bathing: Denies   - Chores/housework: Denies   - Cooking/Meal prep: Denies including following or remembering recipes   - Apts/Medications: Needing some help with medication and appointments and using her calendar.   -  "Lost: See above   - Driving/MVA: Denies   - Finances: Denies      Associated Symptoms  - Tremor: Denies   - Gait changes/falls: Does feel a little off balance. Denies falls  - Hallucinations: Denies   - Sleep/RBSD: Thinks her sleep is fine but has not worn her sleep band recently to track.  Has sleep apnea, not using her CPAP.  Does endorse \"weird dreams\". Denies dream dream re-enactment although does not have a bed partner. Denies falling out of bed.    - Mood changes: +Stress and anxiety recently. Started on hydroxyzine and sertraline was increased 50 mg -->100 mg.     - Hearing: Denies   - Family History: Thinks her father passed away at the age of 75 from a stroke, some mild memory issues prior to this  - Hx of stroke/vascular risk factors: Hypertension, hyperlipidemia, paroxsymal atrial fibrillation not on anticoagulation, obstructive sleep apnea,       Review of Records   - Went to the ED on 6/24/2025 for acute confusion and high blood pressure (presenting /100). Workup largely unremarkable including CT head did not show an acute finding.   - TSH 5.03 (elevated), fT4 1.09 (normal)  - Carotid US: 1.  Moderate plaque formation, velocities consistent with 50-69% stenosis in the right internal carotid artery.  2.  Moderate plaque formation, velocities consistent with borderline values between less than 50% and 50-69% stenosis in the left internal carotid artery.  3.  Flow within the vertebral arteries is antegrade.    Past Medical History:  - Hypertension, hyperlipidemia, paroxsymal atrial fibrillation not on anticoagulation, obstructive sleep apnea, osteopenia    Social History:  - Lives alone with her dog (has a 1 year old lab). 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  Smoked for 15 years, over 45 years ago.  Drinks a couple of glasses of wine on the weekend.  Denies recreational drug use.      Physical Exam:  BP " 106/65   Pulse 61   SpO2 98%     General:  No acute distress  Cardiovascular: Normal rate.  Lung: Respirations are non-labored.  Extremities: Normal range of motion  Integumentary: Warm and dry    Neurologic:  Mental status : alert, oriented, fund of knowledge appropriate for visit  MOCA 29/30  1 pt lost for delayed word recall     LANGUAGE: Some occasional word finding difficulty, no dysarthria     CN:  II- pupils equal and reactive, visual fields full  III, IV, VI- extraocular movements intact  V- sensation intact bilaterally  VII- face symmetric  VIII- hearing intact, no nystagmus  IX, X- palate elevates symmetrically  XI- sternocleidomastoid 5/5  XII- tongue midline    MOTOR : intact bulk. No cogwheel rigidity   5/5 strength in all ext    SENSORY : intact to temperature and vibration in BUE and BLE     REFLEXES:       Right Left   Triceps 2+ 2+   Biceps 2+ 2+   Brachioradialis 2+ 2+   Knee jerk 2+ 2+   Ankle jerk 2 2   Walters absent   Toes down going     MOVEMENT/COORDINATION: finger to nose without significant ataxia, dysmetria, or tremor. Finger taps and rapid alternating movements appropriate speed and amplitude.  Heel to shin without ataxia.      GAIT : Appropriate stride length and speed, difficulty with tandem gait but able to complete     Psychiatric: Cooperative, Appropriate mood & affect.     Assessment/Plan:   Inés Griggs is a 75 year old female who presents for evaluation of memory.  My suspicions at presents are low for a neurodegenerative process. Her onset was rather acute which would be unusual for a dementia.  Her MOCA is very reassuring.  Stress could be a factor. That being said, I would like further evaluation especially as she has several vascular risk factors. Will check a MRI and also some treatable causes. Her TSH was a little elevated but free T4 was normal.  Recommended using her CPAP machine. Will follow-up in 3 months to see how she is doing clinically.     Plan  > MRI brain  W/WO  > Check B1, B12, folate   > Follow-up in 3 months    I spent 77 minutes providing care for this patient on the date of service, including reviewing imaging, labs, and notes as well as providing counseling and coordination of care for the above issues.

## 2025-07-10 NOTE — LETTER
7/10/2025      Inés Griggs  [address]  [facility] [id number]      Dear Colleague,    Thank you for referring your patient, Inés Griggs, to the [facility]. Please see a copy of my visit note below.      Neurology Consultation    Patient Name:  Inés Griggs  MRN:  [id number]    :  1950  Date of Service:  July 10, 2025  Primary care provider:  Pilar Pop        Chief Complaint: Memory/confusion     History of Present Illness:     Inés Griggs is a 75 year old female who presents for evaluation of memory.  She specifically reports that she is having difficulty with directions in the last month rather acutely.       She reports that about 1 month ago her daughter who is 54 years-old and had a stroke. She drove to and from [place] to be with her. Her son is bipolar.  This has also been very stressful.      Went to get carotid ultrasound as ordered by her PCP.  She forgot her credit card and could not get out of the parking lot.  She lives 1/2 mile away and so decided to walk home. She got very flustered and was almost crying. She picked a direction and it was the right one. Got home and was able to go back to get her car. By the time she was able to get home her BP was very high. Went to the ED. She was given a hydroxyzine RX which has helped.       She reports that in the last month she has needed to use GPS even in familiar places.  She recognizes where she is at but has to think about it intently on where she needs to go and how to get there. She reports and her friend agrees that she has always been great with directions.      She is forgetting things more easily.  She will forget when she is going to meet someone or her appointments. Will need to rely on her calendar more.      She thought her vision was blurred. Saw eye doctor and they told her vision were still fine.  This has resolved.      Activities of Daily Living  - Dressed/pick out  "own clothes: Denies   - Basic hygiene/bathing: Denies   - Chores/housework: Denies   - Cooking/Meal prep: Denies including following or remembering recipes   - Apts/Medications: Needing some help with medication and appointments and using her calendar.   - Lost: See above   - Driving/MVA: Denies   - Finances: Denies      Associated Symptoms  - Tremor: Denies   - Gait changes/falls: Does feel a little off balance. Denies falls  - Hallucinations: Denies   - Sleep/RBSD: Thinks her sleep is fine but has not worn her sleep band recently to track.  Has sleep apnea, not using her CPAP.  Does endorse \"weird dreams\". Denies dream dream re-enactment although does not have a bed partner. Denies falling out of bed.    - Mood changes: +Stress and anxiety recently. Started on hydroxyzine and sertraline was increased 50 mg -->100 mg.     - Hearing: Denies   - Family History: Thinks her father passed away at the age of 75 from a stroke, some mild memory issues prior to this  - Hx of stroke/vascular risk factors: Hypertension, hyperlipidemia, paroxsymal atrial fibrillation not on anticoagulation, obstructive sleep apnea,       Review of Records   - Went to the ED on 6/24/2025 for acute confusion and high blood pressure (presenting /100). Workup largely unremarkable including CT head did not show an acute finding.   - TSH 5.03 (elevated), fT4 1.09 (normal)  - Carotid US: 1.  Moderate plaque formation, velocities consistent with 50-69% stenosis in the right internal carotid artery.  2.  Moderate plaque formation, velocities consistent with borderline values between less than 50% and 50-69% stenosis in the left internal carotid artery.  3.  Flow within the vertebral arteries is antegrade.    Past Medical History:  - Hypertension, hyperlipidemia, paroxsymal atrial fibrillation not on anticoagulation, obstructive sleep apnea, osteopenia    Social History:  - Lives alone with her dog (has a 1 year old lab). 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  Smoked for 15 years, over 45 years ago.  Drinks a couple of glasses of wine on the weekend.  Denies recreational drug use.      Physical Exam:  /65   Pulse 61   SpO2 98%     General:  No acute distress  Cardiovascular: Normal rate.  Lung: Respirations are non-labored.  Extremities: Normal range of motion  Integumentary: Warm and dry    Neurologic:  Mental status : alert, oriented, fund of knowledge appropriate for visit  MOCA 29/30  1 pt lost for delayed word recall     LANGUAGE: Some occasional word finding difficulty, no dysarthria     CN:  II- pupils equal and reactive, visual fields full  III, IV, VI- extraocular movements intact  V- sensation intact bilaterally  VII- face symmetric  VIII- hearing intact, no nystagmus  IX, X- palate elevates symmetrically  XI- sternocleidomastoid 5/5  XII- tongue midline    MOTOR : intact bulk. No cogwheel rigidity   5/5 strength in all ext    SENSORY : intact to temperature and vibration in BUE and BLE     REFLEXES:       Right Left   Triceps 2+ 2+   Biceps 2+ 2+   Brachioradialis 2+ 2+   Knee jerk 2+ 2+   Ankle jerk 2 2   Walters absent   Toes down going     MOVEMENT/COORDINATION: finger to nose without significant ataxia, dysmetria, or tremor. Finger taps and rapid alternating movements appropriate speed and amplitude.  Heel to shin without ataxia.      GAIT : Appropriate stride length and speed, difficulty with tandem gait but able to complete     Psychiatric: Cooperative, Appropriate mood & affect.     Assessment/Plan:   Inés Griggs is a 75 year old female who presents for evaluation of memory.  My suspicions at presents are low for a neurodegenerative process. Her onset was rather acute which would be unusual for a dementia.  Her MOCA is very reassuring.  Stress could be a factor. That being said, I would like further evaluation especially as she has  several vascular risk factors. Will check a MRI and also some treatable causes. Her TSH was a little elevated but free T4 was normal.  Recommended using her CPAP machine. Will follow-up in 3 months to see how she is doing clinically.     Plan  > MRI brain W/WO  > Check B1, B12, folate   > Follow-up in 3 months    I spent 77 minutes providing care for this patient on the date of service, including reviewing imaging, labs, and notes as well as providing counseling and coordination of care for the above issues.      Again, thank you for allowing me to participate in the care of your patient.        Sincerely,        Yumiko Malave, DO    Electronically signed

## 2025-07-21 ENCOUNTER — HOSPITAL ENCOUNTER (OUTPATIENT)
Dept: MRI IMAGING | Facility: CLINIC | Age: 75
Discharge: HOME | End: 2025-07-21
Attending: PSYCHIATRY & NEUROLOGY | Admitting: PSYCHIATRY & NEUROLOGY
Payer: COMMERCIAL

## 2025-07-21 DIAGNOSIS — R41.0 CONFUSION: ICD-10-CM

## 2025-07-21 PROCEDURE — 70553 MRI BRAIN STEM W/O & W/DYE: CPT

## 2025-07-21 PROCEDURE — A9585 GADOBUTROL INJECTION: HCPCS | Performed by: PSYCHIATRY & NEUROLOGY

## 2025-07-21 PROCEDURE — 255N000002 HC RX 255 OP 636: Performed by: PSYCHIATRY & NEUROLOGY

## 2025-07-21 RX ORDER — GADOBUTROL 604.72 MG/ML
7 INJECTION INTRAVENOUS ONCE
Status: COMPLETED | OUTPATIENT
Start: 2025-07-21 | End: 2025-07-21

## 2025-07-21 RX ADMIN — GADOBUTROL 7 ML: 604.72 INJECTION INTRAVENOUS at 17:19

## 2025-07-29 ENCOUNTER — OFFICE VISIT (OUTPATIENT)
Dept: OTHER | Facility: CLINIC | Age: 75
End: 2025-07-29
Attending: INTERNAL MEDICINE
Payer: COMMERCIAL

## 2025-07-29 VITALS
OXYGEN SATURATION: 98 % | DIASTOLIC BLOOD PRESSURE: 69 MMHG | SYSTOLIC BLOOD PRESSURE: 111 MMHG | WEIGHT: 158 LBS | HEART RATE: 67 BPM | BODY MASS INDEX: 27.12 KG/M2

## 2025-07-29 DIAGNOSIS — E78.5 HYPERLIPIDEMIA LDL GOAL <70: ICD-10-CM

## 2025-07-29 DIAGNOSIS — G47.33 OSA (OBSTRUCTIVE SLEEP APNEA): ICD-10-CM

## 2025-07-29 DIAGNOSIS — I65.23 CAROTID STENOSIS, ASYMPTOMATIC, BILATERAL: Primary | ICD-10-CM

## 2025-07-29 DIAGNOSIS — I10 BENIGN ESSENTIAL HYPERTENSION: ICD-10-CM

## 2025-07-29 PROCEDURE — 99205 OFFICE O/P NEW HI 60 MIN: CPT | Performed by: INTERNAL MEDICINE

## 2025-07-29 PROCEDURE — G2211 COMPLEX E/M VISIT ADD ON: HCPCS | Performed by: INTERNAL MEDICINE

## 2025-07-29 PROCEDURE — G0463 HOSPITAL OUTPT CLINIC VISIT: HCPCS | Performed by: INTERNAL MEDICINE

## 2025-07-29 PROCEDURE — 3074F SYST BP LT 130 MM HG: CPT | Performed by: INTERNAL MEDICINE

## 2025-07-29 PROCEDURE — 3078F DIAST BP <80 MM HG: CPT | Performed by: INTERNAL MEDICINE

## 2025-07-29 NOTE — PROGRESS NOTES
[facility]        Patient is here for a consult to discuss Carotid stenosis. Patient has no symptoms.    Pt is currently taking Aspirin and Statin.    /70 (BP Location: Right arm, Patient Position: Sitting, Cuff Size: Adult Regular)   Pulse 67   Wt 158 lb (71.7 kg)   SpO2 98%   BMI 27.12 kg/m      The provider has been notified that the patient has no concerns.     Questions patient would like addressed today are: N/A.    Refills are needed: N/A    Has homecare services and agency name:  Alma Haas MA

## 2025-07-29 NOTE — PATIENT INSTRUCTIONS
Go for fasting labs as planned     Take aspirin daily with food     See neurology     Will plan for head and neck CTA in 6 months then visit     Continue current medications

## 2025-07-29 NOTE — PROGRESS NOTES
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  No history of a stroke or TIA-like symptoms.  She also underwent cognitive testing which was unremarkable.  She is having difficulty to find her own place and when she is using GPS in her own neighborhood.  She denies any headache.  History of hyperlipidemia taking Zetia and rosuvastatin which were recently adjusted and pending laboratory test in the near future.  She also has history of paroxysmal atrial fibrillation briefly on anticoagulation currently taking aspirin only.  Former smoker quit many years ago.  History of hypertension well-controlled with current medications.  History of obstructive sleep apnea uses CPAP machine.  She is under a lot of stress with her children's issues    She is new to me reviewed available records in the epic and updated chart      PAST MEDICAL HISTORY  Past Medical History:   Diagnosis Date    Anxiety     Arthritis     Carotid stenosis, asymptomatic, bilateral 50-69% US 7/2025     Hyperlipidemia     Hypertension     Impaired fasting glucose     Osteopenia        CURRENT MEDICATIONS  Current Outpatient Medications   Medication Sig Dispense Refill    acetaminophen (TYLENOL) 325 MG tablet Take 3 tablets (975 mg) by mouth 4 times daily as needed for other (mild pain)  100 tablet 0    amLODIPine (NORVASC) 5 MG tablet Take 1 tablet (5 mg) by mouth daily. 90 tablet 3    aspirin 81 MG EC tablet Take 1 tablet (81 mg) by mouth daily.      calcium carbonate (TUMS) 500 MG chewable tablet Take 1 chew tab by mouth 2 times daily as needed for heartburn      cholecalciferol (VITAMIN D3) 125 mcg (5000 units) capsule       Coenzyme Q10 (COQ-10 PO) Take 1 tablet by mouth every other day (Patient taking differently: Take 1 tablet by mouth 2 times daily.)      diphenhydrAMINE (BENADRYL) 25 MG tablet Take 25 mg by mouth nightly as needed for itching or allergies      ezetimibe (ZETIA) 10 MG tablet Take 1 tablet (10 mg) by mouth daily. 90 tablet 3    hydrOXYzine HCl (ATARAX) 25 MG tablet Take 1 tablet (25 mg) by mouth 3 times daily as needed for anxiety. 30 tablet 0    losartan-hydrochlorothiazide (HYZAAR) 100-25 MG tablet Take 1 tablet by mouth every morning      Multiple Vitamins-Minerals (PRESERVISION AREDS 2) CAPS Take by mouth 2 times daily      Omega-3 Fatty Acids (FISH OIL PO) Take 2 capsules by mouth daily      omeprazole (PRILOSEC) 20 MG DR capsule Take 20 mg by mouth daily.      rosuvastatin (CRESTOR) 10 MG tablet Take 1 tablet (10 mg) by mouth daily. 90 tablet 3    sertraline (ZOLOFT) 100 MG tablet Take 100 mg by mouth daily       No current facility-administered medications for this visit.       PAST SURGICAL HISTORY:  Past Surgical History:   Procedure Laterality Date    ARTHROPLASTY HIP ANTERIOR Right 05/27/2020    Procedure: RIGHT TOTAL HIP ARTHROPLASTY DIRECT ANTERIOR APPROACH;  Surgeon: Usama Christianson MD;  Location: [facility] OR    ARTHROPLASTY KNEE Left 07/21/2021    Procedure: left total knee arthroplasty;  Surgeon: Usama Christianson MD;  Location: [facility] OR    [facility] AFF SURGICAL PATHOLOGY      benign    ENT SURGERY Right     parotid ectomy, in 30s    GYN SURGERY      c section    ORTHOPEDIC SURGERY Right 2020    RT       ALLERGIES     Allergies   Allergen Reactions    Bacitracin Unknown        FAMILY HISTORY  Family History   Problem Relation Age of Onset    Hypertension Mother     Aneurysm Mother     Myocardial Infarction Father     Atrial fibrillation Sister     Heart Failure Sister     Sleep Apnea Daughter     Bipolar Disorder Son          SOCIAL HISTORY  Social History     Socioeconomic History    Marital status:      Spouse name: Not on file    Number of children: Not on file    Years of education: Not on file    Highest education level: Not on file   Occupational History    Not on file   Tobacco Use    Smoking status: Former     Current packs/day: 0.00     Average packs/day: 0.8 packs/day for 17.0 years (12.8 ttl pk-yrs)     Types: Cigarettes     Start date:      Quit date:      Years since quittin.6    Smokeless tobacco: Never   Vaping Use    Vaping status: Never Used   Substance and Sexual Activity    Alcohol use: Yes     Alcohol/week: 10.0 standard drinks of alcohol     Types: 10 Glasses of wine per week     Comment: 1-2 glasses wine, weekends    Drug use: Not Currently    Sexual activity: Not on file   Other Topics Concern    Parent/sibling w/ CABG, MI or angioplasty before 65F 55M? Not Asked   Social History Narrative    Not on file     Social Drivers of Health     Financial Resource Strain: Not on file   Food Insecurity: Not on file   Transportation Needs: Not on file   Physical Activity: Not on file   Stress: Not on file   Social Connections: Not on file   Interpersonal Safety: Not on file   Housing Stability: Not on file       ROS:   General: No change in weight, sleep or appetite.  Normal energy.  No fever or chills  Eyes: Negative for vision changes or eye problems  ENT: No problems with ears, nose or throat.  No difficulty swallowing.  Resp: No coughing, wheezing or shortness of breath  CV: No chest pains or palpitations  GI: No nausea, vomiting,  heartburn, abdominal pain, diarrhea, constipation or change in bowel habits  : No urinary frequency or dysuria,  bladder or kidney problems  Musculoskeletal: No significant muscle or joint pains  Neurologic: No headaches, numbness, tingling, weakness, problems with balance or coordination  Psychiatric: No problems with anxiety, depression or mental health  Heme/immune/allergy: No history of bleeding or clotting problems or anemia.  No allergies or immune system problems  Endocrine: No history of thyroid disease, diabetes or other endocrine disorders  Skin: No rashes,worrisome lesions or skin problems  Vascular:  No claudication, lifestyle limiting or otherwise; no ischemic rest pain; no non-healing ulcers. No weakness, No loss of sensation      EXAM:  /69 (BP Location: Left arm, Patient Position: Sitting, Cuff Size: Adult Regular)   Pulse 67   Wt 158 lb (71.7 kg)   SpO2 98%   BMI 27.12 kg/m    In general, the patient is a pleasant female in no apparent distress.    HEENT: NC/AT.  PERRLA.  EOMI.  Sclerae white, not injected.  Nares clear.  Pharynx without erythema or exudate.  Dentition intact.    Neck: No adenopathy.  No thyromegaly. Carotids +2/2 bilaterally without bruits.  No jugular venous distension.   Heart: RRR. Normal S1, S2 splits physiologically. No murmur, rub, click, or gallop. The PMI is in the 5th ICS in the midclavicular line. There is no heave.    Lungs: CTA.  No ronchi, wheezes, rales.  No dullness to percussion.   Abdomen: Soft, nontender, nondistended. No organomegaly. No AAA.  No bruits.   Extremities: No clubbing, cyanosis, or edema.  No wounds.       Labs:  LIPID RESULTS:  Lab Results   Component Value Date    CHOL 166 03/21/2025    HDL 50 03/21/2025    LDL 90 03/21/2025    TRIG 130 03/21/2025    TRIG 99 07/15/2022       LIVER ENZYME RESULTS:  Lab Results   Component Value Date    AST 36 06/24/2025    ALT 25 06/24/2025       CBC RESULTS:  Lab Results   Component Value Date    WBC 9.6 06/24/2025    RBC 4.35 06/24/2025    HGB 14.4 06/24/2025    HGB 11.2 (L) 05/28/2020    HCT 42.3 06/24/2025    MCV  97 06/24/2025    MCH 33.1 (H) 06/24/2025    MCHC 34.0 06/24/2025    RDW 11.5 06/24/2025     06/24/2025       BMP RESULTS:  Lab Results   Component Value Date     06/24/2025     05/28/2020    POTASSIUM 4.2 06/24/2025    POTASSIUM 3.8 07/21/2021    POTASSIUM 3.5 05/28/2020    CHLORIDE 100 06/24/2025    CHLORIDE 103 07/15/2022    CHLORIDE 111 (H) 05/28/2020    CO2 21 (L) 06/24/2025    CO2 23 05/28/2020    ANIONGAP 17 (H) 06/24/2025    ANIONGAP 6 05/28/2020    GLC 88 06/24/2025     (H) 05/28/2020    BUN 16.6 06/24/2025    BUN 15 05/28/2020    CR 0.59 06/24/2025    CR 0.64 05/28/2020    GFRESTIMATED >90 06/24/2025    GFRESTIMATED >90 05/28/2020    GFRESTBLACK >90 05/28/2020    NADJA 10.1 06/24/2025    NADJA 8.3 (L) 05/28/2020          THYROID RESULTS:  Lab Results   Component Value Date    TSH 5.03 (H) 06/24/2025       Procedures:     EXAM: US CAROTID BILATERAL  LOCATION: [facility]  DATE: 6/24/2025     INDICATION:  Confusion, TIA (transient ischemic attack)  COMPARISON: None.  TECHNIQUE: Duplex exam performed utilizing 2D gray-scale imaging, Doppler interrogation with color-flow and spectral waveform analysis. The percent diameter stenosis is determined using Updated Recommendations for Carotid Stenosis Interpretation Criteria   from [employer] [facility].     FINDINGS:     There is moderate mixed atherosclerotic plaque at the right carotid bifurcation and extension in the proximal right internal carotid artery. Right vertebral artery with antegrade blood flow.     There is moderate mixed atherosclerotic plaque in the left carotid bifurcation and proximal internal carotid artery. Antegrade blood flow in the left vertebral artery.     VELOCITY CHART:  CCA   Right: 97/20 cm/s   Left: 87/23 cm/s  ICA   Right: 201/49 cm/s   Left: 160/37 cm/s  ECA   Right: 173/24 cm/s   Left: 178/17 cm/s  ICA/CCA PSV Ratio   Right: 2.1   Left: 1.8                                                                       IMPRESSION:  1.  Moderate plaque formation, velocities consistent with 50-69% stenosis in the right internal carotid artery.  2.  Moderate plaque formation, velocities consistent with borderline values between less than 50% and 50-69% stenosis in the left internal carotid artery.  3.  Flow within the vertebral arteries is antegrade.     Assessment and Plan:     1. Carotid stenosis, asymptomatic, bilateral (Primary)    2. Benign essential hypertension    3. Hyperlipidemia LDL goal <70    4. 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  Reviewed carotid ultrasound she does have a moderate plaque formation bilaterally with 50 to 69% stenosis  Flow within the vertebral arteries are antegrade.  Risk factors are former smoker quit many years ago, postmenopausal age 75 years old, hypertension well-controlled with amlodipine, hyperlipidemia recently adjusted medications taking both Zetia and rosuvastatin pending fasting labs in the future, history of paroxysmal atrial fibrillation currently not on any anticoagulation took for a while and closely followed by cardiology service.   Reviewed all imaging studies and I had a lengthy discussion with the patient    At present to my recommendations,     Plan for head and neck CTA in 6 months then followed by office visit  Aggressively control risk factors   continue aspirin daily and take with food  Follow-up with neurology  Goal of blood pressure less than 130/80  Go for fasting lipids and goal of LDL less than 70  Follow-up with the neurology, cardiology  service as scheduled       60 minutes spent on the date of the encounter doing chart review, history and exam, documentation, and further activities as noted above.    The longitudinal care of plan for the above diagnoses was addressed during this visit. Due to added complexity of care, we will continue to supprt Inés Griggs and the subsequent management of this/these conditions and with ongoing continuity of care for this/these conditions.     Thank you for the consultation  This note was dictated by utilizing Dragon software  Copy of this note to primary care physician    Delmis Hyde MD,FAHA,FSVM,FNLA, FACP  Vascular Medicine  Clinical Hypertension Specialist   Clinical Lipidologist

## 2025-08-15 PROBLEM — G47.34 NOCTURNAL HYPOXEMIA: Status: ACTIVE | Noted: 2025-08-15

## 2025-08-18 ENCOUNTER — PATIENT OUTREACH (OUTPATIENT)
Dept: CARE COORDINATION | Facility: CLINIC | Age: 75
End: 2025-08-18
Payer: COMMERCIAL

## (undated) DEVICE — DRAPE STERI U 1015

## (undated) DEVICE — SU STRATAFIX PDS PLUS 0 CT-1 30CM SXPP1B450

## (undated) DEVICE — ESU BIPOLAR SEALER AQUAMANTYS 6MM 23-112-1

## (undated) DEVICE — Device

## (undated) DEVICE — GLOVE PROTEXIS W/NEU-THERA 8.5  2D73TE85

## (undated) DEVICE — GLOVE PROTEXIS W/NEU-THERA 8.0  2D73TE80

## (undated) DEVICE — SU ETHIBOND 2 V-37 4X30" MX69G

## (undated) DEVICE — HOOD FLYTE W/PEELAWAY 408-800-100

## (undated) DEVICE — BLADE SAW SAGITTAL STRK 18X90X1.27MM HD SYS 6 6118-127-090

## (undated) DEVICE — GLOVE PROTEXIS POWDER FREE 8.5 ORTHOPEDIC 2D73ET85

## (undated) DEVICE — DRAPE IOBAN INCISE 23X17" 6650EZ

## (undated) DEVICE — PACK TOTAL HIP W/U DRAPE SOP15HUFSC

## (undated) DEVICE — PREP CHLORAPREP 26ML TINTED ORANGE  260815

## (undated) DEVICE — ESU GROUND PAD UNIVERSAL W/O CORD

## (undated) DEVICE — SOL NACL 0.9% IRRIG 1000ML BOTTLE 2F7124

## (undated) DEVICE — MANIFOLD NEPTUNE 4 PORT 700-20

## (undated) DEVICE — BONE CLEANING TIP INTERPULSE  0210-010-000

## (undated) DEVICE — GLOVE PROTEXIS POWDER FREE 7.5 ORTHOPEDIC 2D73ET75

## (undated) DEVICE — SOL WATER IRRIG 1000ML BOTTLE 2F7114

## (undated) DEVICE — DRAPE IOBAN ISOLATION VERTICAL 320X21CM 6617

## (undated) DEVICE — SOL NACL 0.9% INJ 250ML BAG 2B1322Q

## (undated) DEVICE — SOLUTION WOUND CLEANSING 3/4OZ 10% PVP EA-L3011FB-50

## (undated) DEVICE — SYR 50ML LL W/O NDL 309653

## (undated) DEVICE — GLOVE PROTEXIS BLUE W/NEU-THERA 7.0  2D73EB70

## (undated) DEVICE — SU VICRYL 2-0 CP-1 27" UND J266H

## (undated) DEVICE — KIT PATIENT CARE HANA TABLE PROFX SUPINE 6855

## (undated) DEVICE — NDL 21GA 1.5"

## (undated) DEVICE — DRAPE C-ARM 60X42" 1013

## (undated) DEVICE — CLOSURE SYS SKIN PREMIERPRO EXOFIN FUSION 4X22CM STRL 3472

## (undated) DEVICE — DRAPE SHEET REV FOLD 3/4 9349

## (undated) DEVICE — PACK TOTAL KNEE SOP15TKFSD

## (undated) DEVICE — WIPES FOLEY CARE SURESTEP PROVON DFC100

## (undated) DEVICE — SU MONOCRYL 3-0 PS-2 27" Y427H

## (undated) DEVICE — SU STRATAFIX PDS PLUS 2-0 SPIRAL CT-1 30CM SXPP1B410

## (undated) DEVICE — SUCTION IRR SYSTEM W/O TIP INTERPULSE HANDPIECE 0210-100-000

## (undated) DEVICE — DRAPE CONVERTORS U-DRAPE 60X72" 8476

## (undated) DEVICE — DECANTER VIAL 2006S

## (undated) DEVICE — GLOVE PROTEXIS W/NEU-THERA 6.5  2D73TE65

## (undated) DEVICE — BLADE KNIFE SURG 10 371110

## (undated) DEVICE — GLOVE PROTEXIS MICRO 6.5  2D73PM65

## (undated) DEVICE — SU ETHIBOND 0 CTX CR  8X18" CX31D

## (undated) DEVICE — SOL BENZOIN 0.5OZ

## (undated) DEVICE — SU VICRYL 0 CTX CR 8X18" J764D

## (undated) DEVICE — SU STRATAFIX PDS PLUS 1 CT-1 18" SXPP1A404

## (undated) DEVICE — GLOVE PROTEXIS BLUE W/NEU-THERA 8.0  2D73EB80

## (undated) DEVICE — BONE CEMENT MIXEVAC III HI VAC KIT  0206-015-000

## (undated) DEVICE — SOL NACL 0.9% INJ 1000ML BAG 2B1324X

## (undated) DEVICE — SUCTION TIP YANKAUER STR K87

## (undated) DEVICE — WRAP EZY KNEE

## (undated) DEVICE — LINEN TOWEL PACK X5 5464

## (undated) DEVICE — BLADE SAW RECIP STRK 70X12.5X1.2MM 0277-096-281

## (undated) RX ORDER — PROPOFOL 10 MG/ML
INJECTION, EMULSION INTRAVENOUS
Status: DISPENSED
Start: 2021-07-21

## (undated) RX ORDER — ACYCLOVIR 200 MG/1
CAPSULE ORAL
Status: DISPENSED
Start: 2020-05-27

## (undated) RX ORDER — FENTANYL CITRATE 50 UG/ML
INJECTION, SOLUTION INTRAMUSCULAR; INTRAVENOUS
Status: DISPENSED
Start: 2020-05-27

## (undated) RX ORDER — PROPOFOL 10 MG/ML
INJECTION, EMULSION INTRAVENOUS
Status: DISPENSED
Start: 2020-05-27

## (undated) RX ORDER — HYDROMORPHONE HYDROCHLORIDE 1 MG/ML
INJECTION, SOLUTION INTRAMUSCULAR; INTRAVENOUS; SUBCUTANEOUS
Status: DISPENSED
Start: 2020-05-27

## (undated) RX ORDER — DEXAMETHASONE SODIUM PHOSPHATE 4 MG/ML
INJECTION, SOLUTION INTRA-ARTICULAR; INTRALESIONAL; INTRAMUSCULAR; INTRAVENOUS; SOFT TISSUE
Status: DISPENSED
Start: 2021-07-21

## (undated) RX ORDER — ONDANSETRON 2 MG/ML
INJECTION INTRAMUSCULAR; INTRAVENOUS
Status: DISPENSED
Start: 2020-05-27

## (undated) RX ORDER — LIDOCAINE HYDROCHLORIDE 20 MG/ML
INJECTION, SOLUTION EPIDURAL; INFILTRATION; INTRACAUDAL; PERINEURAL
Status: DISPENSED
Start: 2020-05-27

## (undated) RX ORDER — KETOROLAC TROMETHAMINE 30 MG/ML
INJECTION, SOLUTION INTRAMUSCULAR; INTRAVENOUS
Status: DISPENSED
Start: 2020-05-27

## (undated) RX ORDER — TRANEXAMIC ACID 650 MG/1
TABLET ORAL
Status: DISPENSED
Start: 2021-07-21

## (undated) RX ORDER — ONDANSETRON 2 MG/ML
INJECTION INTRAMUSCULAR; INTRAVENOUS
Status: DISPENSED
Start: 2021-07-21

## (undated) RX ORDER — NITROGLYCERIN 0.4 MG/1
TABLET SUBLINGUAL
Status: DISPENSED
Start: 2022-08-09

## (undated) RX ORDER — PREGABALIN 150 MG/1
CAPSULE ORAL
Status: DISPENSED
Start: 2020-05-27

## (undated) RX ORDER — DEXAMETHASONE SODIUM PHOSPHATE 4 MG/ML
INJECTION, SOLUTION INTRA-ARTICULAR; INTRALESIONAL; INTRAMUSCULAR; INTRAVENOUS; SOFT TISSUE
Status: DISPENSED
Start: 2020-05-27

## (undated) RX ORDER — ACETAMINOPHEN 500 MG
TABLET ORAL
Status: DISPENSED
Start: 2020-05-27

## (undated) RX ORDER — ACETAMINOPHEN 325 MG/1
TABLET ORAL
Status: DISPENSED
Start: 2020-05-27

## (undated) RX ORDER — CELECOXIB 200 MG/1
CAPSULE ORAL
Status: DISPENSED
Start: 2020-05-27

## (undated) RX ORDER — FENTANYL CITRATE 50 UG/ML
INJECTION, SOLUTION INTRAMUSCULAR; INTRAVENOUS
Status: DISPENSED
Start: 2021-07-21

## (undated) RX ORDER — CELECOXIB 200 MG/1
CAPSULE ORAL
Status: DISPENSED
Start: 2021-07-21

## (undated) RX ORDER — PREGABALIN 150 MG/1
CAPSULE ORAL
Status: DISPENSED
Start: 2021-07-21

## (undated) RX ORDER — CEFAZOLIN SODIUM 2 G/100ML
INJECTION, SOLUTION INTRAVENOUS
Status: DISPENSED
Start: 2020-05-27

## (undated) RX ORDER — LIDOCAINE HYDROCHLORIDE 20 MG/ML
INJECTION, SOLUTION EPIDURAL; INFILTRATION; INTRACAUDAL; PERINEURAL
Status: DISPENSED
Start: 2021-07-21

## (undated) RX ORDER — CEFAZOLIN SODIUM 1 G/3ML
INJECTION, POWDER, FOR SOLUTION INTRAMUSCULAR; INTRAVENOUS
Status: DISPENSED
Start: 2020-05-27

## (undated) RX ORDER — CEFAZOLIN SODIUM 2 G/100ML
INJECTION, SOLUTION INTRAVENOUS
Status: DISPENSED
Start: 2021-07-21

## (undated) RX ORDER — METOPROLOL TARTRATE 50 MG
TABLET ORAL
Status: DISPENSED
Start: 2022-08-09